# Patient Record
Sex: FEMALE | Race: BLACK OR AFRICAN AMERICAN | NOT HISPANIC OR LATINO | URBAN - METROPOLITAN AREA
[De-identification: names, ages, dates, MRNs, and addresses within clinical notes are randomized per-mention and may not be internally consistent; named-entity substitution may affect disease eponyms.]

---

## 2017-03-08 ENCOUNTER — EMERGENCY (EMERGENCY)
Facility: HOSPITAL | Age: 82
LOS: 0 days | Discharge: HOME | End: 2017-03-08

## 2017-03-09 ENCOUNTER — APPOINTMENT (OUTPATIENT)
Dept: OTOLARYNGOLOGY | Facility: CLINIC | Age: 82
End: 2017-03-09

## 2017-03-14 ENCOUNTER — APPOINTMENT (OUTPATIENT)
Dept: BREAST CENTER | Facility: CLINIC | Age: 82
End: 2017-03-14

## 2017-03-20 ENCOUNTER — APPOINTMENT (OUTPATIENT)
Dept: VASCULAR SURGERY | Facility: CLINIC | Age: 82
End: 2017-03-20

## 2017-03-23 ENCOUNTER — APPOINTMENT (OUTPATIENT)
Dept: VASCULAR SURGERY | Facility: CLINIC | Age: 82
End: 2017-03-23

## 2017-05-02 ENCOUNTER — OUTPATIENT (OUTPATIENT)
Dept: OUTPATIENT SERVICES | Facility: HOSPITAL | Age: 82
LOS: 1 days | Discharge: HOME | End: 2017-05-02

## 2017-05-15 ENCOUNTER — APPOINTMENT (OUTPATIENT)
Dept: VASCULAR SURGERY | Facility: CLINIC | Age: 82
End: 2017-05-15

## 2017-05-15 VITALS
SYSTOLIC BLOOD PRESSURE: 144 MMHG | DIASTOLIC BLOOD PRESSURE: 80 MMHG | WEIGHT: 159 LBS | BODY MASS INDEX: 28.17 KG/M2 | HEIGHT: 63 IN

## 2017-05-15 DIAGNOSIS — Z78.9 OTHER SPECIFIED HEALTH STATUS: ICD-10-CM

## 2017-05-15 DIAGNOSIS — Z86.79 PERSONAL HISTORY OF OTHER DISEASES OF THE CIRCULATORY SYSTEM: ICD-10-CM

## 2017-05-15 DIAGNOSIS — Z86.2 PERSONAL HISTORY OF DISEASES OF THE BLOOD AND BLOOD-FORMING ORGANS AND CERTAIN DISORDERS INVOLVING THE IMMUNE MECHANISM: ICD-10-CM

## 2017-05-15 DIAGNOSIS — Z87.19 PERSONAL HISTORY OF OTHER DISEASES OF THE DIGESTIVE SYSTEM: ICD-10-CM

## 2017-05-15 DIAGNOSIS — I25.10 ATHEROSCLEROTIC HEART DISEASE OF NATIVE CORONARY ARTERY W/OUT ANGINA PECTORIS: ICD-10-CM

## 2017-05-15 RX ORDER — OMEPRAZOLE 20 MG/1
20 TABLET, DELAYED RELEASE ORAL
Refills: 0 | Status: ACTIVE | COMMUNITY

## 2017-05-15 RX ORDER — RANOLAZINE 500 MG/1
500 TABLET, FILM COATED, EXTENDED RELEASE ORAL
Refills: 0 | Status: ACTIVE | COMMUNITY

## 2017-05-15 RX ORDER — METOPROLOL TARTRATE 50 MG/1
50 TABLET, FILM COATED ORAL
Refills: 0 | Status: ACTIVE | COMMUNITY

## 2017-05-15 RX ORDER — ISOSORBIDE DINITRATE 30 MG/1
30 TABLET ORAL
Refills: 0 | Status: ACTIVE | COMMUNITY

## 2017-05-15 RX ORDER — ASPIRIN 81 MG
81 TABLET, DELAYED RELEASE (ENTERIC COATED) ORAL
Refills: 0 | Status: ACTIVE | COMMUNITY

## 2017-05-15 RX ORDER — ATORVASTATIN CALCIUM 10 MG/1
10 TABLET, FILM COATED ORAL
Refills: 0 | Status: ACTIVE | COMMUNITY

## 2017-05-15 RX ORDER — WARFARIN 5 MG/1
5 TABLET ORAL
Refills: 0 | Status: ACTIVE | COMMUNITY

## 2017-06-28 DIAGNOSIS — Z79.01 LONG TERM (CURRENT) USE OF ANTICOAGULANTS: ICD-10-CM

## 2017-06-28 DIAGNOSIS — I48.91 UNSPECIFIED ATRIAL FIBRILLATION: ICD-10-CM

## 2017-08-14 ENCOUNTER — APPOINTMENT (OUTPATIENT)
Dept: VASCULAR SURGERY | Facility: CLINIC | Age: 82
End: 2017-08-14
Payer: MEDICARE

## 2017-08-14 VITALS
WEIGHT: 162 LBS | BODY MASS INDEX: 28.7 KG/M2 | HEIGHT: 63 IN | SYSTOLIC BLOOD PRESSURE: 142 MMHG | DIASTOLIC BLOOD PRESSURE: 60 MMHG

## 2017-08-14 PROCEDURE — 99213 OFFICE O/P EST LOW 20 MIN: CPT

## 2017-08-14 PROCEDURE — 93970 EXTREMITY STUDY: CPT

## 2017-08-14 RX ORDER — WARFARIN 2.5 MG/1
2.5 TABLET ORAL
Qty: 180 | Refills: 0 | Status: ACTIVE | COMMUNITY
Start: 2017-03-15

## 2017-08-14 RX ORDER — CEPHALEXIN 500 MG/1
500 CAPSULE ORAL
Qty: 15 | Refills: 0 | Status: ACTIVE | COMMUNITY
Start: 2017-03-08

## 2017-08-14 RX ORDER — ISOSORBIDE MONONITRATE 30 MG/1
30 TABLET, EXTENDED RELEASE ORAL
Qty: 90 | Refills: 0 | Status: ACTIVE | COMMUNITY
Start: 2017-02-15

## 2017-08-14 RX ORDER — AMLODIPINE BESYLATE 2.5 MG/1
2.5 TABLET ORAL
Qty: 90 | Refills: 0 | Status: ACTIVE | COMMUNITY
Start: 2017-04-21

## 2017-08-14 RX ORDER — VALSARTAN 160 MG/1
160 TABLET, COATED ORAL
Qty: 90 | Refills: 0 | Status: ACTIVE | COMMUNITY
Start: 2017-02-20

## 2017-08-14 RX ORDER — OMEPRAZOLE 40 MG/1
40 CAPSULE, DELAYED RELEASE ORAL
Qty: 90 | Refills: 0 | Status: ACTIVE | COMMUNITY
Start: 2017-03-11

## 2017-08-14 RX ORDER — HYDROCHLOROTHIAZIDE 12.5 MG/1
12.5 CAPSULE ORAL
Qty: 90 | Refills: 0 | Status: ACTIVE | COMMUNITY
Start: 2017-06-12

## 2018-02-10 ENCOUNTER — EMERGENCY (EMERGENCY)
Facility: HOSPITAL | Age: 83
LOS: 0 days | Discharge: HOME | End: 2018-02-10
Attending: EMERGENCY MEDICINE | Admitting: INTERNAL MEDICINE

## 2018-02-10 VITALS
DIASTOLIC BLOOD PRESSURE: 69 MMHG | RESPIRATION RATE: 18 BRPM | OXYGEN SATURATION: 96 % | TEMPERATURE: 98 F | HEART RATE: 71 BPM | SYSTOLIC BLOOD PRESSURE: 143 MMHG

## 2018-02-10 DIAGNOSIS — Z79.899 OTHER LONG TERM (CURRENT) DRUG THERAPY: ICD-10-CM

## 2018-02-10 DIAGNOSIS — Z98.890 OTHER SPECIFIED POSTPROCEDURAL STATES: Chronic | ICD-10-CM
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
## 2018-02-10 DIAGNOSIS — M79.661 PAIN IN RIGHT LOWER LEG: ICD-10-CM
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
## 2018-02-10 DIAGNOSIS — R42 DIZZINESS AND GIDDINESS: ICD-10-CM

## 2018-02-10 DIAGNOSIS — I10 ESSENTIAL (PRIMARY) HYPERTENSION: ICD-10-CM

## 2018-02-10 DIAGNOSIS — Z79.01 LONG TERM (CURRENT) USE OF ANTICOAGULANTS: ICD-10-CM

## 2018-02-10 DIAGNOSIS — I97.610 POSTPROCEDURAL HEMORRHAGE OF A CIRCULATORY SYSTEM ORGAN OR STRUCTURE FOLLOWING A CARDIAC CATHETERIZATION: ICD-10-CM
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
## 2018-02-10 DIAGNOSIS — Z90.710 ACQUIRED ABSENCE OF BOTH CERVIX AND UTERUS: ICD-10-CM
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
## 2018-02-10 DIAGNOSIS — Z98.890 OTHER SPECIFIED POSTPROCEDURAL STATES: ICD-10-CM

## 2018-02-10 DIAGNOSIS — I25.10 ATHEROSCLEROTIC HEART DISEASE OF NATIVE CORONARY ARTERY WITHOUT ANGINA PECTORIS: ICD-10-CM

## 2018-02-10 LAB
ALBUMIN SERPL ELPH-MCNC: 3 G/DL — SIGNIFICANT CHANGE UP (ref 3–5.5)
ALP SERPL-CCNC: 46 U/L — SIGNIFICANT CHANGE UP (ref 30–115)
ALT FLD-CCNC: 13 U/L — SIGNIFICANT CHANGE UP (ref 0–41)
ANION GAP SERPL CALC-SCNC: 7 MMOL/L — SIGNIFICANT CHANGE UP (ref 7–14)
APTT BLD: 28.4 SEC — SIGNIFICANT CHANGE UP (ref 27–39.2)
AST SERPL-CCNC: 41 U/L — SIGNIFICANT CHANGE UP (ref 0–41)
BASOPHILS # BLD AUTO: 0.02 K/UL — SIGNIFICANT CHANGE UP (ref 0–0.2)
BASOPHILS NFR BLD AUTO: 0.3 % — SIGNIFICANT CHANGE UP (ref 0–1)
BILIRUB SERPL-MCNC: 1.4 MG/DL — HIGH (ref 0.2–1.2)
BUN SERPL-MCNC: 11 MG/DL — SIGNIFICANT CHANGE UP (ref 10–20)
CALCIUM SERPL-MCNC: 8.5 MG/DL — SIGNIFICANT CHANGE UP (ref 8.5–10.1)
CHLORIDE SERPL-SCNC: 101 MMOL/L — SIGNIFICANT CHANGE UP (ref 98–110)
CO2 SERPL-SCNC: 30 MMOL/L — SIGNIFICANT CHANGE UP (ref 17–32)
CREAT SERPL-MCNC: 0.9 MG/DL — SIGNIFICANT CHANGE UP (ref 0.7–1.5)
EOSINOPHIL # BLD AUTO: 0.09 K/UL — SIGNIFICANT CHANGE UP (ref 0–0.7)
EOSINOPHIL NFR BLD AUTO: 1.2 % — SIGNIFICANT CHANGE UP (ref 0–8)
GLUCOSE SERPL-MCNC: 86 MG/DL — SIGNIFICANT CHANGE UP (ref 70–110)
HCT VFR BLD CALC: 32.8 % — LOW (ref 37–47)
HGB BLD-MCNC: 10.7 G/DL — LOW (ref 14–18)
IMM GRANULOCYTES NFR BLD AUTO: 0.4 % — HIGH (ref 0.1–0.3)
INR BLD: 1.35 RATIO — HIGH (ref 0.65–1.3)
LYMPHOCYTES # BLD AUTO: 1.04 K/UL — LOW (ref 1.2–3.4)
LYMPHOCYTES # BLD AUTO: 14.1 % — LOW (ref 20.5–51.1)
MCHC RBC-ENTMCNC: 28.5 PG — SIGNIFICANT CHANGE UP (ref 27–31)
MCHC RBC-ENTMCNC: 32.6 G/DL — SIGNIFICANT CHANGE UP (ref 32–37)
MCV RBC AUTO: 87.5 FL — SIGNIFICANT CHANGE UP (ref 81–91)
MONOCYTES # BLD AUTO: 0.93 K/UL — HIGH (ref 0.1–0.6)
MONOCYTES NFR BLD AUTO: 12.6 % — HIGH (ref 1.7–9.3)
NEUTROPHILS # BLD AUTO: 5.29 K/UL — SIGNIFICANT CHANGE UP (ref 1.4–6.5)
NEUTROPHILS NFR BLD AUTO: 71.4 % — SIGNIFICANT CHANGE UP (ref 42.2–75.2)
NRBC # BLD: 0 /100 WBCS — SIGNIFICANT CHANGE UP (ref 0–0)
PLATELET # BLD AUTO: 278 K/UL — SIGNIFICANT CHANGE UP (ref 130–400)
POTASSIUM SERPL-MCNC: 3.9 MMOL/L — SIGNIFICANT CHANGE UP (ref 3.5–5)
POTASSIUM SERPL-SCNC: 3.9 MMOL/L — SIGNIFICANT CHANGE UP (ref 3.5–5)
PROT SERPL-MCNC: 5.6 G/DL — LOW (ref 6–8)
PROTHROM AB SERPL-ACNC: 14.7 SEC — HIGH (ref 9.95–12.87)
RBC # BLD: 3.75 M/UL — LOW (ref 4.2–5.4)
RBC # FLD: 15.8 % — HIGH (ref 11.5–14.5)
SODIUM SERPL-SCNC: 138 MMOL/L — SIGNIFICANT CHANGE UP (ref 135–146)
WBC # BLD: 7.4 K/UL — SIGNIFICANT CHANGE UP (ref 4.8–10.8)
WBC # FLD AUTO: 7.4 K/UL — SIGNIFICANT CHANGE UP (ref 4.8–10.8)

## 2018-02-10 NOTE — ED PROCEDURE NOTE - PROCEDURE ADDITIONAL DETAILS
initially attempted dermabond but wound still bleeding. surgical site from left groin was cleansed again, and 3 stitches placed with success.

## 2018-02-10 NOTE — ED PROVIDER NOTE - MEDICAL DECISION MAKING DETAILS
elderly female with post op bleeding from surgical wound site on left groin. sutured placed, labs and studies done. pt did well and dc home. baseline hg 8.9 and today over 10.

## 2018-02-10 NOTE — ED PROVIDER NOTE - CARE PLAN
Principal Discharge DX:	Bleeding  Secondary Diagnosis:	Cardiac catheterization as cause of abnormal reaction or later complication  Secondary Diagnosis:	Laceration of leg

## 2018-02-10 NOTE — ED PROVIDER NOTE - PROGRESS NOTE DETAILS
spoke to Esther vascular tech a few hours ago--venous duplex neg and arterial study shows no pseduoaneursym in groin

## 2018-02-10 NOTE — ED PROVIDER NOTE - OBJECTIVE STATEMENT
84 yo female with PMH CAD, HTN, TAVR 3 days ago at [facility] presents to ER for bleeding from surgical wound site in left groin. Pt states this was the side the catheter went thru for TAVR and did well with procedure right after. But bleeding started severely a few hours pta. Pt states she also has some pain to the right groin when she had a diagnostic cath on the right groin. No open wound to right groin but +mild discomfort. Also states some swelling to right knee that feels like her arthritis. Pt has no fever, no chills, no sob, +mild dizziness. No sob, no cp, no N/V/diarrhea. Pt denies any other trauma or complaints. Now on coumadin, plavix and aspirin after this TAVR procedure.     Psurg: left knee replacement  All: NKDA  SH: no ETOH, no tabacco,   PMD Madella, Cardio [place]

## 2018-02-10 NOTE — ED PROVIDER NOTE - PHYSICAL EXAMINATION
VITAL SIGNS: I have reviewed nursing notes and confirm.  CONSTITUTIONAL: Well-developed; well-nourished; in no acute distress.  SKIN: Skin exam is warm and dry, no acute rash. +laceration to the left groin 3 cm, linear from surgical wound site actively bleeding but not pulsatile (more oozing from site)  HEAD: Normocephalic; atraumatic.  EYES: PERRL, EOM intact; conjunctiva and sclera clear.  ENT: No nasal discharge; airway clear. TMs clear.  NECK: Supple; non tender.  CARD: S1, S2 normal; no murmurs, gallops, or rubs. Regular rate and rhythm.  RESP: No wheezes, rales or rhonchi.  ABD: Normal bowel sounds; soft; non-distended; non-tender; no hepatosplenomegaly.  EXT: Normal ROM except mild limitation to right knee secondary to some pain and swelling. No clubbing, cyanosis or edema. Mild right calf pain   LYMPH: No acute cervical adenopathy.  NEURO: Alert, oriented. Grossly unremarkable. No focal deficits.  PSYCH: Cooperative, appropriate.

## 2018-02-11 VITALS
SYSTOLIC BLOOD PRESSURE: 148 MMHG | DIASTOLIC BLOOD PRESSURE: 71 MMHG | HEART RATE: 88 BPM | OXYGEN SATURATION: 97 % | TEMPERATURE: 97 F | RESPIRATION RATE: 18 BRPM

## 2018-02-12 ENCOUNTER — APPOINTMENT (OUTPATIENT)
Dept: VASCULAR SURGERY | Facility: CLINIC | Age: 83
End: 2018-02-12

## 2018-03-05 ENCOUNTER — EMERGENCY (EMERGENCY)
Facility: HOSPITAL | Age: 83
LOS: 0 days | Discharge: HOME | End: 2018-03-05
Attending: EMERGENCY MEDICINE | Admitting: INTERNAL MEDICINE

## 2018-03-05 VITALS
SYSTOLIC BLOOD PRESSURE: 166 MMHG | OXYGEN SATURATION: 99 % | RESPIRATION RATE: 18 BRPM | DIASTOLIC BLOOD PRESSURE: 74 MMHG | HEART RATE: 84 BPM | TEMPERATURE: 97 F

## 2018-03-05 DIAGNOSIS — I25.10 ATHEROSCLEROTIC HEART DISEASE OF NATIVE CORONARY ARTERY WITHOUT ANGINA PECTORIS: ICD-10-CM

## 2018-03-05 DIAGNOSIS — K64.9 UNSPECIFIED HEMORRHOIDS: ICD-10-CM

## 2018-03-05 DIAGNOSIS — Z79.899 OTHER LONG TERM (CURRENT) DRUG THERAPY: ICD-10-CM

## 2018-03-05 DIAGNOSIS — Z98.890 OTHER SPECIFIED POSTPROCEDURAL STATES: Chronic | ICD-10-CM

## 2018-03-05 DIAGNOSIS — Z90.710 ACQUIRED ABSENCE OF BOTH CERVIX AND UTERUS: ICD-10-CM

## 2018-03-05 DIAGNOSIS — Z95.5 PRESENCE OF CORONARY ANGIOPLASTY IMPLANT AND GRAFT: ICD-10-CM

## 2018-03-05 DIAGNOSIS — Z95.2 PRESENCE OF PROSTHETIC HEART VALVE: ICD-10-CM

## 2018-03-05 DIAGNOSIS — I10 ESSENTIAL (PRIMARY) HYPERTENSION: ICD-10-CM

## 2018-03-05 DIAGNOSIS — Z79.01 LONG TERM (CURRENT) USE OF ANTICOAGULANTS: ICD-10-CM

## 2018-03-05 DIAGNOSIS — R79.9 ABNORMAL FINDING OF BLOOD CHEMISTRY, UNSPECIFIED: ICD-10-CM

## 2018-03-05 DIAGNOSIS — D64.9 ANEMIA, UNSPECIFIED: ICD-10-CM

## 2018-03-05 DIAGNOSIS — Z79.02 LONG TERM (CURRENT) USE OF ANTITHROMBOTICS/ANTIPLATELETS: ICD-10-CM

## 2018-03-05 DIAGNOSIS — Z79.82 LONG TERM (CURRENT) USE OF ASPIRIN: ICD-10-CM

## 2018-03-05 LAB
ANION GAP SERPL CALC-SCNC: 7 MMOL/L — SIGNIFICANT CHANGE UP (ref 7–14)
APTT BLD: 36.6 SEC — SIGNIFICANT CHANGE UP (ref 27–39.2)
BASOPHILS # BLD AUTO: 0.02 K/UL — SIGNIFICANT CHANGE UP (ref 0–0.2)
BASOPHILS NFR BLD AUTO: 0.4 % — SIGNIFICANT CHANGE UP (ref 0–1)
BLD GP AB SCN SERPL QL: SIGNIFICANT CHANGE UP
BUN SERPL-MCNC: 11 MG/DL — SIGNIFICANT CHANGE UP (ref 10–20)
CALCIUM SERPL-MCNC: 8.6 MG/DL — SIGNIFICANT CHANGE UP (ref 8.5–10.1)
CHLORIDE SERPL-SCNC: 105 MMOL/L — SIGNIFICANT CHANGE UP (ref 98–110)
CO2 SERPL-SCNC: 28 MMOL/L — SIGNIFICANT CHANGE UP (ref 17–32)
CREAT SERPL-MCNC: 0.9 MG/DL — SIGNIFICANT CHANGE UP (ref 0.7–1.5)
EOSINOPHIL # BLD AUTO: 0.1 K/UL — SIGNIFICANT CHANGE UP (ref 0–0.7)
EOSINOPHIL NFR BLD AUTO: 2.2 % — SIGNIFICANT CHANGE UP (ref 0–8)
GLUCOSE SERPL-MCNC: 99 MG/DL — SIGNIFICANT CHANGE UP (ref 70–110)
HCT VFR BLD CALC: 25.9 % — LOW (ref 37–47)
HGB BLD-MCNC: 8.3 G/DL — LOW (ref 12–16)
IMM GRANULOCYTES NFR BLD AUTO: 0.2 % — SIGNIFICANT CHANGE UP (ref 0.1–0.3)
INR BLD: 2.71 RATIO — HIGH (ref 0.65–1.3)
LYMPHOCYTES # BLD AUTO: 1.22 K/UL — SIGNIFICANT CHANGE UP (ref 1.2–3.4)
LYMPHOCYTES # BLD AUTO: 26.5 % — SIGNIFICANT CHANGE UP (ref 20.5–51.1)
MCHC RBC-ENTMCNC: 29.5 PG — SIGNIFICANT CHANGE UP (ref 27–31)
MCHC RBC-ENTMCNC: 32 G/DL — SIGNIFICANT CHANGE UP (ref 32–37)
MCV RBC AUTO: 92.2 FL — SIGNIFICANT CHANGE UP (ref 81–99)
MONOCYTES # BLD AUTO: 0.47 K/UL — SIGNIFICANT CHANGE UP (ref 0.1–0.6)
MONOCYTES NFR BLD AUTO: 10.2 % — HIGH (ref 1.7–9.3)
NEUTROPHILS # BLD AUTO: 2.79 K/UL — SIGNIFICANT CHANGE UP (ref 1.4–6.5)
NEUTROPHILS NFR BLD AUTO: 60.5 % — SIGNIFICANT CHANGE UP (ref 42.2–75.2)
NRBC # BLD: 0 /100 WBCS — SIGNIFICANT CHANGE UP (ref 0–0)
PLATELET # BLD AUTO: 308 K/UL — SIGNIFICANT CHANGE UP (ref 130–400)
POTASSIUM SERPL-MCNC: 3.6 MMOL/L — SIGNIFICANT CHANGE UP (ref 3.5–5)
POTASSIUM SERPL-SCNC: 3.6 MMOL/L — SIGNIFICANT CHANGE UP (ref 3.5–5)
PROTHROM AB SERPL-ACNC: 29.9 SEC — HIGH (ref 9.95–12.87)
RBC # BLD: 2.81 M/UL — LOW (ref 4.2–5.4)
RBC # FLD: 18.8 % — HIGH (ref 11.5–14.5)
SODIUM SERPL-SCNC: 140 MMOL/L — SIGNIFICANT CHANGE UP (ref 135–146)
TYPE + AB SCN PNL BLD: SIGNIFICANT CHANGE UP
WBC # BLD: 4.61 K/UL — LOW (ref 4.8–10.8)
WBC # FLD AUTO: 4.61 K/UL — LOW (ref 4.8–10.8)

## 2018-03-05 NOTE — ED PROVIDER NOTE - ATTENDING CONTRIBUTION TO CARE
83F PMH CAD stents, AS s/p TAVR 1 mo ago, dvt/pe coumadin, anemia on iron (Hgb 8- 10) sent by PMD for anemia. Routine blood work showed Hgb 7. +fatigue. no cp, sob, palp, dizziness, loc. no BRBPR or melena. No vaginal bleeding. on exam, AFVSS, well shaina nad, ncat, eomi, perrla, mmm, lctab, rrr nl s1s2 no mrg, abd soft ntnd, aaox3, no focal deficits, no le edema or calf ttp, a/p; Anemia, will check cbc, basic labs, t+s, rectal exam, re-eval. 83F PMH CAD stents, AS s/p TAVR 1 mo ago, dvt/pe coumadin, anemia on iron (Hgb 8- 10) sent by PMD for anemia. Routine blood work by pmh showed Hgb 7. +fatigue. no cp, sob, palp, dizziness, loc. no BRBPR or melena. No vaginal bleeding. pt states hgb was 7 prior to tavr and was given 2u 1 week prior to procedure and then 2 u day of procedure. on exam, AFVSS, well shaina nad, ncat, eomi, perrla, mmm, lctab, rrr nl s1s2 no mrg, abd soft ntnd, aaox3, no focal deficits, no le edema or calf ttp, Rectal exam with resident dr torres- brown stool, nontender nonbleeding external hemorhoid, guiac neg stool,  a/p; Anemia, will check cbc, basic labs, t+s, re-eval. rectal exam no blood, guiaic neg.

## 2018-03-05 NOTE — ED PROVIDER NOTE - OBJECTIVE STATEMENT
82 yo F with cad s/p 6 stents [last in 2005] on asa, dvt/pe on coumadin, TAVR [2/2018] started on plavix after procedure, ? lupus [off treatment since 1972] sent in by pmd for low hemoglobin . She went for routine visit friday and today she was told to come to ed for a hg of 7 and to get a transfusion. She endorse fatigue which she always has but denies any dizziness, chest pain. palpitation, hemetemisis, vaginal bleeding, melana or hematochezia. No weight changes. no dyspnea no hematuria.    colono 2013 - diverticulosis, hemorrhoids, single polyp removed    egd 2013 - erosive gastritis 82 yo F with cad s/p 6 stents [last in 2005] on asa, dvt/pe on coumadin, TAVR [2/2018] started on plavix after procedure, ? lupus [off treatment since 1972] sent in by pmd for low hemoglobin . Patient has known history of anemia, prior to her TAVR last month hb was 7 and she receieved prbc preoperatievly. She went for routine visit friday and today she was told to come to ed for a hg of 7 and to get a transfusion. She endorse fatigue which she always has but denies any dizziness, chest pain. palpitation, hemetemisis, vaginal bleeding, melana or hematochezia. No weight changes. no dyspnea no hematuria.    colono 2013 - diverticulosis, hemorrhoids, single polyp removed    egd 2013 - erosive gastritis

## 2018-03-05 NOTE — ED PROVIDER NOTE - PHYSICAL EXAMINATION
GENERAL: NAD, well-groomed, well-developed  EYES: EOMI, PERRLA, conjunctiva and sclera clear  ENMT: No tonsillar erythema, exudates, or enlargement; Moist mucous membranes, Good dentition  NECK: Supple, No JVD  NERVOUS SYSTEM:  Alert & Oriented X3, Motor Strength 5/5 B/L upper and lower extremities  CHEST/LUNG: Clear to percussion bilaterally; No rales, rhonchi, wheezing, or rubs  HEART: Regular rate and rhythm; No murmurs, rubs, or gallops  ABDOMEN: Soft, Nontender, Nondistended; Bowel sounds present  EXTREMITIES:  2+ Peripheral Pulses, No clubbing, cyanosis, or edema  LYMPH: No lymphadenopathy noted  SKIN: No rashes or lesions GENERAL: NAD, well-groomed, well-developed  EYES: EOMI, PERRLA, conjunctiva and sclera clear  ENMT: No tonsillar erythema, exudates, or enlargement; Moist mucous membranes, Good dentition  NECK: Supple, No JVD  NERVOUS SYSTEM:  Alert & Oriented X3, Motor Strength 5/5 B/L upper and lower extremities  CHEST/LUNG: Clear to percussion bilaterally; No rales, rhonchi, wheezing, or rubs  HEART: Regular rate and rhythm; No murmurs, rubs, or gallops  ABDOMEN: Soft, Nontender, Nondistended; Bowel sounds present  SCARLET: + hemorrhoids, no bleeding, negative guaicaic  EXTREMITIES:  2+ Peripheral Pulses, No clubbing, cyanosis, or edema  LYMPH: No lymphadenopathy noted  SKIN: No rashes or lesions

## 2018-03-05 NOTE — ED PROVIDER NOTE - MEDICAL DECISION MAKING DETAILS
Hgb 8.3, asymptomatic, no indication to transfuse, pt given copy of results to f/u w pmd. strict return precautions provided

## 2018-06-12 DIAGNOSIS — Z79.82 LONG TERM (CURRENT) USE OF ASPIRIN: ICD-10-CM

## 2018-06-12 DIAGNOSIS — Z79.01 LONG TERM (CURRENT) USE OF ANTICOAGULANTS: ICD-10-CM

## 2018-06-12 DIAGNOSIS — Z86.711 PERSONAL HISTORY OF PULMONARY EMBOLISM: ICD-10-CM

## 2018-06-12 DIAGNOSIS — I25.10 ATHEROSCLEROTIC HEART DISEASE OF NATIVE CORONARY ARTERY WITHOUT ANGINA PECTORIS: ICD-10-CM

## 2018-06-12 DIAGNOSIS — R04.0 EPISTAXIS: ICD-10-CM

## 2018-06-12 DIAGNOSIS — Z88.2 ALLERGY STATUS TO SULFONAMIDES: ICD-10-CM

## 2018-06-12 DIAGNOSIS — I11.0 HYPERTENSIVE HEART DISEASE WITH HEART FAILURE: ICD-10-CM

## 2018-06-12 DIAGNOSIS — Z86.718 PERSONAL HISTORY OF OTHER VENOUS THROMBOSIS AND EMBOLISM: ICD-10-CM

## 2018-06-12 DIAGNOSIS — E78.00 PURE HYPERCHOLESTEROLEMIA, UNSPECIFIED: ICD-10-CM

## 2018-06-12 DIAGNOSIS — I50.9 HEART FAILURE, UNSPECIFIED: ICD-10-CM

## 2018-06-12 DIAGNOSIS — I48.91 UNSPECIFIED ATRIAL FIBRILLATION: ICD-10-CM

## 2018-06-12 DIAGNOSIS — R23.3 SPONTANEOUS ECCHYMOSES: ICD-10-CM

## 2018-07-12 ENCOUNTER — APPOINTMENT (OUTPATIENT)
Dept: VASCULAR SURGERY | Facility: CLINIC | Age: 83
End: 2018-07-12
Payer: MEDICARE

## 2018-07-12 VITALS
DIASTOLIC BLOOD PRESSURE: 82 MMHG | HEIGHT: 63 IN | SYSTOLIC BLOOD PRESSURE: 150 MMHG | WEIGHT: 165 LBS | BODY MASS INDEX: 29.23 KG/M2

## 2018-07-12 PROCEDURE — 99213 OFFICE O/P EST LOW 20 MIN: CPT

## 2018-07-12 PROCEDURE — 93970 EXTREMITY STUDY: CPT

## 2018-07-17 PROBLEM — L93.0 DISCOID LUPUS ERYTHEMATOSUS: Chronic | Status: INACTIVE | Noted: 2018-02-10 | Resolved: 2018-03-05

## 2018-07-22 ENCOUNTER — INPATIENT (INPATIENT)
Facility: HOSPITAL | Age: 83
LOS: 4 days | Discharge: HOME HEALTH | End: 2018-07-27
Attending: INTERNAL MEDICINE | Admitting: INTERNAL MEDICINE
Payer: MEDICARE

## 2018-07-22 VITALS
OXYGEN SATURATION: 100 % | SYSTOLIC BLOOD PRESSURE: 179 MMHG | RESPIRATION RATE: 18 BRPM | TEMPERATURE: 96 F | HEART RATE: 60 BPM | DIASTOLIC BLOOD PRESSURE: 92 MMHG

## 2018-07-22 DIAGNOSIS — Z86.718 PERSONAL HISTORY OF OTHER VENOUS THROMBOSIS AND EMBOLISM: ICD-10-CM

## 2018-07-22 DIAGNOSIS — Z79.01 LONG TERM (CURRENT) USE OF ANTICOAGULANTS: ICD-10-CM

## 2018-07-22 DIAGNOSIS — R06.02 SHORTNESS OF BREATH: ICD-10-CM

## 2018-07-22 DIAGNOSIS — N17.9 ACUTE KIDNEY FAILURE, UNSPECIFIED: ICD-10-CM

## 2018-07-22 DIAGNOSIS — Z95.2 PRESENCE OF PROSTHETIC HEART VALVE: Chronic | ICD-10-CM

## 2018-07-22 DIAGNOSIS — R00.1 BRADYCARDIA, UNSPECIFIED: ICD-10-CM

## 2018-07-22 DIAGNOSIS — M19.90 UNSPECIFIED OSTEOARTHRITIS, UNSPECIFIED SITE: ICD-10-CM

## 2018-07-22 DIAGNOSIS — Z98.890 OTHER SPECIFIED POSTPROCEDURAL STATES: Chronic | ICD-10-CM

## 2018-07-22 DIAGNOSIS — I48.0 PAROXYSMAL ATRIAL FIBRILLATION: ICD-10-CM

## 2018-07-22 DIAGNOSIS — I25.10 ATHEROSCLEROTIC HEART DISEASE OF NATIVE CORONARY ARTERY WITHOUT ANGINA PECTORIS: ICD-10-CM

## 2018-07-22 DIAGNOSIS — I10 ESSENTIAL (PRIMARY) HYPERTENSION: ICD-10-CM

## 2018-07-22 DIAGNOSIS — R42 DIZZINESS AND GIDDINESS: ICD-10-CM

## 2018-07-22 DIAGNOSIS — Z95.3 PRESENCE OF XENOGENIC HEART VALVE: ICD-10-CM

## 2018-07-22 DIAGNOSIS — D64.9 ANEMIA, UNSPECIFIED: ICD-10-CM

## 2018-07-22 DIAGNOSIS — Z95.5 PRESENCE OF CORONARY ANGIOPLASTY IMPLANT AND GRAFT: ICD-10-CM

## 2018-07-22 DIAGNOSIS — E78.5 HYPERLIPIDEMIA, UNSPECIFIED: ICD-10-CM

## 2018-07-22 DIAGNOSIS — R00.0 TACHYCARDIA, UNSPECIFIED: ICD-10-CM

## 2018-07-22 LAB
ALBUMIN SERPL ELPH-MCNC: 4 G/DL — SIGNIFICANT CHANGE UP (ref 3.5–5.2)
ALP SERPL-CCNC: 72 U/L — SIGNIFICANT CHANGE UP (ref 30–115)
ALT FLD-CCNC: 18 U/L — SIGNIFICANT CHANGE UP (ref 0–41)
ANION GAP SERPL CALC-SCNC: 15 MMOL/L — HIGH (ref 7–14)
APPEARANCE UR: CLEAR — SIGNIFICANT CHANGE UP
APTT BLD: 33.8 SEC — SIGNIFICANT CHANGE UP (ref 27–39.2)
AST SERPL-CCNC: 27 U/L — SIGNIFICANT CHANGE UP (ref 0–41)
BACTERIA # UR AUTO: ABNORMAL /HPF
BASOPHILS # BLD AUTO: 0.02 K/UL — SIGNIFICANT CHANGE UP (ref 0–0.2)
BASOPHILS NFR BLD AUTO: 0.5 % — SIGNIFICANT CHANGE UP (ref 0–1)
BILIRUB SERPL-MCNC: 0.5 MG/DL — SIGNIFICANT CHANGE UP (ref 0.2–1.2)
BILIRUB UR-MCNC: NEGATIVE — SIGNIFICANT CHANGE UP
BLD GP AB SCN SERPL QL: SIGNIFICANT CHANGE UP
BUN SERPL-MCNC: 32 MG/DL — HIGH (ref 10–20)
CALCIUM SERPL-MCNC: 9.6 MG/DL — SIGNIFICANT CHANGE UP (ref 8.5–10.1)
CHLORIDE SERPL-SCNC: 99 MMOL/L — SIGNIFICANT CHANGE UP (ref 98–110)
CK MB CFR SERPL CALC: 2.2 NG/ML — SIGNIFICANT CHANGE UP (ref 0.6–6.3)
CK SERPL-CCNC: 90 U/L — SIGNIFICANT CHANGE UP (ref 0–225)
CO2 SERPL-SCNC: 28 MMOL/L — SIGNIFICANT CHANGE UP (ref 17–32)
COLOR SPEC: YELLOW — SIGNIFICANT CHANGE UP
CREAT SERPL-MCNC: 1.4 MG/DL — SIGNIFICANT CHANGE UP (ref 0.7–1.5)
DIFF PNL FLD: ABNORMAL
EOSINOPHIL # BLD AUTO: 0.05 K/UL — SIGNIFICANT CHANGE UP (ref 0–0.7)
EOSINOPHIL NFR BLD AUTO: 1.1 % — SIGNIFICANT CHANGE UP (ref 0–8)
EPI CELLS # UR: ABNORMAL /HPF
GLUCOSE SERPL-MCNC: 98 MG/DL — SIGNIFICANT CHANGE UP (ref 70–99)
GLUCOSE UR QL: NEGATIVE — SIGNIFICANT CHANGE UP
HCT VFR BLD CALC: 33.1 % — LOW (ref 37–47)
HGB BLD-MCNC: 11 G/DL — LOW (ref 12–16)
IMM GRANULOCYTES NFR BLD AUTO: 0.2 % — SIGNIFICANT CHANGE UP (ref 0.1–0.3)
INR BLD: 1.57 RATIO — HIGH (ref 0.65–1.3)
KETONES UR-MCNC: NEGATIVE — SIGNIFICANT CHANGE UP
LEUKOCYTE ESTERASE UR-ACNC: ABNORMAL
LYMPHOCYTES # BLD AUTO: 1.49 K/UL — SIGNIFICANT CHANGE UP (ref 1.2–3.4)
LYMPHOCYTES # BLD AUTO: 34.2 % — SIGNIFICANT CHANGE UP (ref 20.5–51.1)
MCHC RBC-ENTMCNC: 29.1 PG — SIGNIFICANT CHANGE UP (ref 27–31)
MCHC RBC-ENTMCNC: 33.2 G/DL — SIGNIFICANT CHANGE UP (ref 32–37)
MCV RBC AUTO: 87.6 FL — SIGNIFICANT CHANGE UP (ref 81–99)
MONOCYTES # BLD AUTO: 0.39 K/UL — SIGNIFICANT CHANGE UP (ref 0.1–0.6)
MONOCYTES NFR BLD AUTO: 8.9 % — SIGNIFICANT CHANGE UP (ref 1.7–9.3)
NEUTROPHILS # BLD AUTO: 2.4 K/UL — SIGNIFICANT CHANGE UP (ref 1.4–6.5)
NEUTROPHILS NFR BLD AUTO: 55.1 % — SIGNIFICANT CHANGE UP (ref 42.2–75.2)
NITRITE UR-MCNC: NEGATIVE — SIGNIFICANT CHANGE UP
NRBC # BLD: 0 /100 WBCS — SIGNIFICANT CHANGE UP (ref 0–0)
NT-PROBNP SERPL-SCNC: 645 PG/ML — HIGH (ref 0–300)
PH UR: 6.5 — SIGNIFICANT CHANGE UP (ref 5–8)
PLATELET # BLD AUTO: 279 K/UL — SIGNIFICANT CHANGE UP (ref 130–400)
POTASSIUM SERPL-MCNC: 4 MMOL/L — SIGNIFICANT CHANGE UP (ref 3.5–5)
POTASSIUM SERPL-SCNC: 4 MMOL/L — SIGNIFICANT CHANGE UP (ref 3.5–5)
PROT SERPL-MCNC: 7.3 G/DL — SIGNIFICANT CHANGE UP (ref 6–8)
PROT UR-MCNC: NEGATIVE — SIGNIFICANT CHANGE UP
PROTHROM AB SERPL-ACNC: 16.9 SEC — HIGH (ref 9.95–12.87)
RBC # BLD: 3.78 M/UL — LOW (ref 4.2–5.4)
RBC # FLD: 15.6 % — HIGH (ref 11.5–14.5)
RBC CASTS # UR COMP ASSIST: SIGNIFICANT CHANGE UP /HPF
SODIUM SERPL-SCNC: 142 MMOL/L — SIGNIFICANT CHANGE UP (ref 135–146)
SP GR SPEC: <=1.005 — SIGNIFICANT CHANGE UP (ref 1.01–1.03)
TROPONIN T SERPL-MCNC: <0.01 NG/ML — SIGNIFICANT CHANGE UP
TYPE + AB SCN PNL BLD: SIGNIFICANT CHANGE UP
UROBILINOGEN FLD QL: 0.2 — SIGNIFICANT CHANGE UP (ref 0.2–0.2)
WBC # BLD: 4.36 K/UL — LOW (ref 4.8–10.8)
WBC # FLD AUTO: 4.36 K/UL — LOW (ref 4.8–10.8)
WBC UR QL: SIGNIFICANT CHANGE UP /HPF

## 2018-07-22 RX ORDER — SODIUM CHLORIDE 9 MG/ML
1000 INJECTION, SOLUTION INTRAVENOUS
Qty: 0 | Refills: 0 | Status: DISCONTINUED | OUTPATIENT
Start: 2018-07-22 | End: 2018-07-23

## 2018-07-22 RX ORDER — METOPROLOL TARTRATE 50 MG
1 TABLET ORAL
Qty: 0 | Refills: 0 | COMMUNITY

## 2018-07-22 RX ORDER — WARFARIN SODIUM 2.5 MG/1
6 TABLET ORAL ONCE
Qty: 0 | Refills: 0 | Status: COMPLETED | OUTPATIENT
Start: 2018-07-22 | End: 2018-07-22

## 2018-07-22 RX ORDER — ASPIRIN/CALCIUM CARB/MAGNESIUM 324 MG
1 TABLET ORAL
Qty: 0 | Refills: 0 | COMMUNITY

## 2018-07-22 RX ORDER — ATORVASTATIN CALCIUM 80 MG/1
10 TABLET, FILM COATED ORAL AT BEDTIME
Qty: 0 | Refills: 0 | Status: DISCONTINUED | OUTPATIENT
Start: 2018-07-22 | End: 2018-07-27

## 2018-07-22 RX ORDER — CLOPIDOGREL BISULFATE 75 MG/1
1 TABLET, FILM COATED ORAL
Qty: 0 | Refills: 0 | COMMUNITY

## 2018-07-22 RX ORDER — PANTOPRAZOLE SODIUM 20 MG/1
40 TABLET, DELAYED RELEASE ORAL
Qty: 0 | Refills: 0 | Status: DISCONTINUED | OUTPATIENT
Start: 2018-07-22 | End: 2018-07-22

## 2018-07-22 RX ORDER — CLOPIDOGREL BISULFATE 75 MG/1
75 TABLET, FILM COATED ORAL
Qty: 0 | Refills: 0 | Status: DISCONTINUED | OUTPATIENT
Start: 2018-07-22 | End: 2018-07-27

## 2018-07-22 RX ORDER — FERROUS SULFATE 325(65) MG
325 TABLET ORAL DAILY
Qty: 0 | Refills: 0 | Status: DISCONTINUED | OUTPATIENT
Start: 2018-07-22 | End: 2018-07-27

## 2018-07-22 RX ORDER — OMEPRAZOLE 10 MG/1
1 CAPSULE, DELAYED RELEASE ORAL
Qty: 0 | Refills: 0 | COMMUNITY

## 2018-07-22 RX ORDER — HYDROCHLOROTHIAZIDE 25 MG
12.5 TABLET ORAL DAILY
Qty: 0 | Refills: 0 | Status: DISCONTINUED | OUTPATIENT
Start: 2018-07-22 | End: 2018-07-24

## 2018-07-22 RX ORDER — OMEPRAZOLE 10 MG/1
0 CAPSULE, DELAYED RELEASE ORAL
Qty: 0 | Refills: 0 | COMMUNITY

## 2018-07-22 RX ORDER — WARFARIN SODIUM 2.5 MG/1
1 TABLET ORAL
Qty: 0 | Refills: 0 | COMMUNITY

## 2018-07-22 RX ORDER — AMLODIPINE BESYLATE 2.5 MG/1
5 TABLET ORAL DAILY
Qty: 0 | Refills: 0 | Status: DISCONTINUED | OUTPATIENT
Start: 2018-07-22 | End: 2018-07-27

## 2018-07-22 RX ORDER — PANTOPRAZOLE SODIUM 20 MG/1
40 TABLET, DELAYED RELEASE ORAL
Qty: 0 | Refills: 0 | Status: DISCONTINUED | OUTPATIENT
Start: 2018-07-22 | End: 2018-07-27

## 2018-07-22 RX ADMIN — Medication 325 MILLIGRAM(S): at 22:00

## 2018-07-22 RX ADMIN — Medication 12.5 MILLIGRAM(S): at 22:01

## 2018-07-22 RX ADMIN — ATORVASTATIN CALCIUM 10 MILLIGRAM(S): 80 TABLET, FILM COATED ORAL at 22:00

## 2018-07-22 RX ADMIN — CLOPIDOGREL BISULFATE 75 MILLIGRAM(S): 75 TABLET, FILM COATED ORAL at 22:01

## 2018-07-22 RX ADMIN — AMLODIPINE BESYLATE 5 MILLIGRAM(S): 2.5 TABLET ORAL at 22:00

## 2018-07-22 RX ADMIN — PANTOPRAZOLE SODIUM 40 MILLIGRAM(S): 20 TABLET, DELAYED RELEASE ORAL at 22:01

## 2018-07-22 RX ADMIN — WARFARIN SODIUM 6 MILLIGRAM(S): 2.5 TABLET ORAL at 22:00

## 2018-07-22 NOTE — ED PROVIDER NOTE - PROGRESS NOTE DETAILS
pt reevaluated, results discussed, plan for admission. paged dr strange case discussed with dr. yeni huffman. case discussed with dr strange, will admit to her service.

## 2018-07-22 NOTE — H&P ADULT - NSHPOUTPATIENTPROVIDERS_GEN_ALL_CORE
PMD; DR Gonzalez, [facility]  Cardio; dr Colon at [facility]

## 2018-07-22 NOTE — ED PROVIDER NOTE - OBJECTIVE STATEMENT
82yo f pmhx heart valve replacement 2018, cardiac stents the last of which was in 2009, htn, on coumadin presetns CC SOB, SOBOE, nausea x 1 week. pt reports generalized fatigue, intermittent CP unable to describe in more detail. pt reports no change in her chronic le edema. pt admits to taking diuretics, reports compliance. no fevers, vomiting, cough, dysuria, abdominal pain, change in le swelling. nkda. 82yo f pmhx heart valve replacement 2018, cardiac stents the last of which was in 2009, htn, on coumadin presents CC SOB, SOBOE, nausea x 1 week. pt reports generalized fatigue, intermittent CP unable to describe in more detail. pt reports no change in her chronic le edema. pt admits to taking diuretics, reports compliance. no fevers, vomiting, cough, dysuria, abdominal pain, change in le swelling. nkda.

## 2018-07-22 NOTE — H&P ADULT - HISTORY OF PRESENT ILLNESS
83 yr F with hx of CAD s/p stents 6 (last 2005),HTN, DLD , DVT /PE on coumadin, TAVR (2/1018 in [place]) ,lupus (not on traetment), anemia ,OA ,has presented to ER with 3 weeks of dizziness exertional dyspnea and chest discomfort.    As per pt she was in her usual state of health when she started having dizziness ,while getting out of bed ,but can occur at rest ,also c/o exertional dyspnea and pressure like chest discomfort with exertion. Pt is not a good historian and was not able to explain clearly. No orthopnea, led edema ,cough or PND, no fever, chills or abd pain .  Also c/o nausea for last 3 days ,and decrease PO intake .she took couple of Aleve in last week too for headache too.    In ER ,  EKG showed sinus elena cardia with no ST segment elevation or T wave inversion  RESULTS:   · EKG Date/Time: 22-Jul-2018 15:48	  · Rate: 54	  · Interpretation: sinus elena, sinus arrythmia	  · [place]: 204	  · QRS: 120	  · ST/T Wave: no st elevations	  · Other Findings: lafb	    pt was elena cardiac, first set of cardiac enzymes negative and BNP of 645  also found to have elevated s.creatinine 83 yr F with hx of CAD s/p stents 6 (last 2005),HTN, DLD , DVT /PE on coumadin, TAVR (2/1018 in [place]) ,lupus (not on traetment), anemia ,OA ,has presented to ER with 3 weeks of dizziness exertional dyspnea and chest discomfort.    As per pt she was in her usual state of health when she started having dizziness ,while getting out of bed ,but can occur at rest ,also c/o exertional dyspnea and pressure like chest discomfort with exertion. Pt is not a good historian and was not able to explain clearly. No orthopnea, leg edema ,cough or PND, no fever, chills ,vomiting or abd pain .  Also c/o nausea for last 3 days ,and decrease PO intake .she took couple of Aleve in last week too for headache too.    In ER ,  EKG showed sinus elena cardia with no ST segment elevation or T wave inversion  RESULTS:   · EKG Date/Time: 22-Jul-2018 15:48	  · Rate: 54	  · Interpretation: sinus elena, sinus arrythmia	  · [place]: 204	  · QRS: 120	  · ST/T Wave: no st elevations	  · Other Findings: lafb	    pt was elena cardiac, first set of cardiac enzymes negative and BNP of 645  also found to have elevated s.creatinine

## 2018-07-22 NOTE — H&P ADULT - NSHPLABSRESULTS_GEN_ALL_CORE
11.0   4.36  )-----------( 279      ( 22 Jul 2018 16:10 )             33.1   07-22    142  |  99  |  32<H>  ----------------------------<  98  4.0   |  28  |  1.4    Ca    9.6      22 Jul 2018 16:10    TPro  7.3  /  Alb  4.0  /  TBili  0.5  /  DBili  x   /  AST  27  /  ALT  18  /  AlkPhos  72  07-22    LIVER FUNCTIONS - ( 22 Jul 2018 16:10 )  Alb: 4.0 g/dL / Pro: 7.3 g/dL / ALK PHOS: 72 U/L / ALT: 18 U/L / AST: 27 U/L / GGT: x         PT/INR - ( 22 Jul 2018 16:10 )   PT: 16.90 sec;   INR: 1.57 ratio         PTT - ( 22 Jul 2018 16:10 )  PTT:33.8 sec    CARDIAC MARKERS ( 22 Jul 2018 16:10 )  x     / <0.01 ng/mL / 90 U/L / x     / 2.2 ng/mL    Serum Pro-Brain Natriuretic Peptide: 645 pg/mL (07.22.18 @ 16:10)  Urinalysis Basic - ( 22 Jul 2018 17:00 )    Color: Yellow / Appearance: Clear / SG: <=1.005 / pH: x  Gluc: x / Ketone: Negative  / Bili: Negative / Urobili: 0.2   Blood: x / Protein: Negative / Nitrite: Negative   Leuk Esterase: Small / RBC: 1-2 /HPF / WBC 3-5 /HPF   Sq Epi: x / Non Sq Epi: Many /HPF / Bacteria: Few /HPF    EKG; sinus bradycardia    CXR; no cardiomegaly or infilterates

## 2018-07-22 NOTE — H&P ADULT - ASSESSMENT
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    #Exertional dyspnea/Chest discomfort in known CAD patient;  r/o ACS; Likely UA   first set of CE negative ,high BNP  follow second set CE  cardio eval ,  last cardiac cath in 7/14 shows non obstructive with patent stents in RCA and LAD   2D echo(last in record  from 2015 with nL EF ,G1DD and AS)      # Dizziness /nausea;  sinus elena on EKG  cw tele monitoring and hold metoprolol  consider EP eval ,if doesn't improve    #CAD/TVAR/HTN/DLD;  cw plavix, statin and amlodipine and HCTZ    #DVT/PE on coumadin;  will give 6 mg tonight  monitor INR and dose coumadin    #CONOR ;  last s.cr 0.92 in 8/17  hx of naproxen and decreases PO intake  likely pre renal  follow urine studies  hold valsartan and avoid nephrotoxic meds  clinically dry ;gentle hydration  Intake and output monitoring    #DVT and GI ppx;  on coumadin   cw protonics    #Diet;  DASH and renal    #activity as tolerated    FULL code    #disposal;   after cardio eval 83 yr F with hx of CAD s/p stents 6 (last 2005),HTN, DLD , 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    #Exertional dyspnea/Chest discomfort in known CAD patient;  r/o ACS; Likely UA   first set of CE negative ,high BNP  follow second set CE  cardio eval ,  last cardiac cath in 7/14 shows non obstructive with patent stents in RCA and LAD   2D echo(last in record  from 2015 with nL EF ,G1DD and AS)      # Dizziness /nausea;  sinus elena on EKG  cw tele monitoring and hold metoprolol  consider EP eval ,if doesn't improve    #CAD/TVAR/HTN/DLD;  cw plavix, statin and amlodipine and HCTZ  held valsartan and metoprolol    #DVT/PE on coumadin;  will give 6 mg tonight  monitor INR and dose coumadin    #CONOR ;  last s.cr 0.92 in 8/17  hx of naproxen and decreases PO intake  likely pre renal  follow urine studies  hold valsartan and avoid nephrotoxic meds  clinically dry ;gentle hydration  Intake and output monitoring    #DVT and GI ppx;  on coumadin   cw protonics    #Diet;  DASH and renal    #activity as tolerated    FULL code    #disposal;   after cardio eval

## 2018-07-22 NOTE — ED PROVIDER NOTE - PHYSICAL EXAMINATION
CONSTITUTIONAL: Well-developed; well-nourished; in no acute distress.   SKIN: warm, dry  HEAD: Normocephalic; atraumatic.  EYES: PERRL, EOMI, no conjunctival erythema  ENT: No nasal discharge; airway clear, mucous membranes moist  NECK: Supple; non tender.  CARD: +S1, S2 no murmurs, gallops, or rubs. Regular rate and rhythm. radial 2+   RESP: No wheezes, rales or rhonchi. CTABL  ABD: soft ntnd no rebound, guarding nor rigidity  EXT: moves all extremities, neg flex's b/l, No clubbing, cyanosis. +le edema  NEURO: Alert, oriented, grossly unremarkable, cn ii-xii grossly intact  PSYCH: Cooperative, appropriate.

## 2018-07-22 NOTE — ED PROVIDER NOTE - ATTENDING CONTRIBUTION TO CARE
84 y/o F pmh CAD s/p stent, heart valve replacement (?), on coumadin, htn, p/w intermittent chest discomfort, sob, dizziness x1wk.  somewhat worse today so came to ED.  Difficult for pt to characterize sx.  ROS PE above; Pt NAD; neck supple; CTAB; RRR + systolic murmur; abd s/nt no pedal edma; neuro non focal, walks unassissted.  IMP: infectious vs metabolic, consider acs; does not appear like cva, PE.  P: labs, ekg, cxr, ivf, reassess. 82 y/o F pmh CAD s/p stent, heart valve replacement (?), on coumadin, htn, p/w intermittent chest discomfort, sob, dizziness, nausea x1wk.  somewhat worse today so came to ED.  Difficult for pt to characterize sx.  no HA, v/d, med changes.  ROS PE above; Pt NAD; neck supple; CTAB; RRR + systolic murmur; abd s/nt no pedal edma; neuro non focal, walks unassissted.  IMP: infectious vs metabolic, consider acs; does not appear like cva, PE.  P: labs, ekg, cxr, ivf, reassess. See MDM

## 2018-07-22 NOTE — H&P ADULT - PMH
Coronary artery dilation    DVT (deep venous thrombosis)    Dyslipidemia    Hypertension    Lupus    OA (osteoarthritis)    Pulmonary embolism

## 2018-07-22 NOTE — ED PROVIDER NOTE - MEDICAL DECISION MAKING DETAILS
Chart finished. 84 y/o F pmh CAD s/p stent, heart valve replacement (?), on coumadin, htn, p/w intermittent chest discomfort, sob, dizziness, nausea x1wk.  somewhat worse today so came to ED.  Difficult for pt to characterize sx.  no HA, v/d, med changes.  ROS PE above; Pt NAD; neck supple; CTAB; RRR + systolic murmur; abd s/nt no pedal edma; neuro non focal, walks unassissted.  IMP: infectious vs metabolic, consider acs; does not appear like cva, PE.  P: labs, ekg, cxr, ivf, reassess.

## 2018-07-22 NOTE — H&P ADULT - NSHPPHYSICALEXAM_GEN_ALL_CORE
Vital Signs Last 24 Hrs  T(C): 36.7 (22 Jul 2018 19:54), Max: 36.7 (22 Jul 2018 19:54)  T(F): 98 (22 Jul 2018 19:54), Max: 98 (22 Jul 2018 19:54)  HR: 56 (22 Jul 2018 19:54) (56 - 60)  BP: 166/91 (22 Jul 2018 19:54) (151/82 - 179/92)  BP(mean): --  RR: 18 (22 Jul 2018 19:54) (18 - 18)  SpO2: 100% (22 Jul 2018 19:54) (100% - 100%)    PHYSICAL EXAM:      Constitutional: no acute distress    Eyes: no icterus or pallor    ENMT: within normal range    Neck: no JVD    Respiratory: VB +o ,no accessory muscle use    Cardiovascular:S1+S2+0    Gastrointestinal: soft ,non tender, BS +,no HS megaly    Extremities: no LE edema    Neurological: AAO *3   non focal

## 2018-07-22 NOTE — ED PROVIDER NOTE - NS ED ROS FT
General: No fevers, chills, +nausea, no vomiting  Eyes:  No eye discharge.  ENMT:  No hearing changes,   Cardiac:  +chest pain, SOB, edema.  Respiratory:  No cough or respiratory distress  GI:  No vomiting, or abdominal pain.  :  No dysuria, frequency or burning.  MS:  No muscle weakness  Neuro:  No LOC.  Skin:  No skin rash.   Endocrine: no hx diabetes.

## 2018-07-23 LAB
ALBUMIN SERPL ELPH-MCNC: 4 G/DL — SIGNIFICANT CHANGE UP (ref 3.5–5.2)
ALP SERPL-CCNC: 65 U/L — SIGNIFICANT CHANGE UP (ref 30–115)
ALT FLD-CCNC: 17 U/L — SIGNIFICANT CHANGE UP (ref 0–41)
ANION GAP SERPL CALC-SCNC: 10 MMOL/L — SIGNIFICANT CHANGE UP (ref 7–14)
APTT BLD: 33.4 SEC — SIGNIFICANT CHANGE UP (ref 27–39.2)
AST SERPL-CCNC: 26 U/L — SIGNIFICANT CHANGE UP (ref 0–41)
BASOPHILS # BLD AUTO: 0.03 K/UL — SIGNIFICANT CHANGE UP (ref 0–0.2)
BASOPHILS NFR BLD AUTO: 0.8 % — SIGNIFICANT CHANGE UP (ref 0–1)
BILIRUB SERPL-MCNC: 0.8 MG/DL — SIGNIFICANT CHANGE UP (ref 0.2–1.2)
BUN SERPL-MCNC: 22 MG/DL — HIGH (ref 10–20)
CALCIUM SERPL-MCNC: 9.7 MG/DL — SIGNIFICANT CHANGE UP (ref 8.5–10.1)
CHLORIDE SERPL-SCNC: 100 MMOL/L — SIGNIFICANT CHANGE UP (ref 98–110)
CHOLEST SERPL-MCNC: 135 MG/DL — SIGNIFICANT CHANGE UP (ref 100–200)
CK MB CFR SERPL CALC: 2.4 NG/ML — SIGNIFICANT CHANGE UP (ref 0.6–6.3)
CK MB CFR SERPL CALC: 2.9 NG/ML — SIGNIFICANT CHANGE UP (ref 0.6–6.3)
CK SERPL-CCNC: 85 U/L — SIGNIFICANT CHANGE UP (ref 0–225)
CK SERPL-CCNC: 88 U/L — SIGNIFICANT CHANGE UP (ref 0–225)
CO2 SERPL-SCNC: 31 MMOL/L — SIGNIFICANT CHANGE UP (ref 17–32)
CREAT SERPL-MCNC: 0.9 MG/DL — SIGNIFICANT CHANGE UP (ref 0.7–1.5)
CULTURE RESULTS: SIGNIFICANT CHANGE UP
EOSINOPHIL # BLD AUTO: 0.06 K/UL — SIGNIFICANT CHANGE UP (ref 0–0.7)
EOSINOPHIL NFR BLD AUTO: 1.7 % — SIGNIFICANT CHANGE UP (ref 0–8)
ESTIMATED AVERAGE GLUCOSE: 128 MG/DL — HIGH (ref 68–114)
GLUCOSE SERPL-MCNC: 90 MG/DL — SIGNIFICANT CHANGE UP (ref 70–99)
HBA1C BLD-MCNC: 6.1 % — HIGH (ref 4–5.6)
HCT VFR BLD CALC: 34.2 % — LOW (ref 37–47)
HDLC SERPL-MCNC: 53 MG/DL — SIGNIFICANT CHANGE UP (ref 40–125)
HGB BLD-MCNC: 11.3 G/DL — LOW (ref 12–16)
IMM GRANULOCYTES NFR BLD AUTO: 0.3 % — SIGNIFICANT CHANGE UP (ref 0.1–0.3)
INR BLD: 1.62 RATIO — HIGH (ref 0.65–1.3)
LIPID PNL WITH DIRECT LDL SERPL: 79 MG/DL — SIGNIFICANT CHANGE UP (ref 4–129)
LYMPHOCYTES # BLD AUTO: 1.27 K/UL — SIGNIFICANT CHANGE UP (ref 1.2–3.4)
LYMPHOCYTES # BLD AUTO: 35.1 % — SIGNIFICANT CHANGE UP (ref 20.5–51.1)
MAGNESIUM SERPL-MCNC: 2.2 MG/DL — SIGNIFICANT CHANGE UP (ref 1.8–2.4)
MCHC RBC-ENTMCNC: 28.7 PG — SIGNIFICANT CHANGE UP (ref 27–31)
MCHC RBC-ENTMCNC: 33 G/DL — SIGNIFICANT CHANGE UP (ref 32–37)
MCV RBC AUTO: 86.8 FL — SIGNIFICANT CHANGE UP (ref 81–99)
MONOCYTES # BLD AUTO: 0.37 K/UL — SIGNIFICANT CHANGE UP (ref 0.1–0.6)
MONOCYTES NFR BLD AUTO: 10.2 % — HIGH (ref 1.7–9.3)
NEUTROPHILS # BLD AUTO: 1.88 K/UL — SIGNIFICANT CHANGE UP (ref 1.4–6.5)
NEUTROPHILS NFR BLD AUTO: 51.9 % — SIGNIFICANT CHANGE UP (ref 42.2–75.2)
PLATELET # BLD AUTO: 190 K/UL — SIGNIFICANT CHANGE UP (ref 130–400)
POTASSIUM SERPL-MCNC: 4 MMOL/L — SIGNIFICANT CHANGE UP (ref 3.5–5)
POTASSIUM SERPL-SCNC: 4 MMOL/L — SIGNIFICANT CHANGE UP (ref 3.5–5)
PROT SERPL-MCNC: 7 G/DL — SIGNIFICANT CHANGE UP (ref 6–8)
PROTHROM AB SERPL-ACNC: 17.4 SEC — HIGH (ref 9.95–12.87)
RBC # BLD: 3.94 M/UL — LOW (ref 4.2–5.4)
RBC # FLD: 15.5 % — HIGH (ref 11.5–14.5)
SODIUM SERPL-SCNC: 141 MMOL/L — SIGNIFICANT CHANGE UP (ref 135–146)
SPECIMEN SOURCE: SIGNIFICANT CHANGE UP
TOTAL CHOLESTEROL/HDL RATIO MEASUREMENT: 2.5 RATIO — LOW (ref 4–5.5)
TRIGL SERPL-MCNC: 63 MG/DL — SIGNIFICANT CHANGE UP (ref 10–149)
TROPONIN T SERPL-MCNC: <0.01 NG/ML — SIGNIFICANT CHANGE UP
TROPONIN T SERPL-MCNC: <0.01 NG/ML — SIGNIFICANT CHANGE UP
WBC # BLD: 3.62 K/UL — LOW (ref 4.8–10.8)
WBC # FLD AUTO: 3.62 K/UL — LOW (ref 4.8–10.8)

## 2018-07-23 RX ORDER — LOSARTAN POTASSIUM 100 MG/1
25 TABLET, FILM COATED ORAL ONCE
Qty: 0 | Refills: 0 | Status: COMPLETED | OUTPATIENT
Start: 2018-07-23 | End: 2018-07-23

## 2018-07-23 RX ORDER — WARFARIN SODIUM 2.5 MG/1
6 TABLET ORAL ONCE
Qty: 0 | Refills: 0 | Status: COMPLETED | OUTPATIENT
Start: 2018-07-23 | End: 2018-07-23

## 2018-07-23 RX ORDER — ACETAMINOPHEN 500 MG
650 TABLET ORAL EVERY 6 HOURS
Qty: 0 | Refills: 0 | Status: DISCONTINUED | OUTPATIENT
Start: 2018-07-23 | End: 2018-07-27

## 2018-07-23 RX ORDER — LOSARTAN POTASSIUM 100 MG/1
50 TABLET, FILM COATED ORAL DAILY
Qty: 0 | Refills: 0 | Status: DISCONTINUED | OUTPATIENT
Start: 2018-07-24 | End: 2018-07-25

## 2018-07-23 RX ADMIN — AMLODIPINE BESYLATE 5 MILLIGRAM(S): 2.5 TABLET ORAL at 06:05

## 2018-07-23 RX ADMIN — Medication 650 MILLIGRAM(S): at 23:08

## 2018-07-23 RX ADMIN — SODIUM CHLORIDE 75 MILLILITER(S): 9 INJECTION, SOLUTION INTRAVENOUS at 00:01

## 2018-07-23 RX ADMIN — PANTOPRAZOLE SODIUM 40 MILLIGRAM(S): 20 TABLET, DELAYED RELEASE ORAL at 06:06

## 2018-07-23 RX ADMIN — LOSARTAN POTASSIUM 25 MILLIGRAM(S): 100 TABLET, FILM COATED ORAL at 23:08

## 2018-07-23 RX ADMIN — ATORVASTATIN CALCIUM 10 MILLIGRAM(S): 80 TABLET, FILM COATED ORAL at 21:40

## 2018-07-23 RX ADMIN — SODIUM CHLORIDE 75 MILLILITER(S): 9 INJECTION, SOLUTION INTRAVENOUS at 07:05

## 2018-07-23 RX ADMIN — Medication 12.5 MILLIGRAM(S): at 06:06

## 2018-07-23 RX ADMIN — WARFARIN SODIUM 6 MILLIGRAM(S): 2.5 TABLET ORAL at 21:40

## 2018-07-23 RX ADMIN — Medication 325 MILLIGRAM(S): at 11:02

## 2018-07-23 NOTE — CONSULT NOTE ADULT - ASSESSMENT
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    Exertional dyspnea/Chest discomfort in known CAD patient;  r/o MI  check serial EKGs,  CE      cath in 7/14 /18   shows non obstructive ds with patent stents in RCA and LAD   -2D echo(2015:nl EF ,G1DD and AS): LVEF 60 to 65%. Normal global left ventricular systolic function. Moderate concentric left ventricular hypertrophy. Moderate to severe mitral annular calcification. Thickening and calcification of the anterior and posterior mitral   valve leaflets. TAVR. No perivalvular regurgitation.    Dizziness /nausea;  -sinus elena on EKG  -c/w tele monitoring and hold metoprolol    CAD/TVAR/HTN/DLD;  -c/w plavix, statin and amlodipine   -d/c HCTZ  -hold valsartan and metoprolol    DVT/PE on coumadin  -c/w coumadin 6mg  -moniter INR      CONOR   -last s.cr 0.92 in 8/17  -hx of naproxen and decreases PO intake  -likely pre renal  -follow urine studies  -hold valsartan and avoid nephrotoxic meds  -c/w gentle hydration  - Intake and output monitoring    DVT ppx  -on coumadin    GI ppx;  -c/w  protonix    Diet;  DASH and renal    FULL CODE 83 yr F with hx of CAD s/p stents 6 (last 2005),HTN, DLD , 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    Exertional dyspnea/Chest discomfort in known CAD patient;  MI ruled out.      cath prior to TAVR: non obstructive ds with patent stents in RCA and LAD     Hypertension - uncontrolled.  Please resume ARB, may start Losartan 50 mg q AM from tomorrow, give 25 mg now in view of  mm.  D/C Lactate ringers that is going at 75 cc/Hr. Cr is 0.9    Dizziness /nausea;  -sinus elena on EKG  -c/w tele monitoring and hold metoprolol and resume if HR >70/min.    CAD/TVAR/HTN/DLD;  -c/w plavix, statin and amlodipine   -d/c HCTZ  -hold valsartan and metoprolol    DVT/PE on coumadin  -c/w coumadin 6mg  -moniter INR      CONOR   -last s.cr 0.92 in 8/17  increase PO intake.     DVT ppx  -on coumadin    GI ppx;  -c/w  protonix  d/w Intern

## 2018-07-23 NOTE — PROGRESS NOTE ADULT - SUBJECTIVE AND OBJECTIVE BOX
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    As per pt she was in her usual state of health when she started having dizziness ,while getting out of bed ,but can occur at rest ,also c/o exertional dyspnea and pressure like chest discomfort with exertion. Pt is not a good historian and was not able to explain clearly. No orthopnea, leg edema ,cough or PND, no fever, chills ,vomiting or abd pain .  Also c/o nausea for last 3 days ,and decrease PO intake .she took couple of Aleve in last week too for headache too.    In ER ,  EKG showed sinus elena cardia with no ST segment elevation or T wave inversion  RESULTS:   · EKG Date/Time: 22-Jul-2018 15:48	  · Rate: 54	  · Interpretation: sinus elena, sinus arrythmia	  · ID: [id number]	  · QRS: 120	  · ST/T Wave: no st elevations	  · Other Findings: lafb	    pt was elena cardiac, first set of cardiac enzymes negative and BNP of 645  also found to have elevated s.creatinine (22 Jul 2018 21:26)      REVIEW OF SYSTEMS      General:	    Skin/Breast:  	  Ophthalmologic:  	  ENMT:	    Respiratory and Thorax:  	  Cardiovascular:	    Gastrointestinal:	    Genitourinary:	    Musculoskeletal:	    Neurological:	    Psychiatric:	    Hematology/Lymphatics:	    Endocrine:	    Allergic/Immunologic:	    No Known Allergies      Home Medications:  amLODIPine 5 mg oral tablet: 1 tab(s) orally once a day (22 Jul 2018 21:29)  Coumadin 2.5 mg oral tablet: 1 tab(s) orally every 48 hours (22 Jul 2018 21:29)  Coumadin 5 mg oral tablet: 1 tab(s) orally every 48 hours (22 Jul 2018 21:29)  ferrous sulfate 325 mg (65 mg elemental iron) oral tablet: 1 tab(s) orally once a day (22 Jul 2018 21:29)  Lipitor 10 mg oral tablet: 1 tab(s) orally once a day (22 Jul 2018 21:29)  Metoprolol Tartrate 50 mg oral tablet: 1 tab(s) orally 2 times a day (22 Jul 2018 21:29)  omeprazole 20 mg oral delayed release capsule: 1 cap(s) orally once a day (22 Jul 2018 21:30)  Plavix 75 mg oral tablet: 1 tab(s) orally every 48 hours (22 Jul 2018 21:29)  valsartan-hydrochlorothiazide 160mg-12.5mg oral tablet: 1 tab(s) orally once a day (22 Jul 2018 21:29)      MEDICATIONS  (STANDING):  amLODIPine   Tablet 5 milliGRAM(s) Oral daily  atorvastatin 10 milliGRAM(s) Oral at bedtime  clopidogrel Tablet 75 milliGRAM(s) Oral <User Schedule>  ferrous    sulfate 325 milliGRAM(s) Oral daily  hydrochlorothiazide 12.5 milliGRAM(s) Oral daily  lactated ringers. 1000 milliLiter(s) (75 mL/Hr) IV Continuous <Continuous>  pantoprazole    Tablet 40 milliGRAM(s) Oral before breakfast    MEDICATIONS  (PRN):      PAST MEDICAL & SURGICAL HISTORY:  OA (osteoarthritis)  Dyslipidemia  Lupus  Pulmonary embolism  DVT (deep venous thrombosis)  Coronary artery dilation  Hypertension  S/P aortic valve replacement with prosthetic valve  H/O: hysterectomy      T(C): 35.9 (07-23-18 @ 06:03), Max: 36.7 (07-22-18 @ 19:54)  HR: 67 (07-23-18 @ 06:03) (56 - 67)  BP: 129/83 (07-23-18 @ 06:03) (129/83 - 179/92)  RR: 18 (07-23-18 @ 06:03) (18 - 18)  SpO2: 99% (07-23-18 @ 08:13) (99% - 100%)    PHYSICAL EXAM:      Constitutional:    Eyes:    ENMT:    Neck:    Breasts:    Back:    Respiratory:    Cardiovascular:    Gastrointestinal:    Genitourinary:    Rectal:    Extremities:    Vascular:    Neurological:    Skin:    Lymph Nodes:    Musculoskeletal:    Psychiatric:                              11.3   3.62  )-----------( 190      ( 23 Jul 2018 08:40 )             34.2       07-23    141  |  100  |  22<H>  ----------------------------<  90  4.0   |  31  |  0.9    Ca    9.7      23 Jul 2018 08:40  Mg     2.2     07-23    TPro  7.0  /  Alb  4.0  /  TBili  0.8  /  DBili  x   /  AST  26  /  ALT  17  /  AlkPhos  65  07-23      Urinalysis Basic - ( 22 Jul 2018 17:00 )    Color: Yellow / Appearance: Clear / SG: <=1.005 / pH: x  Gluc: x / Ketone: Negative  / Bili: Negative / Urobili: 0.2   Blood: x / Protein: Negative / Nitrite: Negative   Leuk Esterase: Small / RBC: 1-2 /HPF / WBC 3-5 /HPF   Sq Epi: x / Non Sq Epi: Many /HPF / Bacteria: Few /HPF        PT/INR - ( 23 Jul 2018 08:40 )   PT: 17.40 sec;   INR: 1.62 ratio         PTT - ( 23 Jul 2018 08:40 )  PTT:33.4 sec      07-22-18 @ 07:01  -  07-23-18 @ 07:00  --------------------------------------------------------  IN: 600 mL / OUT: 250 mL / NET: 350 mL            SHORTNESS OF BREATH  ^SHORTNESS OF BREATH  Family history of coronary artery disease (Father, Mother)  Handoff  MEWS Score  OA (osteoarthritis)  Dyslipidemia  Lupus  Pulmonary embolism  DVT (deep venous thrombosis)  Coronary artery dilation  Lupus  Hypertension  SOB (shortness of breath)  S/P aortic valve replacement with prosthetic valve  H/O aortic valve repair  H/O: hysterectomy  DIZZY  90+ 07-23-18 @ 10:00  GEMMA GOMEZ  83yFemale  Seen at F9. Comfortable, no ac c/o now. Hx reviewed with pt. & clarified.   Lives independently in her apt, w/ child lives upstairs. Maintains her ADL w/o any issues.   She used to attain gym till prior to her TAVR last Feb 2018.    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    As per pt she was having dizziness for last 3 wks, Got worse, while getting out of bed ,but can occur at rest ,also c/o exertional dyspnea and pressure like chest discomfort with exertion.  No orthopnea, leg edema ,cough or PND, no fever, chills ,vomiting or abd pain .  Also c/o nausea for last 3 days ,and decrease PO intake .she took couple of Aleve in last week too for headache too.    In ER ,  EKG showed sinus elena cardia with no ST segment elevation or T wave inversion  RESULTS:   · EKG Date/Time: 22-Jul-2018 15:48	  · Rate: 54	  · Interpretation: sinus elena, sinus arrythmia	  · AL: 204	  · QRS: 120	  · ST/T Wave: no st elevations	  · Other Findings: lafb	    pt was elena cardiac, first set of cardiac enzymes negative and BNP of 645  also found to have elevated s.creatinine (22 Jul 2018 21:26)      REVIEW OF SYSTEMS      General: Reasonably active for her age. 	    ENMT:	Denies any sx.     Respiratory and Thorax: As above.   	  Cardiovascular:	Known hx AF, no current palptn, or ch pain.     No GI,  sx.     Neurological: No issues. 	    Psychiatric:	Amiable, no undue interaction noted.     Hematology/Lymphatics:	 Hx of DVT, been on chr. a.c w/ coumadin    Allergic/Immunologic:	    No Known Allergies      Home Medications:  amLODIPine 5 mg oral tablet: 1 tab(s) orally once a day (22 Jul 2018 21:29)  Coumadin 2.5 mg oral tablet: 1 tab(s) orally every 48 hours (22 Jul 2018 21:29)  Coumadin 5 mg oral tablet: 1 tab(s) orally every 48 hours (22 Jul 2018 21:29)  ferrous sulfate 325 mg (65 mg elemental iron) oral tablet: 1 tab(s) orally once a day (22 Jul 2018 21:29)  Lipitor 10 mg oral tablet: 1 tab(s) orally once a day (22 Jul 2018 21:29)  Metoprolol Tartrate 50 mg oral tablet: 1 tab(s) orally 2 times a day (22 Jul 2018 21:29)  omeprazole 20 mg oral delayed release capsule: 1 cap(s) orally once a day (22 Jul 2018 21:30)  Plavix 75 mg oral tablet: 1 tab(s) orally every 48 hours (22 Jul 2018 21:29)  valsartan-hydrochlorothiazide 160mg-12.5mg oral tablet: 1 tab(s) orally once a day (22 Jul 2018 21:29)      MEDICATIONS  (STANDING):  amLODIPine   Tablet 5 milliGRAM(s) Oral daily  atorvastatin 10 milliGRAM(s) Oral at bedtime  clopidogrel Tablet 75 milliGRAM(s) Oral <User Schedule>  ferrous    sulfate 325 milliGRAM(s) Oral daily  hydrochlorothiazide 12.5 milliGRAM(s) Oral daily  lactated ringers. 1000 milliLiter(s) (75 mL/Hr) IV Continuous <Continuous>  pantoprazole    Tablet 40 milliGRAM(s) Oral before breakfast    MEDICATIONS  (PRN):      PAST MEDICAL & SURGICAL HISTORY:  OA (osteoarthritis)  Dyslipidemia  Lupus  Pulmonary embolism  DVT (deep venous thrombosis)  Coronary artery dilation  Hypertension  S/P aortic valve replacement with prosthetic valve  H/O: hysterectomy      T(C): 35.9 (07-23-18 @ 06:03), Max: 36.7 (07-22-18 @ 19:54)  HR: 67 (07-23-18 @ 06:03) (56 - 67)  BP: 129/83 (07-23-18 @ 06:03) (129/83 - 179/92)  RR: 18 (07-23-18 @ 06:03) (18 - 18)  SpO2: 99% (07-23-18 @ 08:13) (99% - 100%)    PHYSICAL EXAM:      Constitutional: Healthy look, NAD    Neck: Supple, no JVD or bruit    Respiratory: Good B/l AE. No adv sound    Cardiovascular: Regular S1, S2 SM noted.     Benign abdomen.     Extremities: No CCE     Vascular: Palp pedal pulse, No car bruit audible.     Neurological: No focal defict appreciated.     Psychiatric: No undue interaction noted.                               11.3   3.62  )-----------( 190      ( 23 Jul 2018 08:40 )             34.2       07-23    141  |  100  |  22<H>  ----------------------------<  90  4.0   |  31  |  0.9    Ca    9.7      23 Jul 2018 08:40  Mg     2.2     07-23    TPro  7.0  /  Alb  4.0  /  TBili  0.8  /  DBili  x   /  AST  26  /  ALT  17  /  AlkPhos  65  07-23      Urinalysis Basic - ( 22 Jul 2018 17:00 )    Color: Yellow / Appearance: Clear / SG: <=1.005 / pH: x  Gluc: x / Ketone: Negative  / Bili: Negative / Urobili: 0.2   Blood: x / Protein: Negative / Nitrite: Negative   Leuk Esterase: Small / RBC: 1-2 /HPF / WBC 3-5 /HPF   Sq Epi: x / Non Sq Epi: Many /HPF / Bacteria: Few /HPF        PT/INR - ( 23 Jul 2018 08:40 )   PT: 17.40 sec;   INR: 1.62 ratio         PTT - ( 23 Jul 2018 08:40 )  PTT:33.4 sec      07-22-18 @ 07:01  -  07-23-18 @ 07:00  --------------------------------------------------------  IN: 600 mL / OUT: 250 mL / NET: 350 mL    CXR :     Impression:      No radiographic evidence of acute cardiopulmonary disease.    Unchanged.      LUC RAYMUNDO M.D., ATTENDING RADIOLOGIST  This document has been electronically signed. Jul 23 2018  8:34AM    EKG :  Ventricular Rate 54 BPM    Atrial Rate 54 BPM    P-R Interval 204 ms    QRS Duration 120 ms    Q-T Interval 462 ms    QTC Calculation(Bezet) 438 ms    P Axis 44 degrees    R Axis -47 degrees    T Axis 32 degrees    Diagnosis Line Sinus bradycardia with sinus arrhythmia  Left anterior fascicular block  Left ventricular hypertrophy with QRS widening  Abnormal ECG    Confirmed by Judy Coombs MD ([id number]) on 7/23/2018 11:12:50 AM        SHORTNESS OF BREATH : Unexplained. ? Will cont current Tx, Await Echo, Cardio eval.   Hx A fib : STable, on sinus now. Cont a/c, monitor INR  AS s/p TAVR : stable.   Hx DVT, PE : cont ac.,   Dyslipidemia : cont statin  Hypertension : Cont med Tx.     Cont routine GI, DVT prophylaxis. OOB as tolerated.

## 2018-07-23 NOTE — CONSULT NOTE ADULT - SUBJECTIVE AND OBJECTIVE BOX
Date of Admission: 18    Evaluated by Dr. Maxx George MD contact no. 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    As per pt she was in her usual state of health when she started having dizziness ,while getting out of bed ,but can occur at rest ,also c/o exertional dyspnea and pressure like chest discomfort with exertion. Pt is not a good historian and was not able to explain clearly. No orthopnea, leg edema ,cough or PND, no fever, chills ,vomiting or abd pain .  Also c/o nausea for last 3 days ,and decrease PO intake .she took couple of Aleve in last week too for headache too.    In ER ,  EKG showed sinus elena cardia with no ST segment elevation or T wave inversion  RESULTS:   · EKG Date/Time: 2018 15:48	  · Rate: 54	  · Interpretation: sinus elena, sinus arrythmia	  · MO: 204	  · QRS: 120	  · ST/T Wave: no st elevations	  · Other Findings: lafb	    pt was elena cardiac, first set of cardiac enzymes negative and BNP of 645  also found to have elevated s.creatinine (2018 21:26)      PAST MEDICAL & SURGICAL HISTORY:  OA (osteoarthritis)  Dyslipidemia  Lupus  Pulmonary embolism  DVT (deep venous thrombosis)  Coronary artery dilation  Hypertension  S/P aortic valve replacement with prosthetic valve  H/O: hysterectomy    HEALTH ISSUES - PROBLEM Dx:        FAMILY HISTORY:  Family history of coronary artery disease (Father, Mother)    Allergies    No Known Allergies    	  Home Medications:  amLODIPine 5 mg oral tablet: 1 tab(s) orally once a day (2018 21:29)  Coumadin 2.5 mg oral tablet: 1 tab(s) orally every 48 hours (2018 21:29)  Coumadin 5 mg oral tablet: 1 tab(s) orally every 48 hours (2018 21:29)  ferrous sulfate 325 mg (65 mg elemental iron) oral tablet: 1 tab(s) orally once a day (2018 21:29)  Lipitor 10 mg oral tablet: 1 tab(s) orally once a day (2018 21:29)  Metoprolol Tartrate 50 mg oral tablet: 1 tab(s) orally 2 times a day (2018 21:29)  omeprazole 20 mg oral delayed release capsule: 1 cap(s) orally once a day (2018 21:30)  Plavix 75 mg oral tablet: 1 tab(s) orally every 48 hours (2018 21:29)  valsartan-hydrochlorothiazide 160mg-12.5mg oral tablet: 1 tab(s) orally once a day (2018 21:29)    MEDICATIONS  (STANDING):  amLODIPine   Tablet 5 milliGRAM(s) Oral daily  atorvastatin 10 milliGRAM(s) Oral at bedtime  clopidogrel Tablet 75 milliGRAM(s) Oral <User Schedule>  ferrous    sulfate 325 milliGRAM(s) Oral daily  hydrochlorothiazide 12.5 milliGRAM(s) Oral daily  lactated ringers. 1000 milliLiter(s) (75 mL/Hr) IV Continuous <Continuous>  pantoprazole    Tablet 40 milliGRAM(s) Oral before breakfast  warfarin 6 milliGRAM(s) Oral once    MEDICATIONS  (PRN):      SOCIAL HISTORY:    [ ] Non-smoker  [ ] Smoker  [ ] Alcohol    REVIEW OF SYSTEMS:  CONSTITUTIONAL: No fever, chills, or fatigue  CARDIOLOGY: Patient denies chest pain, shortness of breath, palpitations or syncopal episodes.   RESPIRATORY: denies shortness of breath, wheezing.   NEUROLOGICAL: Generalized weakness, no focal deficits to report.  ENDOCRINOLOGICAL: no recent change in diabetic medications.   GI: no melena/hematochezia, no Nausea, Vomiting, diarrhea.    PSYCHIATRY: normal mood and affect  HEENT: no epistaxis, headaches.  SKIN: no ecchymosis, no lesions.  MUSCULOSKELETAL: Full range of motion, pain in shoulder, spine.      PHYSICAL EXAM:  T(C): 36.2 (18 @ 20:14), Max: 36.2 (18 @ 20:14)  HR: 60 (18 @ 20:14) (59 - 67)  BP: 162/72 (18 @ 20:14) (129/83 - 162/72)  RR: 17 (18 @ 13:32) (17 - 18)  SpO2: 99% (18 @ 08:13) (99% - 99%)  Wt(kg): --  I&O's Summary    :  -  2018 07:00  --------------------------------------------------------  IN: 600 mL / OUT: 250 mL / NET: 350 mL    2018 07:  -  2018 21:32  --------------------------------------------------------  IN: 900 mL / OUT: 0 mL / NET: 900 mL      Daily Height in cm: 160.02 (2018 21:51)    Daily Weight in k.4 (2018 06:03)    General Appearance: Well developed, obese in no distress	  Cardiovascular:  S1 S2 normal intensity, No JVD, No murmurs or gallops.  No peripheral edema  Respiratory: Lungs clear to auscultation	  Psychiatry: A & O x 3, Mood & affect appropriate  Gastrointestinal:  Soft, Non-tender, no bruits.  Skin: No rashes, No ecchymoses, No cyanosis	  Neurologic: No focal motor deficits.  Extremities: No clubbing, cyanosis or edema  Vascular: Peripheral pulses palpable 2+ bilaterally    LABS:	 	                          11.3   3.62  )-----------( 190      ( 2018 08:40 )             34.2         141  |  100  |  22<H>  ----------------------------<  90  4.0   |  31  |  0.9    Ca    9.7      2018 08:40  Mg     2.2         TPro  7.0  /  Alb  4.0  /  TBili  0.8  /  DBili  x   /  AST  26  /  ALT  17  /  AlkPhos  65  23    CARDIAC MARKERS ( 2018 08:40 )  x     / <0.01 ng/mL / 88 U/L / x     / 2.9 ng/mL  CARDIAC MARKERS ( 2018 00:49 )  x     / <0.01 ng/mL / 85 U/L / x     / 2.4 ng/mL  CARDIAC MARKERS ( 2018 16:10 )  x     / <0.01 ng/mL / 90 U/L / x     / 2.2 ng/mL      PT/INR - ( 2018 08:40 )   PT: 17.40 sec;   INR: 1.62 ratio         PTT - ( 2018 08:40 )  PTT:33.4 sec    proBNP:   Lipid Profile:   HgA1c: Hemoglobin A1C, Whole Blood: 6.1 % ( @ 08:40)    TSH:     CARDIAC MARKERS:            TELEMETRY EVENTS: 	  sinus rhythm  ECG:  	Sinus bradycardia 54/mion, LVH, LAHB. No ST/T suggestive of ischemia.  RADIOLOGY:  OTHER: 	    PREVIOUS DIAGNOSTIC TESTING:    [ ] Echocardiogram:  [ ]  Catheterization:  [ ] Stress Test:  	  	  ASSESSMENT/PLAN: Date of Admission: 18    Evaluated by Dr. Maxx George MD contact no. [phone]    CHIEF COMPLAINT:  Dyspnea    HISTORY OF PRESENT ILLNESS:   83 yr F with hx of CAD s/p stents 6 (last ), HTN, DLD , DVT /PE on coumadin, s/p TAVR (2018 in [facility]) at which time the coronary stents were patent ,lupus (not on treatment), anemia ,OA ,has presented to ER with 3 weeks of dizziness exertional dyspnea and chest discomfort.    As per pt she was in her usual state of health when she started having dizziness ,while getting out of bed ,but can occur at rest ,also c/o exertional dyspnea and pressure like chest discomfort with exertion. Pt is not a good historian and was not able to explain clearly. No orthopnea, leg edema ,cough or PND, no fever, chills ,vomiting or abd pain .  Also c/o nausea for last 3 days ,and decrease PO intake .she took couple of Aleve in last week too for headache too.    In ER ,  EKG showed sinus elena cardia with no ST segment elevation or T wave inversion  RESULTS:   · EKG Date/Time: 2018 15:48	  · Rate: 54	  · Interpretation: sinus elena, sinus arrythmia	  · CT: 204	  · QRS: 120	  · ST/T Wave: no st elevations	    pt was elena cardiac, first set of cardiac enzymes negative and BNP of 645      PAST MEDICAL & SURGICAL HISTORY:  OA (osteoarthritis)  Dyslipidemia  Lupus  Pulmonary embolism  DVT (deep venous thrombosis)  Coronary artery dilation  Hypertension  S/P aortic valve replacement with prosthetic valve  H/O: hysterectomy    FAMILY HISTORY:  Family history of coronary artery disease (Father, Mother)    Allergies    No Known Allergies    Home Medications:  amLODIPine 5 mg oral tablet: 1 tab(s) orally once a day (2018 21:29)  Coumadin 2.5 mg oral tablet: 1 tab(s) orally every 48 hours (2018 21:29)  Coumadin 5 mg oral tablet: 1 tab(s) orally every 48 hours (2018 21:29)  ferrous sulfate 325 mg (65 mg elemental iron) oral tablet: 1 tab(s) orally once a day (2018 21:29)  Lipitor 10 mg oral tablet: 1 tab(s) orally once a day (2018 21:29)  Metoprolol Tartrate 50 mg oral tablet: 1 tab(s) orally 2 times a day (2018 21:29)  omeprazole 20 mg oral delayed release capsule: 1 cap(s) orally once a day (2018 21:30)  Plavix 75 mg oral tablet: 1 tab(s) orally every 48 hours (2018 21:29)  valsartan-hydrochlorothiazide 160mg-12.5mg oral tablet: 1 tab(s) orally once a day (2018 21:29)    MEDICATIONS  (STANDING):  amLODIPine   Tablet 5 milliGRAM(s) Oral daily  atorvastatin 10 milliGRAM(s) Oral at bedtime  clopidogrel Tablet 75 milliGRAM(s) Oral <User Schedule>  ferrous    sulfate 325 milliGRAM(s) Oral daily  hydrochlorothiazide 12.5 milliGRAM(s) Oral daily  lactated ringers. 1000 milliLiter(s) (75 mL/Hr) IV Continuous <Continuous>  pantoprazole    Tablet 40 milliGRAM(s) Oral before breakfast  warfarin 6 milliGRAM(s) Oral once    SOCIAL HISTORY:    [ ] Non-smoker  [ ] Smokerl    REVIEW OF SYSTEMS:  CONSTITUTIONAL: No fever, chills, or fatigue  CARDIOLOGY: Patient denies chest pain, shortness of breath, palpitations or syncopal episodes.   RESPIRATORY: denies shortness of breath, wheezing.   NEUROLOGICAL: Generalized weakness, headache this evening.  ENDOCRINOLOGICAL: no recent change in diabetic medications.   GI: no melena/hematochezia, no Nausea, Vomiting, diarrhea.    PSYCHIATRY: normal mood and affect  HEENT: no epistaxis, headaches.  SKIN: no ecchymosis, no lesions.  MUSCULOSKELETAL: Full range of motion, pain in shoulder, spine.      PHYSICAL EXAM:  T(C): 36.2 (18 @ 20:14), Max: 36.2 (18 @ 20:14)  HR: 60 (18 @ 20:14) (59 - 67)  BP: 162/72 (18 @ 20:14) (129/83 - 162/72)  RR: 17 (18 @ 13:32) (17 - 18)  SpO2: 99% (18 @ 08:13) (99% - 99%)  Wt(kg): --  I&O's Summary    :  -  2018 07:00  --------------------------------------------------------  IN: 600 mL / OUT: 250 mL / NET: 350 mL    2018 07:  -  2018 21:32  --------------------------------------------------------  IN: 900 mL / OUT: 0 mL / NET: 900 mL      Daily Height in cm: 160.02 (2018 21:51)    Daily Weight in k.4 (2018 06:03)    General Appearance: Well developed, obese in no distress	  Cardiovascular:  S1 S2 normal intensity, No JVD/gallops.  2/6 REMY at base. No peripheral edema  Respiratory: Lungs clear to auscultation	  Psychiatry: A & O x 3, Mood & affect appropriate  Gastrointestinal:  Soft, Non-tender, no bruits.  Skin: No rashes, No ecchymoses, No cyanosis	  Neurologic: No focal motor deficits.  Extremities: No clubbing, cyanosis or edema  Vascular: Peripheral pulses palpable 2+ bilaterally    LABS:	 	                          11.3   3.62  )-----------( 190      ( 2018 08:40 )             34.2     07-23    141  |  100  |  22<H>  ----------------------------<  90  4.0   |  31  |  0.9    Ca    9.7      2018 08:40  Mg     2.2     23    TPro  7.0  /  Alb  4.0  /  TBili  0.8  /  DBili  x   /  AST  26  /  ALT  17  /  AlkPhos  65  0723    CARDIAC MARKERS ( 2018 08:40 )  x     / <0.01 ng/mL / 88 U/L / x     / 2.9 ng/mL  CARDIAC MARKERS ( 2018 00:49 )  x     / <0.01 ng/mL / 85 U/L / x     / 2.4 ng/mL  CARDIAC MARKERS ( 2018 16:10 )  x     / <0.01 ng/mL / 90 U/L / x     / 2.2 ng/mL      PT/INR - ( 2018 08:40 )   PT: 17.40 sec;   INR: 1.62 ratio         PTT - ( 2018 08:40 )  PTT:33.4 sec    proBNP:   Lipid Profile:   HgA1c: Hemoglobin A1C, Whole Blood: 6.1 % ( @ 08:40)    TSH:     CARDIAC MARKERS:            TELEMETRY EVENTS: 	  sinus rhythm  ECG:  	Sinus bradycardia 54/mion, LVH, LAHB. No ST/T suggestive of ischemia.  RADIOLOGY:  OTHER: 	    PREVIOUS DIAGNOSTIC TESTING:    [ ] Echocardiogram:  [ ]  Catheterization:  [ ] Stress Test:  	  	  ASSESSMENT/PLAN:

## 2018-07-23 NOTE — PROGRESS NOTE ADULT - SUBJECTIVE AND OBJECTIVE BOX
SUBJECTIVE:    Patient is a 83y old Female who presents with a chief complaint of dizziness, dyspnea of exertion and chest discomfort for 3 weeks (22 Jul 2018 21:26)    Currently admitted to medicine with the primary diagnosis of SOB (shortness of breath)     Today is hospital day 1d. This morning she is resting comfortably in bed and reports no new issues or overnight events.     PAST MEDICAL & SURGICAL HISTORY  OA (osteoarthritis)  Dyslipidemia  Lupus  Pulmonary embolism  DVT (deep venous thrombosis)  Coronary artery dilation  Hypertension  S/P aortic valve replacement with prosthetic valve  H/O: hysterectomy    SOCIAL HISTORY:  Negative for smoking/alcohol/drug use.     ALLERGIES:  No Known Allergies    MEDICATIONS:  STANDING MEDICATIONS  amLODIPine   Tablet 5 milliGRAM(s) Oral daily  atorvastatin 10 milliGRAM(s) Oral at bedtime  clopidogrel Tablet 75 milliGRAM(s) Oral <User Schedule>  ferrous    sulfate 325 milliGRAM(s) Oral daily  lactated ringers. 1000 milliLiter(s) IV Continuous <Continuous>  pantoprazole    Tablet 40 milliGRAM(s) Oral before breakfast    VITALS:   T(F): 96.6  HR: 67  BP: 129/83  RR: 18  SpO2: 99%    LABS:                        11.3   3.62  )-----------( 190      ( 23 Jul 2018 08:40 )             34.2     07-23    141  |  100  |  22<H>  ----------------------------<  90  4.0   |  31  |  0.9    Ca    9.7      23 Jul 2018 08:40  Mg     2.2     07-23    TPro  7.0  /  Alb  4.0  /  TBili  0.8  /  DBili  x   /  AST  26  /  ALT  17  /  AlkPhos  65  07-23    PT/INR - ( 23 Jul 2018 08:40 )   PT: 17.40 sec;   INR: 1.62 ratio         PTT - ( 23 Jul 2018 08:40 )  PTT:33.4 sec  Urinalysis Basic - ( 22 Jul 2018 17:00 )    Color: Yellow / Appearance: Clear / SG: <=1.005 / pH: x  Gluc: x / Ketone: Negative  / Bili: Negative / Urobili: 0.2   Blood: x / Protein: Negative / Nitrite: Negative   Leuk Esterase: Small / RBC: 1-2 /HPF / WBC 3-5 /HPF   Sq Epi: x / Non Sq Epi: Many /HPF / Bacteria: Few /HPF        Creatine Kinase, Serum: 88 U/L (07-23-18 @ 08:40)  Troponin T, Serum: <0.01 ng/mL (07-23-18 @ 08:40)  Troponin T, Serum: <0.01 ng/mL (07-23-18 @ 00:49)  Creatine Kinase, Serum: 85 U/L (07-23-18 @ 00:49)  Troponin T, Serum: <0.01 ng/mL (07-22-18 @ 16:10)  Creatine Kinase, Serum: 90 U/L (07-22-18 @ 16:10)      CARDIAC MARKERS ( 23 Jul 2018 08:40 )  x     / <0.01 ng/mL / 88 U/L / x     / 2.9 ng/mL  CARDIAC MARKERS ( 23 Jul 2018 00:49 )  x     / <0.01 ng/mL / 85 U/L / x     / 2.4 ng/mL  CARDIAC MARKERS ( 22 Jul 2018 16:10 )  x     / <0.01 ng/mL / 90 U/L / x     / 2.2 ng/mL      Troponin T, Serum: <0.01 ng/mL (07-23-18 @ 08:40)  Troponin T, Serum: <0.01 ng/mL (07-23-18 @ 00:49)  Troponin T, Serum: <0.01 ng/mL (07-22-18 @ 16:10)  Serum Pro-Brain Natriuretic Peptide: 645 pg/mL (07-22-18 @ 16:10)    PHYSICAL EXAM:  GENERAL: NAD, well-groomed, well-developed  HEAD:  NCAT  EYES: EOMI, PERRLA, conjunctiva and sclera clear  NECK: Supple, No JVD, Normal thyroid  NERVOUS SYSTEM: AAOX4  CHEST/LUNG: CTA b/l no w/r/r  HEART: +s1s2 RRR no m/g/r  ABDOMEN: soft, NT/ND (+) bs, no HSM  EXTREMITIES:  2+ Peripheral Pulses, No c/c/e  LYMPH: No lymphadenopathy noted  SKIN: No rashes or lesions SUBJECTIVE:    Patient is a 83y old Female who presents with a chief complaint of dizziness, dyspnea of exertion and chest discomfort for 3 weeks (22 Jul 2018 21:26)    Currently admitted to medicine with the primary diagnosis of SOB (shortness of breath)     Today is hospital day 1d. This morning she is resting comfortably in bed and reports no new issues or overnight events.     PAST MEDICAL & SURGICAL HISTORY  OA (osteoarthritis)  Dyslipidemia  Lupus  Pulmonary embolism  DVT (deep venous thrombosis)  Coronary artery dilation  Hypertension  S/P aortic valve replacement with prosthetic valve  H/O: hysterectomy    SOCIAL HISTORY:  Negative for smoking/alcohol/drug use.     ALLERGIES:  No Known Allergies    MEDICATIONS:  STANDING MEDICATIONS  amLODIPine   Tablet 5 milliGRAM(s) Oral daily  atorvastatin 10 milliGRAM(s) Oral at bedtime  clopidogrel Tablet 75 milliGRAM(s) Oral <User Schedule>  ferrous    sulfate 325 milliGRAM(s) Oral daily  lactated ringers. 1000 milliLiter(s) IV Continuous <Continuous>  pantoprazole    Tablet 40 milliGRAM(s) Oral before breakfast    VITALS:   T(F): 96.6  HR: 67  BP: 129/83  RR: 18  SpO2: 99%    LABS:                        11.3   3.62  )-----------( 190      ( 23 Jul 2018 08:40 )             34.2     07-23    141  |  100  |  22<H>  ----------------------------<  90  4.0   |  31  |  0.9    Ca    9.7      23 Jul 2018 08:40  Mg     2.2     07-23    TPro  7.0  /  Alb  4.0  /  TBili  0.8  /  DBili  x   /  AST  26  /  ALT  17  /  AlkPhos  65  07-23    PT/INR - ( 23 Jul 2018 08:40 )   PT: 17.40 sec;   INR: 1.62 ratio         PTT - ( 23 Jul 2018 08:40 )  PTT:33.4 sec  Urinalysis Basic - ( 22 Jul 2018 17:00 )    Color: Yellow / Appearance: Clear / SG: <=1.005 / pH: x  Gluc: x / Ketone: Negative  / Bili: Negative / Urobili: 0.2   Blood: x / Protein: Negative / Nitrite: Negative   Leuk Esterase: Small / RBC: 1-2 /HPF / WBC 3-5 /HPF   Sq Epi: x / Non Sq Epi: Many /HPF / Bacteria: Few /HPF        Creatine Kinase, Serum: 88 U/L (07-23-18 @ 08:40)  Troponin T, Serum: <0.01 ng/mL (07-23-18 @ 08:40)  Troponin T, Serum: <0.01 ng/mL (07-23-18 @ 00:49)  Creatine Kinase, Serum: 85 U/L (07-23-18 @ 00:49)  Troponin T, Serum: <0.01 ng/mL (07-22-18 @ 16:10)  Creatine Kinase, Serum: 90 U/L (07-22-18 @ 16:10)      CARDIAC MARKERS ( 23 Jul 2018 08:40 )  x     / <0.01 ng/mL / 88 U/L / x     / 2.9 ng/mL  CARDIAC MARKERS ( 23 Jul 2018 00:49 )  x     / <0.01 ng/mL / 85 U/L / x     / 2.4 ng/mL  CARDIAC MARKERS ( 22 Jul 2018 16:10 )  x     / <0.01 ng/mL / 90 U/L / x     / 2.2 ng/mL      Troponin T, Serum: <0.01 ng/mL (07-23-18 @ 08:40)  Troponin T, Serum: <0.01 ng/mL (07-23-18 @ 00:49)  Troponin T, Serum: <0.01 ng/mL (07-22-18 @ 16:10)  Serum Pro-Brain Natriuretic Peptide: 645 pg/mL (07-22-18 @ 16:10)    ECHO:  1. Left ventricular ejection fraction, by visual estimation, is 60 to   65%.   2. Normal global left ventricular systolic function.   3. Moderate concentric left ventricular hypertrophy.   4. Moderate to severe mitral annular calcification.   5. Thickening and calcification of the anterior and posterior mitral   valve leaflets.   6. TAVR.   7. No perivalvular regurgitation.    PHYSICAL EXAM:  GENERAL: NAD, well-groomed, well-developed  HEAD:  NCAT  EYES: EOMI, PERRLA, conjunctiva and sclera clear  NECK: Supple, No JVD, Normal thyroid  NERVOUS SYSTEM: AAOX4  CHEST/LUNG: CTA b/l no w/r/r  HEART: +s1s2 RRR no m/g/r  ABDOMEN: soft, NT/ND (+) bs, no HSM  EXTREMITIES:  2+ Peripheral Pulses, No c/c/e  LYMPH: No lymphadenopathy noted  SKIN: No rashes or lesions

## 2018-07-23 NOTE — PROGRESS NOTE ADULT - ASSESSMENT
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    Exertional dyspnea/Chest discomfort in known CAD patient;  r/o ACS; Likely UA   -first set of CE negative x3 ,high BNP  -f/u cardio  -last cardiac cath in 7/14 shows non obstructive with patent stents in RCA and LAD   -2D echo(last in record  from 2015 with nL EF ,G1DD and AS)    Dizziness /nausea;  -sinus elena on EKG  -c/w tele monitoring and hold metoprolol    CAD/TVAR/HTN/DLD;  -c/w plavix, statin and amlodipine   -d/c HCTZ  -hold valsartan and metoprolol    DVT/PE on coumadin  -c/w coumadin 6mg  -moniter INR      CONOR   -last s.cr 0.92 in 8/17  -hx of naproxen and decreases PO intake  -likely pre renal  -follow urine studies  -hold valsartan and avoid nephrotoxic meds  -c/w gentle hydration  - Intake and output monitoring    DVT ppx  -on coumadin    GI ppx;  -c/w  protonix    Diet;  DASH and renal    FULL CODE 83 yr F with hx of CAD s/p stents 6 (last 2005),HTN, DLD , 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    Exertional dyspnea/Chest discomfort in known CAD patient;  r/o ACS; Likely UA   -first set of CE negative x3 ,high BNP  -f/u cardio  -last cardiac cath in 7/14 shows non obstructive with patent stents in RCA and LAD   -2D echo(2015:nl EF ,G1DD and AS): LVEF 60 to 65%. Normal global left ventricular systolic function. Moderate concentric left ventricular hypertrophy. Moderate to severe mitral annular calcification. Thickening and calcification of the anterior and posterior mitral   valve leaflets. TAVR. No perivalvular regurgitation.    Dizziness /nausea;  -sinus elena on EKG  -c/w tele monitoring and hold metoprolol    CAD/TVAR/HTN/DLD;  -c/w plavix, statin and amlodipine   -d/c HCTZ  -hold valsartan and metoprolol    DVT/PE on coumadin  -c/w coumadin 6mg  -moniter INR      CONOR   -last s.cr 0.92 in 8/17  -hx of naproxen and decreases PO intake  -likely pre renal  -follow urine studies  -hold valsartan and avoid nephrotoxic meds  -c/w gentle hydration  - Intake and output monitoring    DVT ppx  -on coumadin    GI ppx;  -c/w  protonix    Diet;  DASH and renal    FULL CODE

## 2018-07-24 LAB
ANION GAP SERPL CALC-SCNC: 11 MMOL/L — SIGNIFICANT CHANGE UP (ref 7–14)
BASOPHILS # BLD AUTO: 0.03 K/UL — SIGNIFICANT CHANGE UP (ref 0–0.2)
BASOPHILS NFR BLD AUTO: 0.9 % — SIGNIFICANT CHANGE UP (ref 0–1)
BUN SERPL-MCNC: 14 MG/DL — SIGNIFICANT CHANGE UP (ref 10–20)
CALCIUM SERPL-MCNC: 9.7 MG/DL — SIGNIFICANT CHANGE UP (ref 8.5–10.1)
CHLORIDE SERPL-SCNC: 101 MMOL/L — SIGNIFICANT CHANGE UP (ref 98–110)
CO2 SERPL-SCNC: 30 MMOL/L — SIGNIFICANT CHANGE UP (ref 17–32)
CREAT SERPL-MCNC: 0.9 MG/DL — SIGNIFICANT CHANGE UP (ref 0.7–1.5)
EOSINOPHIL # BLD AUTO: 0.08 K/UL — SIGNIFICANT CHANGE UP (ref 0–0.7)
EOSINOPHIL NFR BLD AUTO: 2.4 % — SIGNIFICANT CHANGE UP (ref 0–8)
GLUCOSE SERPL-MCNC: 98 MG/DL — SIGNIFICANT CHANGE UP (ref 70–99)
HCT VFR BLD CALC: 34.6 % — LOW (ref 37–47)
HGB BLD-MCNC: 11.5 G/DL — LOW (ref 12–16)
IMM GRANULOCYTES NFR BLD AUTO: 0.3 % — SIGNIFICANT CHANGE UP (ref 0.1–0.3)
INR BLD: 2 RATIO — HIGH (ref 0.65–1.3)
LYMPHOCYTES # BLD AUTO: 1.23 K/UL — SIGNIFICANT CHANGE UP (ref 1.2–3.4)
LYMPHOCYTES # BLD AUTO: 37.3 % — SIGNIFICANT CHANGE UP (ref 20.5–51.1)
MAGNESIUM SERPL-MCNC: 1.9 MG/DL — SIGNIFICANT CHANGE UP (ref 1.8–2.4)
MCHC RBC-ENTMCNC: 29 PG — SIGNIFICANT CHANGE UP (ref 27–31)
MCHC RBC-ENTMCNC: 33.2 G/DL — SIGNIFICANT CHANGE UP (ref 32–37)
MCV RBC AUTO: 87.4 FL — SIGNIFICANT CHANGE UP (ref 81–99)
MONOCYTES # BLD AUTO: 0.38 K/UL — SIGNIFICANT CHANGE UP (ref 0.1–0.6)
MONOCYTES NFR BLD AUTO: 11.5 % — HIGH (ref 1.7–9.3)
NEUTROPHILS # BLD AUTO: 1.57 K/UL — SIGNIFICANT CHANGE UP (ref 1.4–6.5)
NEUTROPHILS NFR BLD AUTO: 47.6 % — SIGNIFICANT CHANGE UP (ref 42.2–75.2)
NRBC # BLD: 0 /100 WBCS — SIGNIFICANT CHANGE UP (ref 0–0)
PLATELET # BLD AUTO: 278 K/UL — SIGNIFICANT CHANGE UP (ref 130–400)
POTASSIUM SERPL-MCNC: 4 MMOL/L — SIGNIFICANT CHANGE UP (ref 3.5–5)
POTASSIUM SERPL-SCNC: 4 MMOL/L — SIGNIFICANT CHANGE UP (ref 3.5–5)
PROTHROM AB SERPL-ACNC: 21.4 SEC — HIGH (ref 9.95–12.87)
RBC # BLD: 3.96 M/UL — LOW (ref 4.2–5.4)
RBC # FLD: 15.5 % — HIGH (ref 11.5–14.5)
SODIUM SERPL-SCNC: 142 MMOL/L — SIGNIFICANT CHANGE UP (ref 135–146)
T4 AB SER-ACNC: 7.3 UG/DL — SIGNIFICANT CHANGE UP (ref 4.6–12)
TSH SERPL-MCNC: 1.8 UIU/ML — SIGNIFICANT CHANGE UP (ref 0.27–4.2)
WBC # BLD: 3.3 K/UL — LOW (ref 4.8–10.8)
WBC # FLD AUTO: 3.3 K/UL — LOW (ref 4.8–10.8)

## 2018-07-24 RX ORDER — METOPROLOL TARTRATE 50 MG
50 TABLET ORAL
Qty: 0 | Refills: 0 | Status: DISCONTINUED | OUTPATIENT
Start: 2018-07-24 | End: 2018-07-24

## 2018-07-24 RX ORDER — METOPROLOL TARTRATE 50 MG
25 TABLET ORAL
Qty: 0 | Refills: 0 | Status: DISCONTINUED | OUTPATIENT
Start: 2018-07-24 | End: 2018-07-25

## 2018-07-24 RX ORDER — WARFARIN SODIUM 2.5 MG/1
6 TABLET ORAL ONCE
Qty: 0 | Refills: 0 | Status: COMPLETED | OUTPATIENT
Start: 2018-07-24 | End: 2018-07-24

## 2018-07-24 RX ADMIN — LOSARTAN POTASSIUM 50 MILLIGRAM(S): 100 TABLET, FILM COATED ORAL at 05:41

## 2018-07-24 RX ADMIN — Medication 650 MILLIGRAM(S): at 06:35

## 2018-07-24 RX ADMIN — CLOPIDOGREL BISULFATE 75 MILLIGRAM(S): 75 TABLET, FILM COATED ORAL at 05:41

## 2018-07-24 RX ADMIN — PANTOPRAZOLE SODIUM 40 MILLIGRAM(S): 20 TABLET, DELAYED RELEASE ORAL at 06:17

## 2018-07-24 RX ADMIN — Medication 12.5 MILLIGRAM(S): at 05:41

## 2018-07-24 RX ADMIN — Medication 50 MILLIGRAM(S): at 09:16

## 2018-07-24 RX ADMIN — WARFARIN SODIUM 6 MILLIGRAM(S): 2.5 TABLET ORAL at 21:18

## 2018-07-24 RX ADMIN — ATORVASTATIN CALCIUM 10 MILLIGRAM(S): 80 TABLET, FILM COATED ORAL at 21:18

## 2018-07-24 RX ADMIN — Medication 650 MILLIGRAM(S): at 00:00

## 2018-07-24 RX ADMIN — Medication 650 MILLIGRAM(S): at 05:42

## 2018-07-24 RX ADMIN — AMLODIPINE BESYLATE 5 MILLIGRAM(S): 2.5 TABLET ORAL at 05:41

## 2018-07-24 RX ADMIN — Medication 325 MILLIGRAM(S): at 11:18

## 2018-07-24 RX ADMIN — Medication 25 MILLIGRAM(S): at 17:33

## 2018-07-24 NOTE — PROGRESS NOTE ADULT - ASSESSMENT
Exertional dyspnea/Chest discomfort - MI ruled out.    cath prior to TAVR: non obstructive ds with patent stents in RCA and LAD . No indication for stress test at present.  She may be discharged and follow up with Dr. Wilcox in 2 weeks.    Hypertension - controlled.  on Amlodipine 5 mg daily, Metoprolol 25 mg q12H and Losartan 50 mg q AM     Dizziness /nausea; resolved.     CAD/TVAR/HTN/DLD;  -c/w plavix, statin, Metoprolol and amlodipine     DVT/PE on coumadin      GI ppx;  -c/w  protonix

## 2018-07-24 NOTE — CONSULT NOTE ADULT - SUBJECTIVE AND OBJECTIVE BOX
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    As per pt she was in her usual state of health when she started having dizziness ,while getting out of bed ,but can occur at rest ,also c/o exertional dyspnea and pressure like chest discomfort with exertion. Pt is not a good historian and was not able to explain clearly. No orthopnea, leg edema ,cough or PND, no fever, chills ,vomiting or abd pain .  Also c/o nausea for last 3 days ,and decrease PO intake .she took couple of Aleve in last week too for headache too.    In ER ,  EKG showed sinus elena cardia with no ST segment elevation or T wave inversion  RESULTS:   · EKG Date/Time: 22-Jul-2018 15:48	  · Rate: 54	  · Interpretation: sinus elena, sinus arrythmia	  · KY: 204	  · QRS: 120	  · ST/T Wave: no st elevations	  · Other Findings: lafb	    pt was elena cardiac, first set of cardiac enzymes negative and BNP of 645  also found to have elevated s.creatinine (22 Jul 2018 21:26)      PAST MEDICAL & SURGICAL HISTORY:  OA (osteoarthritis)  Dyslipidemia  Lupus  Pulmonary embolism  DVT (deep venous thrombosis)  Coronary artery dilation  Hypertension  S/P aortic valve replacement with prosthetic valve  H/O: hysterectomy      Hospital Course:    TODAY'S SUBJECTIVE & REVIEW OF SYMPTOMS:     Constitutional WNL   Cardio WNL   Resp sob   GI WNL  Heme WNL  Endo WNL  Skin WNL  MSK WNL  Neuro WNL  Cognitive WNL  Psych WNL      MEDICATIONS  (STANDING):  amLODIPine   Tablet 5 milliGRAM(s) Oral daily  atorvastatin 10 milliGRAM(s) Oral at bedtime  clopidogrel Tablet 75 milliGRAM(s) Oral <User Schedule>  ferrous    sulfate 325 milliGRAM(s) Oral daily  losartan 50 milliGRAM(s) Oral daily  metoprolol tartrate 25 milliGRAM(s) Oral two times a day  pantoprazole    Tablet 40 milliGRAM(s) Oral before breakfast    MEDICATIONS  (PRN):  acetaminophen   Tablet. 650 milliGRAM(s) Oral every 6 hours PRN Moderate Pain (4 - 6)      FAMILY HISTORY:  Family history of coronary artery disease (Father, Mother)      Allergies    No Known Allergies    Intolerances        SOCIAL HISTORY:    [  ] Etoh  [  ] Smoking  [  ] Substance abuse     Home Environment:  [  ] Home Alone  [ x ] Lives with Family  [  ] Home Health Aid    Dwelling:  [  ] Apartment  [x  ] Private House  [  ] Adult Home  [  ] Skilled Nursing Facility      [  ] Short Term  [  ] Long Term  [ x ] Stairs       Elevator [  ]    FUNCTIONAL STATUS PTA: (Check all that apply)  Ambulation: [ x  ]Independent    [  ] Dependent     [  ] Non-Ambulatory  Assistive Device: [  ] SA Cane  [  ]  Q Cane  [  ] Walker  [  ]  Wheelchair  ADL : [x  ] Independent  [  ]  Dependent       Vital Signs Last 24 Hrs  T(C): 35.6 (24 Jul 2018 06:09), Max: 36.2 (23 Jul 2018 20:14)  T(F): 96.1 (24 Jul 2018 06:09), Max: 97.2 (23 Jul 2018 20:14)  HR: 66 (24 Jul 2018 14:35) (60 - 112)  BP: 149/66 (24 Jul 2018 14:35) (130/80 - 180/87)  BP(mean): 104 (24 Jul 2018 00:00) (104 - 125)  RR: 18 (24 Jul 2018 14:35) (18 - 18)  SpO2: 99% (24 Jul 2018 09:18) (99% - 99%)      PHYSICAL EXAM: Alert & Oriented X3  GENERAL: NAD, well-groomed, well-developed  HEAD:  Atraumatic, Normocephalic  CHEST/LUNG: Clear   HEART: S1S2+  ABDOMEN: Soft, Nontender  EXTREMITIES:  no calf tenderness    NERVOUS SYSTEM:  Cranial Nerves 2-12 intact [  ] Abnormal  [  ]  ROM: WFL all extremities [ x ]  Abnormal [  ]  Motor Strength: WFL all extremities  [x  ]  Abnormal [  ]  Sensation: intact to light touch [ x ] Abnormal [  ]  Reflexes: Symmetric [  ]  Abnormal [  ]    FUNCTIONAL STATUS:  Bed Mobility: Independent [  ]  Supervision [  ]  Needs Assistance [x  ]  N/A [  ]  Transfers: Independent [  ]  Supervision [  ]  Needs Assistance[ x]  N/A [  ]   Ambulation: Independent [  ]  Supervision [  ]  Needs Assistance [x  ]  N/A [  ]  ADL: Independent [  ] Requires Assistance [  ] N/A [  ]      LABS:                        11.5   3.30  )-----------( 278      ( 24 Jul 2018 07:00 )             34.6     07-24    142  |  101  |  14  ----------------------------<  98  4.0   |  30  |  0.9    Ca    9.7      24 Jul 2018 07:00  Mg     1.9     07-24    TPro  7.0  /  Alb  4.0  /  TBili  0.8  /  DBili  x   /  AST  26  /  ALT  17  /  AlkPhos  65  07-23    PT/INR - ( 23 Jul 2018 08:40 )   PT: 17.40 sec;   INR: 1.62 ratio         PTT - ( 23 Jul 2018 08:40 )  PTT:33.4 sec  Urinalysis Basic - ( 22 Jul 2018 17:00 )    Color: Yellow / Appearance: Clear / SG: <=1.005 / pH: x  Gluc: x / Ketone: Negative  / Bili: Negative / Urobili: 0.2   Blood: x / Protein: Negative / Nitrite: Negative   Leuk Esterase: Small / RBC: 1-2 /HPF / WBC 3-5 /HPF   Sq Epi: x / Non Sq Epi: Many /HPF / Bacteria: Few /HPF        RADIOLOGY & ADDITIONAL STUDIES:    Assesment:

## 2018-07-24 NOTE — PROGRESS NOTE ADULT - ASSESSMENT
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    Exertional dyspnea/Chest discomfort in known CAD patient;  r/o ACS; Likely UA   -first set of CE negative x3 ,high BNP  -last cardiac cath in 7/14 shows non obstructive with patent stents in RCA and LAD   -2D echo(2015:nl EF ,G1DD and AS): LVEF 60 to 65%. Normal global left ventricular systolic function. Moderate concentric left ventricular hypertrophy. Moderate to severe mitral annular calcification. Thickening and calcification of the anterior and posterior mitral   valve leaflets. TAVR. No perivalvular regurgitation.    Dizziness /nausea;  -sinus elena on EKG  -c/w tele monitoring and hold metoprolol. Resumed as HR was 126-120. If elena cardic can hold and resume if HR >70.    CAD/TVAR/HTN/DLD;  -c/w plavix, statin and amlodipine, losartan  -d/c HCTZ, valsartan and metoprolol as per cardio    DVT/PE on coumadin  -c/w coumadin 6mg  -moniter INR    CONOR   -last s.cr 0.92 in 8/17  -hx of naproxen and decreases PO intake  -likely pre renal  -nephrotoxic meds  - Intake and output monitoring    DVT ppx  -on coumadin    GI ppx;  -c/w  protonix    Diet;  DASH and renal    Dispo  -home  -f/u PT/rehab    FULL CODE

## 2018-07-24 NOTE — PROGRESS NOTE ADULT - SUBJECTIVE AND OBJECTIVE BOX
07-24-18 @ 12:45    PATRICIA GEMMA  83y  Female  Comfortable in bed. Did walk around a little. Couldn't sleep properly 2' surrounding disturbances. This AM felt little uneasy.     INTERVAL EVENTS: While being cleaned etc, she became tach > 140.     MEDICATIONS  (STANDING):  amLODIPine   Tablet 5 milliGRAM(s) Oral daily  atorvastatin 10 milliGRAM(s) Oral at bedtime  clopidogrel Tablet 75 milliGRAM(s) Oral <User Schedule>  ferrous    sulfate 325 milliGRAM(s) Oral daily  losartan 50 milliGRAM(s) Oral daily  metoprolol tartrate 50 milliGRAM(s) Oral two times a day  pantoprazole    Tablet 40 milliGRAM(s) Oral before breakfast    MEDICATIONS  (PRN):  acetaminophen   Tablet. 650 milliGRAM(s) Oral every 6 hours PRN Moderate Pain (4 - 6)      T(C): 35.6 (07-24-18 @ 06:09), Max: 36.2 (07-23-18 @ 20:14)  HR: 112 (07-24-18 @ 09:18) (60 - 112)  BP: 130/80 (07-24-18 @ 09:18) (130/80 - 180/87)  RR: 17 (07-23-18 @ 13:32) (17 - 17)  SpO2: 99% (07-24-18 @ 09:18) (99% - 99%)  Wt(kg): --Vital Signs Last 24 Hrs  T(C): 35.6 (24 Jul 2018 06:09), Max: 36.2 (23 Jul 2018 20:14)  T(F): 96.1 (24 Jul 2018 06:09), Max: 97.2 (23 Jul 2018 20:14)  HR: 112 (24 Jul 2018 09:18) (60 - 112)  BP: 130/80 (24 Jul 2018 09:18) (130/80 - 180/87)  BP(mean): 104 (24 Jul 2018 00:00) (104 - 125)  RR: 17 (23 Jul 2018 13:32) (17 - 17)  SpO2: 99% (24 Jul 2018 09:18) (99% - 99%)    PHYSICAL EXAM:  GENERAL: NAD, healthy look for age.   NECK: no bruit, no JVD  CHEST/LUNG: Good b/l AE. No adv sound  HEART: S1S2, regular, SM present.   ABDOMEN: benign, no HJR  EXTREMITIES: no CCE                          11.5   3.30  )-----------( 278      ( 24 Jul 2018 07:00 )             34.6     07-24    142  |  101  |  14  ----------------------------<  98  4.0   |  30  |  0.9    Ca    9.7      24 Jul 2018 07:00  Mg     1.9     07-24    TPro  7.0  /  Alb  4.0  /  TBili  0.8  /  DBili  x   /  AST  26  /  ALT  17  /  AlkPhos  65  07-23      PT/INR - ( 23 Jul 2018 08:40 )   PT: 17.40 sec;   INR: 1.62 ratio         PTT - ( 23 Jul 2018 08:40 )  PTT:33.4 sec      RADIOLOGY & ADDITIONAL TESTS:      ASSESSMENT / PLAN  :    SOB : uncertain. Seen by Dr. Perez. to f/u, ? st test  Hx A fib. in sinus. Had tachy this am, BB resumed.   AS s/p recent TAVR ; stable  Hx DVT/PE : no undue sx, on a/c, to cont w/ INR monitoring  HTN : been suboptimal, Losartan added.     Discussed w/ RN. OOB, ambulate as tolerated.    Cont routine preventive care.

## 2018-07-24 NOTE — CONSULT NOTE ADULT - ASSESSMENT

## 2018-07-24 NOTE — PROGRESS NOTE ADULT - SUBJECTIVE AND OBJECTIVE BOX
Patient's chart reviewed and patient examined by Maxx George MD (contact:[phone])    SUBJ:    MEDICATIONS  (STANDING):  amLODIPine   Tablet 5 milliGRAM(s) Oral daily  atorvastatin 10 milliGRAM(s) Oral at bedtime  clopidogrel Tablet 75 milliGRAM(s) Oral <User Schedule>  ferrous    sulfate 325 milliGRAM(s) Oral daily  losartan 50 milliGRAM(s) Oral daily  metoprolol tartrate 25 milliGRAM(s) Oral two times a day  pantoprazole    Tablet 40 milliGRAM(s) Oral before breakfast  warfarin 6 milliGRAM(s) Oral once    MEDICATIONS  (PRN):  acetaminophen   Tablet. 650 milliGRAM(s) Oral every 6 hours PRN Moderate Pain (4 - 6)    Vital Signs Last 24 Hrs  T(C): 36.3 (24 Jul 2018 20:01), Max: 36.3 (24 Jul 2018 20:01)  T(F): 97.4 (24 Jul 2018 20:01), Max: 97.4 (24 Jul 2018 20:01)  HR: 68 (24 Jul 2018 20:01) (61 - 112)  BP: 133/81 (24 Jul 2018 20:01) (130/80 - 180/87)  BP(mean): 104 (24 Jul 2018 00:00) (104 - 125)  RR: 18 (24 Jul 2018 20:01) (18 - 18)  SpO2: 99% (24 Jul 2018 09:18) (99% - 99%)     REVIEW OF SYSTEMS:  CONSTITUTIONAL: No fever, chills.  CARDIOLOGY: Denies chest pain, shortness of breath or palpitations.   RESPIRATORY: denies cough, shortness of breath, wheezing.   NEUROLOGICAL: Generalized weakness, no focal deficits to report.  GI: no melena/hematochezia, denies nausea, Vomiting or diarrhea.      PHYSICAL EXAM:  · CONSTITUTIONAL:	Well-developed, well nourished F in no distress    ·RESPIRATORY:   breath sounds equal with good entry;  clear to auscultation bilaterally; no rales, rhonchi or wheeze  · CARDIOVASCULAR	S1 and S2 normal intensity, no rub, 2/6 REMY at base.    ABDOMEN: soft, non-tender, NABS  · EXTREMITIES: No cyanosis, clubbing or edema  NEURO: alert and oriented x 3, no focal deficits.  	  TELEMETRY:  sinus rhythm      LABS:                        11.5   3.30  )-----------( 278      ( 24 Jul 2018 07:00 )             34.6     07-24    142  |  101  |  14  ----------------------------<  98  4.0   |  30  |  0.9    Ca    9.7      24 Jul 2018 07:00  Mg     1.9     07-24    TPro  7.0  /  Alb  4.0  /  TBili  0.8  /  DBili  x   /  AST  26  /  ALT  17  /  AlkPhos  65  07-23    CARDIAC MARKERS ( 23 Jul 2018 08:40 )  x     / <0.01 ng/mL / 88 U/L / x     / 2.9 ng/mL  CARDIAC MARKERS ( 23 Jul 2018 00:49 )  x     / <0.01 ng/mL / 85 U/L / x     / 2.4 ng/mL      PT/INR - ( 24 Jul 2018 16:51 )   PT: 21.40 sec;   INR: 2.00 ratio         PTT - ( 23 Jul 2018 08:40 )  PTT:33.4 sec    I&O's Summary    23 Jul 2018 07:01  -  24 Jul 2018 07:00  --------------------------------------------------------  IN: 1125 mL / OUT: 1050 mL / NET: 75 mL    IMPRESSION AND PLAN:

## 2018-07-24 NOTE — PROGRESS NOTE ADULT - SUBJECTIVE AND OBJECTIVE BOX
SUBJECTIVE:    Patient is a 83y old Female who presents with a chief complaint of dizziness, dyspnea of exertion and chest discomfort for 3 weeks (22 Jul 2018 21:26)    Currently admitted to medicine with the primary diagnosis of SOB (shortness of breath)     Today is hospital day 2d. This morning she is resting comfortably in bed and reports no new issues or overnight events.     PAST MEDICAL & SURGICAL HISTORY  OA (osteoarthritis)  Dyslipidemia  Lupus  Pulmonary embolism  DVT (deep venous thrombosis)  Coronary artery dilation  Hypertension  S/P aortic valve replacement with prosthetic valve  H/O: hysterectomy    SOCIAL HISTORY:  Negative for smoking/alcohol/drug use.     ALLERGIES:  No Known Allergies    MEDICATIONS:  STANDING MEDICATIONS  amLODIPine   Tablet 5 milliGRAM(s) Oral daily  atorvastatin 10 milliGRAM(s) Oral at bedtime  clopidogrel Tablet 75 milliGRAM(s) Oral <User Schedule>  ferrous    sulfate 325 milliGRAM(s) Oral daily  losartan 50 milliGRAM(s) Oral daily  metoprolol tartrate 50 milliGRAM(s) Oral two times a day  pantoprazole    Tablet 40 milliGRAM(s) Oral before breakfast    PRN MEDICATIONS  acetaminophen   Tablet. 650 milliGRAM(s) Oral every 6 hours PRN    VITALS:   T(F): 96.1  HR: 112  BP: 130/80  RR: 17  SpO2: 99%    LABS:                        11.5   3.30  )-----------( 278      ( 24 Jul 2018 07:00 )             34.6     07-24    142  |  101  |  14  ----------------------------<  98  4.0   |  30  |  0.9    Ca    9.7      24 Jul 2018 07:00  Mg     1.9     07-24    TPro  7.0  /  Alb  4.0  /  TBili  0.8  /  DBili  x   /  AST  26  /  ALT  17  /  AlkPhos  65  07-23    PT/INR - ( 23 Jul 2018 08:40 )   PT: 17.40 sec;   INR: 1.62 ratio         PTT - ( 23 Jul 2018 08:40 )  PTT:33.4 sec  Urinalysis Basic - ( 22 Jul 2018 17:00 )    Color: Yellow / Appearance: Clear / SG: <=1.005 / pH: x  Gluc: x / Ketone: Negative  / Bili: Negative / Urobili: 0.2   Blood: x / Protein: Negative / Nitrite: Negative   Leuk Esterase: Small / RBC: 1-2 /HPF / WBC 3-5 /HPF   Sq Epi: x / Non Sq Epi: Many /HPF / Bacteria: Few /HPF            Culture - Urine (collected 22 Jul 2018 17:00)  Source: .Urine Clean Catch (Midstream)  Final Report (23 Jul 2018 21:44):    Culture grew 3 or more types of organisms which indicate    collection contamination; consider recollection only if clinically    indicated.      CARDIAC MARKERS ( 23 Jul 2018 08:40 )  x     / <0.01 ng/mL / 88 U/L / x     / 2.9 ng/mL  CARDIAC MARKERS ( 23 Jul 2018 00:49 )  x     / <0.01 ng/mL / 85 U/L / x     / 2.4 ng/mL  CARDIAC MARKERS ( 22 Jul 2018 16:10 )  x     / <0.01 ng/mL / 90 U/L / x     / 2.2 ng/mL      Troponin T, Serum: <0.01 ng/mL (07-23-18 @ 08:40)  Troponin T, Serum: <0.01 ng/mL (07-23-18 @ 00:49)  Troponin T, Serum: <0.01 ng/mL (07-22-18 @ 16:10)  Serum Pro-Brain Natriuretic Peptide: 645 pg/mL (07-22-18 @ 16:10)      PHYSICAL EXAM:  GENERAL: NAD, well-groomed, well-developed  HEAD:  NCAT  NERVOUS SYSTEM: AAOX4  CHEST/LUNG: CTA b/l no w/r/r  HEART: +s1s2 RRR no m/g/r  ABDOMEN: soft, NT/ND (+) bs, no HSM  EXTREMITIES:  2+ Peripheral Pulses, No c/c/e

## 2018-07-25 ENCOUNTER — TRANSCRIPTION ENCOUNTER (OUTPATIENT)
Age: 83
End: 2018-07-25

## 2018-07-25 LAB
ANION GAP SERPL CALC-SCNC: 12 MMOL/L — SIGNIFICANT CHANGE UP (ref 7–14)
BASOPHILS # BLD AUTO: 0.02 K/UL — SIGNIFICANT CHANGE UP (ref 0–0.2)
BASOPHILS NFR BLD AUTO: 0.6 % — SIGNIFICANT CHANGE UP (ref 0–1)
BUN SERPL-MCNC: 16 MG/DL — SIGNIFICANT CHANGE UP (ref 10–20)
CALCIUM SERPL-MCNC: 9.5 MG/DL — SIGNIFICANT CHANGE UP (ref 8.5–10.1)
CHLORIDE SERPL-SCNC: 103 MMOL/L — SIGNIFICANT CHANGE UP (ref 98–110)
CO2 SERPL-SCNC: 28 MMOL/L — SIGNIFICANT CHANGE UP (ref 17–32)
CREAT SERPL-MCNC: 0.9 MG/DL — SIGNIFICANT CHANGE UP (ref 0.7–1.5)
EOSINOPHIL # BLD AUTO: 0.06 K/UL — SIGNIFICANT CHANGE UP (ref 0–0.7)
EOSINOPHIL NFR BLD AUTO: 1.8 % — SIGNIFICANT CHANGE UP (ref 0–8)
GLUCOSE SERPL-MCNC: 106 MG/DL — HIGH (ref 70–99)
HCT VFR BLD CALC: 35.9 % — LOW (ref 37–47)
HGB BLD-MCNC: 11.9 G/DL — LOW (ref 12–16)
IMM GRANULOCYTES NFR BLD AUTO: 0 % — LOW (ref 0.1–0.3)
INR BLD: 2.16 RATIO — HIGH (ref 0.65–1.3)
INR BLD: 2.5 RATIO — HIGH (ref 0.65–1.3)
LYMPHOCYTES # BLD AUTO: 1.43 K/UL — SIGNIFICANT CHANGE UP (ref 1.2–3.4)
LYMPHOCYTES # BLD AUTO: 42.1 % — SIGNIFICANT CHANGE UP (ref 20.5–51.1)
MAGNESIUM SERPL-MCNC: 1.9 MG/DL — SIGNIFICANT CHANGE UP (ref 1.8–2.4)
MCHC RBC-ENTMCNC: 29 PG — SIGNIFICANT CHANGE UP (ref 27–31)
MCHC RBC-ENTMCNC: 33.1 G/DL — SIGNIFICANT CHANGE UP (ref 32–37)
MCV RBC AUTO: 87.3 FL — SIGNIFICANT CHANGE UP (ref 81–99)
MONOCYTES # BLD AUTO: 0.3 K/UL — SIGNIFICANT CHANGE UP (ref 0.1–0.6)
MONOCYTES NFR BLD AUTO: 8.8 % — SIGNIFICANT CHANGE UP (ref 1.7–9.3)
NEUTROPHILS # BLD AUTO: 1.59 K/UL — SIGNIFICANT CHANGE UP (ref 1.4–6.5)
NEUTROPHILS NFR BLD AUTO: 46.7 % — SIGNIFICANT CHANGE UP (ref 42.2–75.2)
NRBC # BLD: 0 /100 WBCS — SIGNIFICANT CHANGE UP (ref 0–0)
PLATELET # BLD AUTO: 313 K/UL — SIGNIFICANT CHANGE UP (ref 130–400)
POTASSIUM SERPL-MCNC: 3.8 MMOL/L — SIGNIFICANT CHANGE UP (ref 3.5–5)
POTASSIUM SERPL-SCNC: 3.8 MMOL/L — SIGNIFICANT CHANGE UP (ref 3.5–5)
PROTHROM AB SERPL-ACNC: 23.2 SEC — HIGH (ref 9.95–12.87)
PROTHROM AB SERPL-ACNC: 26.8 SEC — HIGH (ref 9.95–12.87)
RBC # BLD: 4.11 M/UL — LOW (ref 4.2–5.4)
RBC # FLD: 15.5 % — HIGH (ref 11.5–14.5)
SODIUM SERPL-SCNC: 143 MMOL/L — SIGNIFICANT CHANGE UP (ref 135–146)
WBC # BLD: 3.4 K/UL — LOW (ref 4.8–10.8)
WBC # FLD AUTO: 3.4 K/UL — LOW (ref 4.8–10.8)

## 2018-07-25 RX ORDER — METOPROLOL TARTRATE 50 MG
25 TABLET ORAL DAILY
Qty: 0 | Refills: 0 | Status: DISCONTINUED | OUTPATIENT
Start: 2018-07-25 | End: 2018-07-27

## 2018-07-25 RX ORDER — METOPROLOL TARTRATE 50 MG
1 TABLET ORAL
Qty: 0 | Refills: 0 | COMMUNITY

## 2018-07-25 RX ORDER — LOSARTAN POTASSIUM 100 MG/1
25 TABLET, FILM COATED ORAL DAILY
Qty: 0 | Refills: 0 | Status: DISCONTINUED | OUTPATIENT
Start: 2018-07-25 | End: 2018-07-25

## 2018-07-25 RX ORDER — LOSARTAN POTASSIUM 100 MG/1
1 TABLET, FILM COATED ORAL
Qty: 30 | Refills: 0
Start: 2018-07-25 | End: 2018-08-23

## 2018-07-25 RX ORDER — WARFARIN SODIUM 2.5 MG/1
1 TABLET ORAL
Qty: 0 | Refills: 0 | COMMUNITY

## 2018-07-25 RX ORDER — LOSARTAN POTASSIUM 100 MG/1
50 TABLET, FILM COATED ORAL DAILY
Qty: 0 | Refills: 0 | Status: DISCONTINUED | OUTPATIENT
Start: 2018-07-25 | End: 2018-07-27

## 2018-07-25 RX ORDER — METOPROLOL TARTRATE 50 MG
1 TABLET ORAL
Qty: 60 | Refills: 0 | OUTPATIENT
Start: 2018-07-25 | End: 2018-08-23

## 2018-07-25 RX ADMIN — Medication 325 MILLIGRAM(S): at 11:37

## 2018-07-25 RX ADMIN — LOSARTAN POTASSIUM 50 MILLIGRAM(S): 100 TABLET, FILM COATED ORAL at 05:46

## 2018-07-25 RX ADMIN — PANTOPRAZOLE SODIUM 40 MILLIGRAM(S): 20 TABLET, DELAYED RELEASE ORAL at 06:29

## 2018-07-25 RX ADMIN — Medication 25 MILLIGRAM(S): at 05:46

## 2018-07-25 RX ADMIN — AMLODIPINE BESYLATE 5 MILLIGRAM(S): 2.5 TABLET ORAL at 05:46

## 2018-07-25 RX ADMIN — ATORVASTATIN CALCIUM 10 MILLIGRAM(S): 80 TABLET, FILM COATED ORAL at 21:37

## 2018-07-25 NOTE — DISCHARGE NOTE ADULT - PLAN OF CARE
manage and prevent recurrence and complications -take all medications as directed  -follow up with Dr. Wilcox in 2 weeks. manage and prevent complications -take all medications as directed  -adhere to low cholesterol diet -take all medications as directed  -monitor blood pressures -take all medications as directed

## 2018-07-25 NOTE — PROGRESS NOTE ADULT - SUBJECTIVE AND OBJECTIVE BOX
GEMMA GOMEZ  83y  Female      SUBJECTIVE:  no chest pain/no dizziness    Progress Note:      REVIEW OF SYSTEMS:    T(C): 36.2 (07-25-18 @ 05:14), Max: 36.3 (07-24-18 @ 20:01)  HR: 84 (07-25-18 @ 05:14) (66 - 84)  BP: 149/85 (07-25-18 @ 05:14) (133/81 - 149/85)  RR: 17 (07-25-18 @ 05:14) (17 - 18)  SpO2: 98% (07-25-18 @ 11:39) (98% - 98%)  Wt(kg): --Vital Signs Last 24 Hrs  T(C): 36.2 (25 Jul 2018 05:14), Max: 36.3 (24 Jul 2018 20:01)  T(F): 97.1 (25 Jul 2018 05:14), Max: 97.4 (24 Jul 2018 20:01)  HR: 84 (25 Jul 2018 05:14) (66 - 84)  BP: 149/85 (25 Jul 2018 05:14) (133/81 - 149/85)  BP(mean): --  RR: 17 (25 Jul 2018 05:14) (17 - 18)  SpO2: 98% (25 Jul 2018 11:39) (98% - 98%)    PHYSICAL EXAM:  lungs -clear  cor reg, nl S1 & S2  ext-no edema,no calf tenderness  neuro-no focal  LABS:                        11.9   3.40  )-----------( 313      ( 25 Jul 2018 07:00 )             35.9   07-25    143  |  103  |  16  ----------------------------<  106<H>  3.8   |  28  |  0.9    Ca    9.5      25 Jul 2018 07:00  Mg     1.9     07-25    TPro  7.0  /  Alb  4.0  /  TBili  0.8  /  DBili  x   /  AST  26  /  ALT  17  /  AlkPhos  65  07-23      RADIOLOGY:      IMPRESSION:  dyspnea resolved  paf-sinus  htn-stable  cad-mi ruled out  s/p tavr        PLAN:  patient cleared by Dr. George  for d/c home   and out patient f/u by cardiologist

## 2018-07-25 NOTE — DISCHARGE NOTE ADULT - MEDICATION SUMMARY - MEDICATIONS TO TAKE
I will START or STAY ON the medications listed below when I get home from the hospital:    losartan 50 mg oral tablet  -- 1 tab(s) by mouth once a day  -- Indication: For HTN    Coumadin 5 mg oral tablet  -- 1 tab(s) by mouth every 48 hours  -- Indication: For AFIB    Lipitor 10 mg oral tablet  -- 1 tab(s) by mouth once a day  -- Indication: For DLD    Plavix 75 mg oral tablet  -- 1 tab(s) by mouth every 48 hours  -- Indication: For AFIB    Metoprolol Tartrate 25 mg oral tablet  -- 1 tab(s) by mouth once a day   -- Indication: For CHF    amLODIPine 5 mg oral tablet  -- 1 tab(s) by mouth once a day  -- Indication: For HTN    ferrous sulfate 325 mg (65 mg elemental iron) oral tablet  -- 1 tab(s) by mouth once a day  -- Indication: For Iron deficiency    omeprazole 20 mg oral delayed release capsule  -- 1 cap(s) by mouth once a day  -- Indication: For GERD

## 2018-07-25 NOTE — DISCHARGE NOTE ADULT - CARE PLAN
Principal Discharge DX:	SOB (shortness of breath)  Goal:	manage and prevent recurrence and complications  Assessment and plan of treatment:	-take all medications as directed  -follow up with Dr. Wilcox in 2 weeks.  Secondary Diagnosis:	Dyslipidemia  Goal:	manage and prevent complications  Assessment and plan of treatment:	-take all medications as directed  -adhere to low cholesterol diet  Secondary Diagnosis:	Hypertension  Goal:	manage and prevent complications  Assessment and plan of treatment:	-take all medications as directed  -monitor blood pressures  Secondary Diagnosis:	Pulmonary embolism  Goal:	manage and prevent recurrence and complications  Assessment and plan of treatment:	-take all medications as directed

## 2018-07-25 NOTE — DISCHARGE NOTE ADULT - MEDICATION SUMMARY - MEDICATIONS TO STOP TAKING
I will STOP taking the medications listed below when I get home from the hospital:    valsartan-hydrochlorothiazide 160mg-12.5mg oral tablet  -- 1 tab(s) by mouth once a day

## 2018-07-25 NOTE — DISCHARGE NOTE ADULT - CARE PROVIDER_API CALL
Sandi Dowell), Internal Medicine  [address] [place] [id number]  Phone: [phone]  Fax: [phone]    Elsi Wilcox), Cardiovascular Disease; Nuclear Cardiology  [address] [facility] [address]  [place] [address]  Phone: [phone]  Fax: [phone]

## 2018-07-25 NOTE — CHART NOTE - NSCHARTNOTEFT_GEN_A_CORE
Called by nurse that patient was getting dressed to go home for her discharge today and became dizzy, lightheaded. Pt denied cp, SOB, palpitations    GEN: NAD, sitting comfortably on side of bed  CV: +s1s2 RRR  RESP: CTA b/l no w/r/r  NEURO: AAOx4. No focal defecits    STAT EKG unchanged from previous: LAFB, VR 63  STAT orthostatics: 139/67 HR 62 laying, 139/79 HR 73 sitting, 128/72 HR 71 standing     A/P  (+) orthostatics from seated to standing  pt recently started on losartan 50mg.   moniter bp   discussed with patient about findings and about possibly having to decrease losartan dose and/or optimize bp meds but pt wants to go home. Discussed risks of leaving in light of these new findings but she states that she "would rather go home." She stated she will wait a few hours and see how she feels then decide.  will f/u Called by nurse that patient was getting dressed to go home for her discharge today and became dizzy, lightheaded. Pt denied cp, SOB, palpitations    GEN: NAD, sitting comfortably on side of bed  CV: +s1s2 RRR  RESP: CTA b/l no w/r/r  NEURO: AAOx4. No focal defecits    STAT EKG unchanged from previous: LAFB, VR 63  STAT orthostatics: 139/67 HR 62 laying, 139/79 HR 73 sitting, 128/72 HR 71 standing     A/P  (+) orthostatics from seated to standing  pt recently started on losartan 50mg.   moniter bp   discussed with patient about findings and about possibly having to decrease losartan dose and/or optimize bp meds but pt wants to go home. Discussed risks of leaving in light of these new findings but she states that she "would rather go home." She stated she will wait a few hours and see how she feels then decide.  will f/u      UPDATE:  pt consistently dizzy, orthostatic. Will cancel d/c for today Called by nurse that patient was getting dressed to go home for her discharge today and became dizzy, lightheaded. Pt denied cp, SOB, palpitations    GEN: NAD, sitting comfortably on side of bed  CV: +s1s2 RRR  RESP: CTA b/l no w/r/r  NEURO: AAOx4. No focal defecits    STAT EKG unchanged from previous: LAFB, VR 63  STAT orthostatics: 139/67 HR 62 laying, 139/79 HR 73 sitting, 128/72 HR 71 standing     A/P  (+) orthostatics from seated to standing  pt recently started on losartan 50mg, metoprolol 50mg BID which was changed to 25mg BID in light of bradycardia.   moniter bp   discussed with patient about findings and about possibly having to decrease metoprolol dose and/or optimize bp meds but pt wants to go home. Discussed risks of leaving in light of these new findings but she states that she "would rather go home." She stated she will wait a few hours and see how she feels then decide.  will f/u      UPDATE:  pt consistently dizzy, orthostatic. Will cancel d/c for today  will decrease metoprolol to 25mg daily and monitor HR and sx Called by nurse that patient was getting dressed to go home for her discharge today and became dizzy, lightheaded. Pt denied cp, SOB, palpitations    GEN: NAD, sitting comfortably on side of bed  CV: +s1s2 RRR  RESP: CTA b/l no w/r/r  NEURO: AAOx4. No focal defecits    STAT EKG unchanged from previous: LAFB, VR 63  STAT orthostatics: 139/67 HR 62 laying, 139/79 HR 73 sitting, 128/72 HR 71 standing     A/P  (+) orthostatics from seated to standing  pt recently started on losartan 50mg, metoprolol 50mg BID which was changed to 25mg BID in light of bradycardia.   moniter bp   discussed with patient about findings and about possibly having to decrease metoprolol dose and/or optimize bp meds but pt wants to go home. Discussed risks of leaving in light of these new findings but she states that she "would rather go home." She stated she will wait a few hours and see how she feels then decide.  will f/u      UPDATE:  pt consistently dizzy, orthostatic. Will cancel d/c for today  will decrease metoprolol to 25mg daily and monitor HR and sx  f/u cardiology

## 2018-07-25 NOTE — DISCHARGE NOTE ADULT - PATIENT PORTAL LINK FT
You can access the [facility] Patient Portal, offered by [facility], by registering with the following website: [email or web address]

## 2018-07-25 NOTE — DISCHARGE NOTE ADULT - HOSPITAL COURSE
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 As per pt she was in her usual state of health when she started having dizziness ,while getting out of bed ,but can occur at rest ,also c/o exertional dyspnea and pressure like chest discomfort with exertion. Pt is not a good historian and was not able to explain clearly. No orthopnea, leg edema ,cough or PND, no fever, chills ,vomiting or abd pain . Also c/o nausea for last 3 days ,and decrease PO intake .she took couple of Aleve in last week too for headache too. In ER ,  EKG showed sinus elena cardia with no ST segment elevation or T wave inversion. Pt was bradycardiac, first set of cardiac enzymes negative and BNP of 645. She was taken off metoprolol and then became tachycardic. Metoprolol dose was reduced and HR is now in desired range. ECHO was normal and unchaged if not better than previous.

## 2018-07-26 LAB
APTT BLD: 37.4 SEC — SIGNIFICANT CHANGE UP (ref 27–39.2)
INR BLD: 2.44 RATIO — HIGH (ref 0.65–1.3)
PROTHROM AB SERPL-ACNC: 26.1 SEC — HIGH (ref 9.95–12.87)

## 2018-07-26 PROCEDURE — 93880 EXTRACRANIAL BILAT STUDY: CPT | Mod: 26

## 2018-07-26 RX ORDER — WARFARIN SODIUM 2.5 MG/1
6 TABLET ORAL ONCE
Qty: 0 | Refills: 0 | Status: COMPLETED | OUTPATIENT
Start: 2018-07-26 | End: 2018-07-26

## 2018-07-26 RX ADMIN — Medication 325 MILLIGRAM(S): at 11:08

## 2018-07-26 RX ADMIN — ATORVASTATIN CALCIUM 10 MILLIGRAM(S): 80 TABLET, FILM COATED ORAL at 21:16

## 2018-07-26 RX ADMIN — CLOPIDOGREL BISULFATE 75 MILLIGRAM(S): 75 TABLET, FILM COATED ORAL at 05:03

## 2018-07-26 RX ADMIN — LOSARTAN POTASSIUM 50 MILLIGRAM(S): 100 TABLET, FILM COATED ORAL at 05:03

## 2018-07-26 RX ADMIN — PANTOPRAZOLE SODIUM 40 MILLIGRAM(S): 20 TABLET, DELAYED RELEASE ORAL at 09:11

## 2018-07-26 RX ADMIN — Medication 650 MILLIGRAM(S): at 05:02

## 2018-07-26 RX ADMIN — AMLODIPINE BESYLATE 5 MILLIGRAM(S): 2.5 TABLET ORAL at 05:03

## 2018-07-26 RX ADMIN — Medication 650 MILLIGRAM(S): at 05:03

## 2018-07-26 RX ADMIN — WARFARIN SODIUM 6 MILLIGRAM(S): 2.5 TABLET ORAL at 05:02

## 2018-07-26 RX ADMIN — Medication 25 MILLIGRAM(S): at 05:03

## 2018-07-26 NOTE — PROGRESS NOTE ADULT - SUBJECTIVE AND OBJECTIVE BOX
07-26-18 @ 12:46    GEMMA GOMEZ  83y  Female      Noted sx of dizziness yesterday.     INTERVAL EVENTS:    MEDICATIONS  (STANDING):  amLODIPine   Tablet 5 milliGRAM(s) Oral daily  atorvastatin 10 milliGRAM(s) Oral at bedtime  clopidogrel Tablet 75 milliGRAM(s) Oral <User Schedule>  ferrous    sulfate 325 milliGRAM(s) Oral daily  losartan 50 milliGRAM(s) Oral daily  metoprolol tartrate 25 milliGRAM(s) Oral daily  pantoprazole    Tablet 40 milliGRAM(s) Oral before breakfast    MEDICATIONS  (PRN):  acetaminophen   Tablet. 650 milliGRAM(s) Oral every 6 hours PRN Moderate Pain (4 - 6)      T(C): 36.1 (07-26-18 @ 06:11), Max: 36.1 (07-26-18 @ 06:11)  HR: 63 (07-26-18 @ 09:39) (57 - 78)  BP: 135/67 (07-26-18 @ 09:39) (129/81 - 174/88)  RR: 19 (07-26-18 @ 09:39) (17 - 19)  SpO2: 100% (07-26-18 @ 09:39) (97% - 100%)  Wt(kg): --Vital Signs Last 24 Hrs  T(C): 36.1 (26 Jul 2018 06:11), Max: 36.1 (26 Jul 2018 06:11)  T(F): 97 (26 Jul 2018 06:11), Max: 97 (26 Jul 2018 06:11)  HR: 63 (26 Jul 2018 09:39) (57 - 78)  BP: 135/67 (26 Jul 2018 09:39) (129/81 - 174/88)  BP(mean): --  RR: 19 (26 Jul 2018 09:39) (17 - 19)  SpO2: 100% (26 Jul 2018 09:39) (97% - 100%)    PHYSICAL EXAM:  GENERAL:   NECK:   CHEST/LUNG:  HEART: S1  ABDOMEN:   EXTREMITIES:                           11.9   3.40  )-----------( 313      ( 25 Jul 2018 07:00 )             35.9     07-25    143  |  103  |  16  ----------------------------<  106<H>  3.8   |  28  |  0.9    Ca    9.5      25 Jul 2018 07:00  Mg     1.9     07-25        PT/INR - ( 26 Jul 2018 07:28 )   PT: 26.10 sec;   INR: 2.44 ratio         PTT - ( 26 Jul 2018 07:28 )  PTT:37.4 sec      RADIOLOGY & ADDITIONAL TESTS:      ASSESSMENT / PLAN  :    HEALTH ISSUES - PROBLEM Dx: 07-26-18 @ 12:46    GEMMA GOMEZ  83y  Female  Seen with son Janes at bedside.       Noted sx of dizziness yesterday. Currently no c/o, but doesn't feel so good. Of note, she has been mostly bedridden being in the F9 unit, where free ambulation was not allowed.     INTERVAL EVENTS: No new events since yesterday    MEDICATIONS  (STANDING):  amLODIPine   Tablet 5 milliGRAM(s) Oral daily  atorvastatin 10 milliGRAM(s) Oral at bedtime  clopidogrel Tablet 75 milliGRAM(s) Oral <User Schedule>  ferrous    sulfate 325 milliGRAM(s) Oral daily  losartan 50 milliGRAM(s) Oral daily  metoprolol tartrate 25 milliGRAM(s) Oral daily  pantoprazole    Tablet 40 milliGRAM(s) Oral before breakfast    MEDICATIONS  (PRN):  acetaminophen   Tablet. 650 milliGRAM(s) Oral every 6 hours PRN Moderate Pain (4 - 6)      T(C): 36.1 (07-26-18 @ 06:11), Max: 36.1 (07-26-18 @ 06:11)  HR: 63 (07-26-18 @ 09:39) (57 - 78)  BP: 135/67 (07-26-18 @ 09:39) (129/81 - 174/88)  RR: 19 (07-26-18 @ 09:39) (17 - 19)  SpO2: 100% (07-26-18 @ 09:39) (97% - 100%)  Wt(kg): --Vital Signs Last 24 Hrs  T(C): 36.1 (26 Jul 2018 06:11), Max: 36.1 (26 Jul 2018 06:11)  T(F): 97 (26 Jul 2018 06:11), Max: 97 (26 Jul 2018 06:11)  HR: 63 (26 Jul 2018 09:39) (57 - 78)  BP: 135/67 (26 Jul 2018 09:39) (129/81 - 174/88)  BP(mean): --  RR: 19 (26 Jul 2018 09:39) (17 - 19)  SpO2: 100% (26 Jul 2018 09:39) (97% - 100%)    PHYSICAL EXAM:  GENERAL: NAD  NECK: supple, w/o bruit  CHEST/LUNG: Clear to a/p  HEART: S1 S2, SM LSE  ABDOMEN: benign  EXTREMITIES: no CCE                          11.9   3.40  )-----------( 313      ( 25 Jul 2018 07:00 )             35.9     07-25    143  |  103  |  16  ----------------------------<  106<H>  3.8   |  28  |  0.9    Ca    9.5      25 Jul 2018 07:00  Mg     1.9     07-25  	  Thyroid Stimulating Hormone, Serum: 1.80 uIU/mL (07.23.18 @ 08:40)        PT/INR - ( 26 Jul 2018 07:28 )   PT: 26.10 sec;   INR: 2.44 ratio         PTT - ( 26 Jul 2018 07:28 )  PTT:37.4 sec      RADIOLOGY & ADDITIONAL TESTS:  Car doppler :  Impression:    Less than 50% stenosis of the right internal carotid artery.  Less than 50% stenosis of the left internal carotid artery.    ICD-10: R42      WENDI DOYLE M.D., ATTENDING VASCULAR  This document has been electronically signed. Jul 26 2018  4:43PM      ASSESSMENT / PLAN  :    SOB : No acute cardio pulm issue suspected. Seen by Cardio. No further w/u suggested. Possible deconditioning  Hx A fib : Rate controlled. STable.   Hx DVT/PE : Chr a/c w/ coumadin. INR th. now  HTN : stable. Cont tx. Avoid hypotension  Dizziness : trnsient. Likely vasovagal. Needs PT, ambulation more often.     Clinically stable presently. Encourage to ambulate, disch plan if cont stable.

## 2018-07-26 NOTE — PROGRESS NOTE ADULT - ASSESSMENT
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    Exertional dyspnea/Chest discomfort in known CAD patient;  r/o ACS; Likely UA   -first set of CE negative x3 ,high BNP  -last cardiac cath in 7/14 shows non obstructive with patent stents in RCA and LAD   -2D echo(2015:nl EF ,G1DD and AS): LVEF 60 to 65%. Normal global left ventricular systolic function. Moderate concentric left ventricular hypertrophy. Moderate to severe mitral annular calcification. Thickening and calcification of the anterior and posterior mitral   valve leaflets. TAVR. No perivalvular regurgitation.  -may be discharged as per cardio and follow up with Dr. Wilcox in 2 weeks as per cardio      Dizziness /nausea;  -sinus elena on EKG  -c/w tele monitoring and hold metoprolol. Resumed as HR was 126-120. If bradycardic can hold and resume if HR >70.  -pt was supposed to be d/c yesterday but became dizzy when getting dressed. (+) orthostatics, metoprolol dec to daily.  -f/u carotid duplex, thyroid panel    CAD/TVAR/HTN/DLD;  -c/w plavix, statin and amlodipine, losartan  -d/c HCTZ, valsartan as per cardio    DVT/PE on coumadin  -c/w coumadin 6mg  -moniter INR    CONOR   -last s.cr 0.92 in 8/17  -hx of naproxen and decreases PO intake  -likely pre renal  -nephrotoxic meds  - Intake and output monitoring    DVT ppx  -on coumadin    GI ppx;  -c/w  protonix    Diet;  DASH and renal    Dispo  -home  -PT/rehab: home VNS    FULL CODE

## 2018-07-26 NOTE — PROGRESS NOTE ADULT - SUBJECTIVE AND OBJECTIVE BOX
SUBJECTIVE:    Patient is a 83y old Female who presents with a chief complaint of dizziness, dyspnea of exertion and chest discomfort for 3 weeks (25 Jul 2018 07:53)    Currently admitted to medicine with the primary diagnosis of SOB (shortness of breath)     Today is hospital day 4d. This morning she is resting comfortably in bed and reports no new issues or overnight events.     PAST MEDICAL & SURGICAL HISTORY  OA (osteoarthritis)  Dyslipidemia  Lupus  Pulmonary embolism  DVT (deep venous thrombosis)  Coronary artery dilation  Hypertension  S/P aortic valve replacement with prosthetic valve  H/O: hysterectomy    SOCIAL HISTORY:  Negative for smoking/alcohol/drug use.     ALLERGIES:  No Known Allergies    MEDICATIONS:  STANDING MEDICATIONS  amLODIPine   Tablet 5 milliGRAM(s) Oral daily  atorvastatin 10 milliGRAM(s) Oral at bedtime  clopidogrel Tablet 75 milliGRAM(s) Oral <User Schedule>  ferrous    sulfate 325 milliGRAM(s) Oral daily  losartan 50 milliGRAM(s) Oral daily  metoprolol tartrate 25 milliGRAM(s) Oral daily  pantoprazole    Tablet 40 milliGRAM(s) Oral before breakfast    PRN MEDICATIONS  acetaminophen   Tablet. 650 milliGRAM(s) Oral every 6 hours PRN    VITALS:   T(F): 97  HR: 63  BP: 135/67  RR: 19  SpO2: 100%    LABS:                        11.9   3.40  )-----------( 313      ( 25 Jul 2018 07:00 )             35.9     07-25    143  |  103  |  16  ----------------------------<  106<H>  3.8   |  28  |  0.9    Ca    9.5      25 Jul 2018 07:00  Mg     1.9     07-25      PT/INR - ( 26 Jul 2018 07:28 )   PT: 26.10 sec;   INR: 2.44 ratio         PTT - ( 26 Jul 2018 07:28 )  PTT:37.4 sec      PHYSICAL EXAM:  GENERAL: NAD, well-groomed, well-developed  HEAD:  NCAT  EYES: EOMI, PERRLA, conjunctiva and sclera clear  NERVOUS SYSTEM: AAOX4, Good concentration  CHEST/LUNG: CTA b/l no w/r/r  HEART: +s1s2 RRR no m/g/r  ABDOMEN: soft, NT/ND (+) bs, no HSM  EXTREMITIES:  2+ Peripheral Pulses, No c/c/e

## 2018-07-27 VITALS — TEMPERATURE: 97 F | HEART RATE: 67 BPM | SYSTOLIC BLOOD PRESSURE: 158 MMHG | DIASTOLIC BLOOD PRESSURE: 72 MMHG

## 2018-07-27 LAB
T3 SERPL-MCNC: 107 NG/DL — SIGNIFICANT CHANGE UP (ref 80–200)
T4 AB SER-ACNC: 7.4 UG/DL — SIGNIFICANT CHANGE UP (ref 4.6–12)
TSH SERPL-MCNC: 2.83 UIU/ML — SIGNIFICANT CHANGE UP (ref 0.27–4.2)

## 2018-07-27 RX ORDER — METOPROLOL TARTRATE 50 MG
1 TABLET ORAL
Qty: 30 | Refills: 0
Start: 2018-07-27 | End: 2018-08-25

## 2018-07-27 RX ADMIN — PANTOPRAZOLE SODIUM 40 MILLIGRAM(S): 20 TABLET, DELAYED RELEASE ORAL at 05:33

## 2018-07-27 RX ADMIN — Medication 325 MILLIGRAM(S): at 11:46

## 2018-07-27 RX ADMIN — Medication 25 MILLIGRAM(S): at 05:33

## 2018-07-27 RX ADMIN — AMLODIPINE BESYLATE 5 MILLIGRAM(S): 2.5 TABLET ORAL at 05:33

## 2018-07-27 RX ADMIN — LOSARTAN POTASSIUM 50 MILLIGRAM(S): 100 TABLET, FILM COATED ORAL at 05:33

## 2018-07-28 LAB — METANEPH UR-MCNC: SIGNIFICANT CHANGE UP

## 2019-01-10 ENCOUNTER — APPOINTMENT (OUTPATIENT)
Dept: VASCULAR SURGERY | Facility: CLINIC | Age: 84
End: 2019-01-10

## 2019-04-02 ENCOUNTER — EMERGENCY (EMERGENCY)
Facility: HOSPITAL | Age: 84
LOS: 2 days | Discharge: HOME | End: 2019-04-05
Attending: EMERGENCY MEDICINE | Admitting: EMERGENCY MEDICINE
Payer: MEDICARE

## 2019-04-02 VITALS
TEMPERATURE: 97 F | RESPIRATION RATE: 18 BRPM | HEART RATE: 91 BPM | DIASTOLIC BLOOD PRESSURE: 80 MMHG | OXYGEN SATURATION: 100 % | SYSTOLIC BLOOD PRESSURE: 152 MMHG

## 2019-04-02 DIAGNOSIS — I10 ESSENTIAL (PRIMARY) HYPERTENSION: ICD-10-CM

## 2019-04-02 DIAGNOSIS — E78.5 HYPERLIPIDEMIA, UNSPECIFIED: ICD-10-CM

## 2019-04-02 DIAGNOSIS — R42 DIZZINESS AND GIDDINESS: ICD-10-CM

## 2019-04-02 DIAGNOSIS — Z95.5 PRESENCE OF CORONARY ANGIOPLASTY IMPLANT AND GRAFT: ICD-10-CM

## 2019-04-02 DIAGNOSIS — I25.10 ATHEROSCLEROTIC HEART DISEASE OF NATIVE CORONARY ARTERY WITHOUT ANGINA PECTORIS: ICD-10-CM

## 2019-04-02 DIAGNOSIS — Z87.891 PERSONAL HISTORY OF NICOTINE DEPENDENCE: ICD-10-CM

## 2019-04-02 DIAGNOSIS — Z79.01 LONG TERM (CURRENT) USE OF ANTICOAGULANTS: ICD-10-CM

## 2019-04-02 DIAGNOSIS — Z98.890 OTHER SPECIFIED POSTPROCEDURAL STATES: Chronic | ICD-10-CM

## 2019-04-02 DIAGNOSIS — Z95.2 PRESENCE OF PROSTHETIC HEART VALVE: Chronic | ICD-10-CM

## 2019-04-02 DIAGNOSIS — E11.9 TYPE 2 DIABETES MELLITUS WITHOUT COMPLICATIONS: ICD-10-CM

## 2019-04-02 DIAGNOSIS — R51 HEADACHE: ICD-10-CM

## 2019-04-02 DIAGNOSIS — Z86.718 PERSONAL HISTORY OF OTHER VENOUS THROMBOSIS AND EMBOLISM: ICD-10-CM

## 2019-04-02 DIAGNOSIS — R07.9 CHEST PAIN, UNSPECIFIED: ICD-10-CM

## 2019-04-02 DIAGNOSIS — R11.0 NAUSEA: ICD-10-CM

## 2019-04-02 DIAGNOSIS — Z86.711 PERSONAL HISTORY OF PULMONARY EMBOLISM: ICD-10-CM

## 2019-04-02 PROBLEM — L93.0 DISCOID LUPUS ERYTHEMATOSUS: Chronic | Status: ACTIVE | Noted: 2018-07-22

## 2019-04-02 PROBLEM — I82.409 ACUTE EMBOLISM AND THROMBOSIS OF UNSPECIFIED DEEP VEINS OF UNSPECIFIED LOWER EXTREMITY: Chronic | Status: ACTIVE | Noted: 2018-07-22

## 2019-04-02 PROBLEM — M19.90 UNSPECIFIED OSTEOARTHRITIS, UNSPECIFIED SITE: Chronic | Status: ACTIVE | Noted: 2018-07-22

## 2019-04-02 PROBLEM — I26.99 OTHER PULMONARY EMBOLISM WITHOUT ACUTE COR PULMONALE: Chronic | Status: ACTIVE | Noted: 2018-07-22

## 2019-04-02 PROBLEM — I25.41 CORONARY ARTERY ANEURYSM: Chronic | Status: ACTIVE | Noted: 2018-07-22

## 2019-04-02 LAB
ALBUMIN SERPL ELPH-MCNC: 4.3 G/DL — SIGNIFICANT CHANGE UP (ref 3.5–5.2)
ALP SERPL-CCNC: 46 U/L — SIGNIFICANT CHANGE UP (ref 30–115)
ALT FLD-CCNC: 19 U/L — SIGNIFICANT CHANGE UP (ref 0–41)
ANION GAP SERPL CALC-SCNC: 11 MMOL/L — SIGNIFICANT CHANGE UP (ref 7–14)
ANION GAP SERPL CALC-SCNC: 13 MMOL/L — SIGNIFICANT CHANGE UP (ref 7–14)
APTT BLD: 39.7 SEC — HIGH (ref 27–39.2)
AST SERPL-CCNC: 61 U/L — HIGH (ref 0–41)
BASOPHILS # BLD AUTO: 0.03 K/UL — SIGNIFICANT CHANGE UP (ref 0–0.2)
BASOPHILS NFR BLD AUTO: 0.8 % — SIGNIFICANT CHANGE UP (ref 0–1)
BILIRUB SERPL-MCNC: 0.4 MG/DL — SIGNIFICANT CHANGE UP (ref 0.2–1.2)
BUN SERPL-MCNC: 16 MG/DL — SIGNIFICANT CHANGE UP (ref 10–20)
BUN SERPL-MCNC: 18 MG/DL — SIGNIFICANT CHANGE UP (ref 10–20)
CALCIUM SERPL-MCNC: 9.2 MG/DL — SIGNIFICANT CHANGE UP (ref 8.5–10.1)
CALCIUM SERPL-MCNC: 9.3 MG/DL — SIGNIFICANT CHANGE UP (ref 8.5–10.1)
CHLORIDE SERPL-SCNC: 103 MMOL/L — SIGNIFICANT CHANGE UP (ref 98–110)
CHLORIDE SERPL-SCNC: 97 MMOL/L — LOW (ref 98–110)
CO2 SERPL-SCNC: 26 MMOL/L — SIGNIFICANT CHANGE UP (ref 17–32)
CO2 SERPL-SCNC: 26 MMOL/L — SIGNIFICANT CHANGE UP (ref 17–32)
CREAT SERPL-MCNC: 1.2 MG/DL — SIGNIFICANT CHANGE UP (ref 0.7–1.5)
CREAT SERPL-MCNC: 1.6 MG/DL — HIGH (ref 0.7–1.5)
EOSINOPHIL # BLD AUTO: 0.01 K/UL — SIGNIFICANT CHANGE UP (ref 0–0.7)
EOSINOPHIL NFR BLD AUTO: 0.3 % — SIGNIFICANT CHANGE UP (ref 0–8)
GLUCOSE SERPL-MCNC: 106 MG/DL — HIGH (ref 70–99)
GLUCOSE SERPL-MCNC: 131 MG/DL — HIGH (ref 70–99)
HCT VFR BLD CALC: 38.5 % — SIGNIFICANT CHANGE UP (ref 37–47)
HGB BLD-MCNC: 12.5 G/DL — SIGNIFICANT CHANGE UP (ref 12–16)
IMM GRANULOCYTES NFR BLD AUTO: 0.3 % — SIGNIFICANT CHANGE UP (ref 0.1–0.3)
INR BLD: 3.6 RATIO — HIGH (ref 0.65–1.3)
LYMPHOCYTES # BLD AUTO: 1.1 K/UL — LOW (ref 1.2–3.4)
LYMPHOCYTES # BLD AUTO: 28.7 % — SIGNIFICANT CHANGE UP (ref 20.5–51.1)
MCHC RBC-ENTMCNC: 29.6 PG — SIGNIFICANT CHANGE UP (ref 27–31)
MCHC RBC-ENTMCNC: 32.5 G/DL — SIGNIFICANT CHANGE UP (ref 32–37)
MCV RBC AUTO: 91.2 FL — SIGNIFICANT CHANGE UP (ref 81–99)
MONOCYTES # BLD AUTO: 0.28 K/UL — SIGNIFICANT CHANGE UP (ref 0.1–0.6)
MONOCYTES NFR BLD AUTO: 7.3 % — SIGNIFICANT CHANGE UP (ref 1.7–9.3)
NEUTROPHILS # BLD AUTO: 2.4 K/UL — SIGNIFICANT CHANGE UP (ref 1.4–6.5)
NEUTROPHILS NFR BLD AUTO: 62.6 % — SIGNIFICANT CHANGE UP (ref 42.2–75.2)
NRBC # BLD: 0 /100 WBCS — SIGNIFICANT CHANGE UP (ref 0–0)
PLATELET # BLD AUTO: 309 K/UL — SIGNIFICANT CHANGE UP (ref 130–400)
POTASSIUM SERPL-MCNC: 4 MMOL/L — SIGNIFICANT CHANGE UP (ref 3.5–5)
POTASSIUM SERPL-MCNC: 7.3 MMOL/L — CRITICAL HIGH (ref 3.5–5)
POTASSIUM SERPL-SCNC: 4 MMOL/L — SIGNIFICANT CHANGE UP (ref 3.5–5)
POTASSIUM SERPL-SCNC: 7.3 MMOL/L — CRITICAL HIGH (ref 3.5–5)
PROT SERPL-MCNC: 7.8 G/DL — SIGNIFICANT CHANGE UP (ref 6–8)
PROTHROM AB SERPL-ACNC: >40 SEC — HIGH (ref 9.95–12.87)
RBC # BLD: 4.22 M/UL — SIGNIFICANT CHANGE UP (ref 4.2–5.4)
RBC # FLD: 14.7 % — HIGH (ref 11.5–14.5)
SODIUM SERPL-SCNC: 134 MMOL/L — LOW (ref 135–146)
SODIUM SERPL-SCNC: 142 MMOL/L — SIGNIFICANT CHANGE UP (ref 135–146)
TROPONIN T SERPL-MCNC: <0.01 NG/ML — SIGNIFICANT CHANGE UP
TROPONIN T SERPL-MCNC: <0.01 NG/ML — SIGNIFICANT CHANGE UP
WBC # BLD: 3.83 K/UL — LOW (ref 4.8–10.8)
WBC # FLD AUTO: 3.83 K/UL — LOW (ref 4.8–10.8)

## 2019-04-02 PROCEDURE — 70450 CT HEAD/BRAIN W/O DYE: CPT | Mod: 26

## 2019-04-02 PROCEDURE — 71275 CT ANGIOGRAPHY CHEST: CPT | Mod: 26

## 2019-04-02 PROCEDURE — 93010 ELECTROCARDIOGRAM REPORT: CPT

## 2019-04-02 PROCEDURE — 71046 X-RAY EXAM CHEST 2 VIEWS: CPT | Mod: 26

## 2019-04-02 PROCEDURE — 99220: CPT

## 2019-04-02 RX ORDER — ATORVASTATIN CALCIUM 80 MG/1
80 TABLET, FILM COATED ORAL ONCE
Qty: 0 | Refills: 0 | Status: COMPLETED | OUTPATIENT
Start: 2019-04-02 | End: 2019-04-02

## 2019-04-02 RX ORDER — SODIUM CHLORIDE 9 MG/ML
500 INJECTION, SOLUTION INTRAVENOUS ONCE
Qty: 0 | Refills: 0 | Status: COMPLETED | OUTPATIENT
Start: 2019-04-02 | End: 2019-04-02

## 2019-04-02 RX ORDER — METOCLOPRAMIDE HCL 10 MG
10 TABLET ORAL ONCE
Qty: 0 | Refills: 0 | Status: COMPLETED | OUTPATIENT
Start: 2019-04-02 | End: 2019-04-02

## 2019-04-02 RX ORDER — MECLIZINE HCL 12.5 MG
12.5 TABLET ORAL ONCE
Qty: 0 | Refills: 0 | Status: COMPLETED | OUTPATIENT
Start: 2019-04-02 | End: 2019-04-02

## 2019-04-02 RX ADMIN — ATORVASTATIN CALCIUM 80 MILLIGRAM(S): 80 TABLET, FILM COATED ORAL at 22:59

## 2019-04-02 RX ADMIN — SODIUM CHLORIDE 1000 MILLILITER(S): 9 INJECTION, SOLUTION INTRAVENOUS at 16:07

## 2019-04-02 RX ADMIN — Medication 10 MILLIGRAM(S): at 17:00

## 2019-04-02 RX ADMIN — Medication 12.5 MILLIGRAM(S): at 15:28

## 2019-04-02 NOTE — ED ADULT TRIAGE NOTE - CHIEF COMPLAINT QUOTE
headache, dizziness and nausea going on for a while but worsened today and tightness of chest took nitro spray prior to arrival as per patient

## 2019-04-02 NOTE — ED PROCEDURE NOTE - ATTENDING CONTRIBUTION TO CARE
. I was present for and supervised the key critical aspects of the procedures performed during the care of the patient.  this ends my involvement in patients care. pt with hematoma after try. warm compress applied, nurse and clinical team aware

## 2019-04-02 NOTE — ED PROVIDER NOTE - ATTENDING CONTRIBUTION TO CARE
85yo woman h/o lupus, HTN, DLD, DVT with PE, TAVR, CAD s/p stent x 2 c/o dizziness associated with posterior headache/neck pain x 2 days, with nausea and gagging. Also c/o chest discomfort, diffuse/pressurelike, did not improved with NTG. On exam VS as noted, pt ill appearing, neck supple, lungs CTA, CVS1S2 RRR abd soft, neuro grossly intact but v symptomatic with movement of the head. Labs, head CT, EKG, reassess.

## 2019-04-02 NOTE — ED ADULT NURSE NOTE - NSIMPLEMENTINTERV_GEN_ALL_ED
Implemented All Fall with Harm Risk Interventions:  [place] to call system. Call bell, personal items and telephone within reach. Instruct patient to call for assistance. Room bathroom lighting operational. Non-slip footwear when patient is off stretcher. Physically safe environment: no spills, clutter or unnecessary equipment. Stretcher in lowest position, wheels locked, appropriate side rails in place. Provide visual cue, wrist band, yellow gown, etc. Monitor gait and stability. Monitor for mental status changes and reorient to person, place, and time. Review medications for side effects contributing to fall risk. Reinforce activity limits and safety measures with patient and family. Provide visual clues: red socks.

## 2019-04-02 NOTE — ED CDU PROVIDER INITIAL DAY NOTE - OBJECTIVE STATEMENT
83y/o female with pmh of cad with 6 stents, dm, htn, hld, pe/dvt on coumadin, pt. c/o dizziness x 4 days which she describes as "tightness to head ", + nausea, + cp. denies sob, fever, cough, focal weakness, slurred speech. cardiologist dr. Wilcox. pt. was placed in obs for tia workup.

## 2019-04-02 NOTE — ED PROVIDER NOTE - PHYSICAL EXAMINATION
Physical Exam    Vital Signs: I have reviewed the initial vital signs.  Constitutional: well-nourished, appears stated age, no acute distress  Eyes: PERRLA,  and symmetrical lids.  ENT: Neck supple with no adenopathy, moist MM.  Cardiovascular: regular rate, regular rhythm, well-perfused extremities  Respiratory: unlabored respiratory effort, clear to auscultation bilaterally  Gastrointestinal: soft, non-tender abdomen, no pulsatile mass  Musculoskeletal: supple neck, no lower extremity edema  Integumentary: warm, dry, no rash  Neurologic: awake, alert, cranial nerves II-XII grossly intact, extremities’ motor and sensory functions grossly intact, no ataxia, no dysmetria   Psychiatric: A&Ox3, appropriate mood, appropriate affect

## 2019-04-02 NOTE — ED CDU PROVIDER INITIAL DAY NOTE - ATTENDING CONTRIBUTION TO CARE
Seen with PA agree with above, lungs- clear, abdomen- soft no tendernes to any region, neuro- non focal, s1s2 no gallops or murmurs, full workup in progress will continue to re-evaluate

## 2019-04-02 NOTE — ED PROVIDER NOTE - CLINICAL SUMMARY MEDICAL DECISION MAKING FREE TEXT BOX
Pt feeling better on reevaluation, but still with some dizziness. Given extensive medical history, I am not comfortable with d/c home, will place in CDU for neuro eval, repeat EKG and enzymes, possible cards eval (Dr Wilcox).

## 2019-04-02 NOTE — ED PROVIDER NOTE - OBJECTIVE STATEMENT
83 yo f pmhx DM, HTN, HLD, CAD stent x2, PEx3 (on coumadin) pw dizziness and headache for 2 d in duration that is quick onset associated with n/v and gaging made worse by laying down and improves with sitting up, however symptoms are present in any position and are constant and ongoing. Additionally pt is reporting mod mid sternal CP non radiating with no modifying factors associated with SOB for 1 d in duration. Denies diaphoresis, LOC, and head traum.    I have reviewed available current nursing and previous documentation of past medical, surgical, family, and/or social history.

## 2019-04-02 NOTE — ED PROVIDER NOTE - NS ED ROS FT
Review of Systems    Constitutional: (-) fever  Eyes/ENT: (-) change in vision, (-) sore throat, (-) ear pain  Cardiovascular: (-) palpitation   Respiratory: (-) cough, (-) shortness of breath  Gastrointestinal: (-) abdominal pain (-) vomiting, (-) diarrhea  Musculoskeletal: (-) neck pain, (-) back pain, (-) joint pain  Integumentary: (-) rash, (-) edema  Neurological: (-) headache, (-) altered mental status  Heme/Lymph: (-) easy bruising (-) easy bleeding   Allergic/Immunologic: (-) pruritus

## 2019-04-03 LAB
APTT BLD: 43.3 SEC — HIGH (ref 27–39.2)
INR BLD: 3.57 RATIO — HIGH (ref 0.65–1.3)
PROTHROM AB SERPL-ACNC: >40 SEC — HIGH (ref 9.95–12.87)

## 2019-04-03 PROCEDURE — 70544 MR ANGIOGRAPHY HEAD W/O DYE: CPT | Mod: 26,59

## 2019-04-03 PROCEDURE — 70548 MR ANGIOGRAPHY NECK W/DYE: CPT | Mod: 26

## 2019-04-03 PROCEDURE — 93010 ELECTROCARDIOGRAM REPORT: CPT

## 2019-04-03 PROCEDURE — 70551 MRI BRAIN STEM W/O DYE: CPT | Mod: 26

## 2019-04-03 PROCEDURE — 99226: CPT

## 2019-04-03 RX ORDER — ACETAMINOPHEN 500 MG
650 TABLET ORAL ONCE
Qty: 0 | Refills: 0 | Status: COMPLETED | OUTPATIENT
Start: 2019-04-03 | End: 2019-04-03

## 2019-04-03 RX ORDER — ATORVASTATIN CALCIUM 80 MG/1
10 TABLET, FILM COATED ORAL ONCE
Qty: 0 | Refills: 0 | Status: COMPLETED | OUTPATIENT
Start: 2019-04-03 | End: 2019-04-03

## 2019-04-03 RX ADMIN — ATORVASTATIN CALCIUM 10 MILLIGRAM(S): 80 TABLET, FILM COATED ORAL at 19:58

## 2019-04-03 RX ADMIN — Medication 650 MILLIGRAM(S): at 19:58

## 2019-04-03 NOTE — CONSULT NOTE ADULT - ASSESSMENT
Assessment:  This is a 84y Female with h/o PE's on coumadin, plavix, lupus, HTN,HDL, CAD with stentsx 2        Plan: Pt currently on anticoagulation..  COntinue coumadin, lipitor, and plavix  Admit for work up - [place] obtain a MRI of Brain with MRA of head and Neck  Neuro checks q 4 H  Continue current care    - Assessment:  This is a 84y Female with h/o PE's on coumadin, plavix, lupus, HTN,HDL, CAD with stentsx 2 isnt typical of Pposterior circulation syndrome. As NIH is 0, and no deficts noted        Plan: Pt currently on anticoagulation..  COntinue coumadin, lipitor, and plavix  Admit for work up - [place] obtain a MRI of Brain with MRA of head and Neck  Neuro checks q 4 H  Continue current care    -

## 2019-04-03 NOTE — CONSULT NOTE ADULT - SUBJECTIVE AND OBJECTIVE BOX
Neurology Consult    Patient is a 84y old  Female who presents with a chief complaint of chest pain/tightness/vertigo/SOB    HPI: 85 yo f pmhx DM, HTN, HLD, CAD stent x2, PEx3 (on coumadin) c/o dizziness and headache for 4d in duration that is quick onset associated with n/v and gaging made worse by laying down and improves with sitting up, however symptoms are present in any position and are constant and ongoing. Additionally pt is reporting mod mid sternal CP non radiating with no modifying factors associated with SOB for 1 d in duration. Headache, dizziness and nausea going on for a while but worsened today with chestightness and SOB. She took nitro spray prior to arrival as per patient.    Interval Hx: Upon my arrival, Pt is laying in bed with HOB at 30 with scapes of food on chest. pt states, "I feel much better since Richa eaten" She currently denies dizziness in this position. She describes dizziness or vertigo as  "falling out versus Im going to fall". She states she has been feeling like this for the past 4 days but was pushing through it because her son is in the hospital and she needs to take care of him. She also c/o neck tightness and intermittant pressure on top of her head. She states she has felt this way before. She states she went for (orthrostatic) test and she has had  wide swings in BP when changing her position. She said, "she never follow up after the test. She admits to be on multiple medications etc. Pt denies  room spinning  type vertigo and states "her feeling  of falling are most exagerrated when changing postions. Pt is currently on Coumadin with a INR 3.6 and antiplalets Plavix, and statin home meds        PAST MEDICAL & SURGICAL HISTORY:  OA (osteoarthritis)  Dyslipidemia  Lupus  Pulmonary embolism  DVT (deep venous thrombosis)  Coronary artery dilation  Hypertension  S/P aortic valve replacement with prosthetic valve  H/O: hysterectomy      FAMILY HISTORY:  Family history of coronary artery disease (Father, Mother)      Social History: (-) x 3    Allergies    No Known Allergies    Intolerances        MEDICATIONS  (STANDING):    MEDICATIONS  (PRN):      Review of systems:    Constitutional: No fever, weight loss or fatigue    Eyes: No eye pain or discharge  ENMT:  No difficulty hearing; No sinus or throat pain  Neck: No pain or stiffness  Respiratory: No cough, wheezing, chills or hemoptysis  Cardiovascular: No chest pain, palpitations, shortness of breath, dyspnea on exertion  Gastrointestinal: No abdominal pain, nausea, vomiting or hematemesis; No diarrhea or constipation.   Genitourinary: No dysuria, frequency, hematuria or incontinence  Neurological: As per HPI  Skin: No rashes or lesions   Endocrine: No heat or cold intolerance; No hair loss  Musculoskeletal: No joint pain or swelling  Psychiatric: No depression, anxiety, mood swings  Heme/Lymph: No easy bruising or bleeding gums    Vital Signs Last 24 Hrs  T(C): 35.8 (02 Apr 2019 17:12), Max: 35.9 (02 Apr 2019 14:27)  T(F): 96.5 (02 Apr 2019 17:12), Max: 96.6 (02 Apr 2019 14:27)  HR: 66 (02 Apr 2019 17:12) (66 - 91)  BP: 154/84 (02 Apr 2019 17:12) (152/80 - 154/84)  BP(mean): --  RR: 18 (02 Apr 2019 17:12) (18 - 18)  SpO2: 100% (02 Apr 2019 17:12) (100% - 100%)    Neurologic Examination:  General:  Appearance is consistent with chronologic age.  No abnormal facies.   General: The patient is oriented to person, place, time and date.  Recent and remote memory intact.  Fund of knowledge is intact and normal.  Language with normal repetition, comprehension and naming.  Nondysarthric.    Cranial nerves: intact VA, VFF.  EOMI w/o nystagmus, skew or reported double vision.  PERRL.  No ptosis/weakness of eyelid closure.  Facial sensation is normal with normal bite.  No facial asymmetry.  Hearing grossly intact b/l.  Palate elevates midline.  Tongue midline.  Motor examination:   Normal tone, bulk and range of motion.  No tenderness, twitching, tremors or involuntary movements.  Formal Muscle Strength Testing: (MRC grade R/L) 5/5 UE; 5/5 LE.  No observable drift.  Reflexes:   2+ b/l pectoralis, biceps, triceps, brachioradialis, patella and Achilles.  Plantar response downgoing b/l.  Jaw jerk, Sarahi, clonus absent.  Sensory examination:   Intact to light touch and pinprick, pain, temperature and proprioception and vibration in all extremities.  Cerebellum:   FTN/HKS intact with normal PRIYA in all limbs.  No dysmetria or dysdiadokinesia.  Gait narrow based and normal.    Labs:   CBC Full  -  ( 02 Apr 2019 15:50 )  WBC Count : 3.83 K/uL  RBC Count : 4.22 M/uL  Hemoglobin : 12.5 g/dL  Hematocrit : 38.5 %  Platelet Count - Automated : 309 K/uL  Mean Cell Volume : 91.2 fL  Mean Cell Hemoglobin : 29.6 pg  Mean Cell Hemoglobin Concentration : 32.5 g/dL  Auto Neutrophil # : 2.40 K/uL  Auto Lymphocyte # : 1.10 K/uL  Auto Monocyte # : 0.28 K/uL  Auto Eosinophil # : 0.01 K/uL  Auto Basophil # : 0.03 K/uL  Auto Neutrophil % : 62.6 %  Auto Lymphocyte % : 28.7 %  Auto Monocyte % : 7.3 %  Auto Eosinophil % : 0.3 %  Auto Basophil % : 0.8 %    04-02    142  |  103  |  18  ----------------------------<  106<H>  4.0   |  26  |  1.2    Ca    9.3      02 Apr 2019 22:41    TPro  7.8  /  Alb  4.3  /  TBili  0.4  /  DBili  x   /  AST  61<H>  /  ALT  19  /  AlkPhos  46  04-02    LIVER FUNCTIONS - ( 02 Apr 2019 15:50 )  Alb: 4.3 g/dL / Pro: 7.8 g/dL / ALK PHOS: 46 U/L / ALT: 19 U/L / AST: 61 U/L / GGT: x           PT/INR - ( 02 Apr 2019 15:50 )   PT: >40.00 sec;   INR: 3.60 ratio         PTT - ( 02 Apr 2019 15:50 )  PTT:39.7 sec        Neuroimaging:  NCHCT: CT Head No Cont:   EXAM:  CT BRAIN            PROCEDURE DATE:  04/02/2019            INTERPRETATION:  Clinical History / Reason for exam: Dizziness and nausea.    TECHNIQUE: Contiguous axial CT images were obtained from the base of the   skull to the vertex without administration of intravenous contrast.   Coronal and sagittal reformatted images were constructed.    COMPARISON:CT head 4/4/2015.    FINDINGS:    The ventricles and cortical sulci are within normal limits.    Gray-white matter differentiation is preserved.    There are scattered patchy hypodensities throughout the hemispheric white   matter which are nonspecific and without mass effect, compatible with   chronic microvascular ischemic changes.    There is no evidence for intracranial hemorrhage, significant   space-occupying process, or recent territorial infarction.    The calvarium is intact. Visualized paranasal sinuses and mastoids are   well-aerated.      IMPRESSION:    No CT evidence for acute intracranial pathology.                YANIQUE BERGER M.D., RESIDENT RADIOLOGIST  This document has been electronically signed.  THELMA RAMIREZ M.D., ATTENDING RADIOLOGIST  This document has been electronically signed. Apr 2 2019  8:03PM Neurology Consult    Patient is a 84y old  Female who presents with a chief complaint of chest pain/tightness/vertigo/SOB    HPI: 85 yo f pmhx DM, HTN, HLD, CAD stent x2, PEx3 (on coumadin) c/o dizziness and headache for 4d in duration that is quick onset associated with n/v and gaging made worse by laying down and improves with sitting up, however symptoms are present in any position and are constant and ongoing. Additionally pt is reporting mod mid sternal CP non radiating with no modifying factors associated with SOB for 1 d in duration. Headache, dizziness and nausea going on for a while but worsened today with chestightness and SOB. She took nitro spray prior to arrival as per patient.    Interval Hx: Upon my arrival, Pt is laying in bed with HOB at 30 with scapes of food on chest. pt states, "I feel much better since Richa eaten" She currently denies dizziness in this position. She describes dizziness or vertigo as  "falling out versus Im going to fall". She states she has been feeling like this for the past 4 days but was pushing through it because her son is in the hospital and she needs to take care of him. She also c/o neck tightness and intermittant pressure on top of her head. She states she has felt this way before. She states she went for (orthrostatic) test and she has had  wide swings in BP when changing her position. She said, "she never follow up after the test. She admits to be on multiple medications etc. Pt denies  room spinning  type vertigo and states "her feeling  of falling are most exagerrated when changing postions. Pt is currently on Coumadin with a INR 3.6 and antiplalets Plavix, and statin home meds        PAST MEDICAL & SURGICAL HISTORY:  OA (osteoarthritis)  Dyslipidemia  Lupus  Pulmonary embolism  DVT (deep venous thrombosis)  Coronary artery dilation  Hypertension  S/P aortic valve replacement with prosthetic valve  H/O: hysterectomy      FAMILY HISTORY:  Family history of coronary artery disease (Father, Mother)      Social History: (-) x 3    Allergies    No Known Allergies    Intolerances        MEDICATIONS  (STANDING):    MEDICATIONS  (PRN):      Review of systems:    Constitutional: No fever, weight loss or fatigue    Eyes: No eye pain or discharge  ENMT:  No difficulty hearing; No sinus or throat pain  Neck: No pain or stiffness  Respiratory: No cough, wheezing, chills or hemoptysis  Cardiovascular: No chest pain, palpitations, shortness of breath, dyspnea on exertion  Gastrointestinal: No abdominal pain, nausea, vomiting or hematemesis; No diarrhea or constipation.   Genitourinary: No dysuria, frequency, hematuria or incontinence  Neurological: As per HPI  Skin: No rashes or lesions   Endocrine: No heat or cold intolerance; No hair loss  Musculoskeletal: No joint pain or swelling  Psychiatric: No depression, anxiety, mood swings  Heme/Lymph: No easy bruising or bleeding gums    Vital Signs Last 24 Hrs  T(C): 35.8 (02 Apr 2019 17:12), Max: 35.9 (02 Apr 2019 14:27)  T(F): 96.5 (02 Apr 2019 17:12), Max: 96.6 (02 Apr 2019 14:27)  HR: 66 (02 Apr 2019 17:12) (66 - 91)  BP: 154/84 (02 Apr 2019 17:12) (152/80 - 154/84)  BP(mean): --  RR: 18 (02 Apr 2019 17:12) (18 - 18)  SpO2: 100% (02 Apr 2019 17:12) (100% - 100%)    Neurologic Examination:  NIH 0  General:  Appearance is consistent with chronologic age.  No abnormal facies.   General: The patient is oriented to person, place, time and date.  Recent and remote memory intact.  Fund of knowledge is intact and normal.  Language with normal repetition, comprehension and naming.  Nondysarthric.    Cranial nerves: intact VA, VFF.  EOMI w/o nystagmus, skew or reported double vision.  PERRL.  No ptosis/weakness of eyelid closure.  Facial sensation is normal with normal bite.  No facial asymmetry.  Hearing grossly intact b/l.  Palate elevates midline.  Tongue midline.  Motor examination:   Normal tone, bulk and range of motion.  No tenderness, twitching, tremors or involuntary movements.  Formal Muscle Strength Testing: (MRC grade R/L) 5/5 UE; 5/5 LE.  No observable drift.  Reflexes:   2+ b/l pectoralis, biceps, triceps, brachioradialis, patella and Achilles.  Plantar response downgoing b/l.  Jaw jerk, Sarahi, clonus absent.  Sensory examination:   Intact to light touch and pinprick, pain, temperature and proprioception and vibration in all extremities.  Cerebellum:   FTN/HKS intact with normal PRIYA in all limbs.  No dysmetria or dysdiadokinesia.  Gait narrow based and normal.    Labs:   CBC Full  -  ( 02 Apr 2019 15:50 )  WBC Count : 3.83 K/uL  RBC Count : 4.22 M/uL  Hemoglobin : 12.5 g/dL  Hematocrit : 38.5 %  Platelet Count - Automated : 309 K/uL  Mean Cell Volume : 91.2 fL  Mean Cell Hemoglobin : 29.6 pg  Mean Cell Hemoglobin Concentration : 32.5 g/dL  Auto Neutrophil # : 2.40 K/uL  Auto Lymphocyte # : 1.10 K/uL  Auto Monocyte # : 0.28 K/uL  Auto Eosinophil # : 0.01 K/uL  Auto Basophil # : 0.03 K/uL  Auto Neutrophil % : 62.6 %  Auto Lymphocyte % : 28.7 %  Auto Monocyte % : 7.3 %  Auto Eosinophil % : 0.3 %  Auto Basophil % : 0.8 %    04-02    142  |  103  |  18  ----------------------------<  106<H>  4.0   |  26  |  1.2    Ca    9.3      02 Apr 2019 22:41    TPro  7.8  /  Alb  4.3  /  TBili  0.4  /  DBili  x   /  AST  61<H>  /  ALT  19  /  AlkPhos  46  04-02    LIVER FUNCTIONS - ( 02 Apr 2019 15:50 )  Alb: 4.3 g/dL / Pro: 7.8 g/dL / ALK PHOS: 46 U/L / ALT: 19 U/L / AST: 61 U/L / GGT: x           PT/INR - ( 02 Apr 2019 15:50 )   PT: >40.00 sec;   INR: 3.60 ratio         PTT - ( 02 Apr 2019 15:50 )  PTT:39.7 sec        Neuroimaging:  NCHCT: CT Head No Cont:   EXAM:  CT BRAIN            PROCEDURE DATE:  04/02/2019            INTERPRETATION:  Clinical History / Reason for exam: Dizziness and nausea.    TECHNIQUE: Contiguous axial CT images were obtained from the base of the   skull to the vertex without administration of intravenous contrast.   Coronal and sagittal reformatted images were constructed.    COMPARISON:CT head 4/4/2015.    FINDINGS:    The ventricles and cortical sulci are within normal limits.    Gray-white matter differentiation is preserved.    There are scattered patchy hypodensities throughout the hemispheric white   matter which are nonspecific and without mass effect, compatible with   chronic microvascular ischemic changes.    There is no evidence for intracranial hemorrhage, significant   space-occupying process, or recent territorial infarction.    The calvarium is intact. Visualized paranasal sinuses and mastoids are   well-aerated.      IMPRESSION:    No CT evidence for acute intracranial pathology.                YANIQUE BERGER M.D., RESIDENT RADIOLOGIST  This document has been electronically signed.  THELMA RAMIREZ M.D., ATTENDING RADIOLOGIST  This document has been electronically signed. Apr 2 2019  8:03PM

## 2019-04-03 NOTE — ED CDU PROVIDER SUBSEQUENT DAY NOTE - PROGRESS NOTE DETAILS
pt. in no distress, feels better now. trops negative. not as dizzy. will continue to reassess. pt. went to mri. repeat inr 3.57, will hold coumadin.

## 2019-04-03 NOTE — ED CDU PROVIDER SUBSEQUENT DAY NOTE - HISTORY
pt seen bedside, NAD, no complaints, says her dizziness and headache have subsided. will continue to monitor patient. pt scheduled for MRI.

## 2019-04-03 NOTE — CONSULT NOTE ADULT - ATTENDING COMMENTS
Patient seen and examined and agree with above except as noted.  Patient has 4 days of dizziness which is slightly better but still present at rest.  Worse when she tries to move but is continuous all the time just varies in intensity    A+Ox3  language and attention normal  CN 2-12 normal except decrease temp, LT on left  no drift  Decreased temp and vib on left  FTN NL  gait deferred    NIHSS 1  mrankin 1 (baseline)    Plan as above

## 2019-04-04 LAB
APTT BLD: 35 SEC — SIGNIFICANT CHANGE UP (ref 27–39.2)
INR BLD: 1.85 RATIO — HIGH (ref 0.65–1.3)
PROTHROM AB SERPL-ACNC: 21.2 SEC — HIGH (ref 9.95–12.87)

## 2019-04-04 PROCEDURE — 93010 ELECTROCARDIOGRAM REPORT: CPT

## 2019-04-04 PROCEDURE — 99226: CPT

## 2019-04-04 RX ORDER — MORPHINE SULFATE 50 MG/1
4 CAPSULE, EXTENDED RELEASE ORAL ONCE
Qty: 0 | Refills: 0 | Status: DISCONTINUED | OUTPATIENT
Start: 2019-04-04 | End: 2019-04-04

## 2019-04-04 RX ORDER — LOSARTAN POTASSIUM 100 MG/1
50 TABLET, FILM COATED ORAL DAILY
Qty: 0 | Refills: 0 | Status: DISCONTINUED | OUTPATIENT
Start: 2019-04-04 | End: 2019-04-05

## 2019-04-04 RX ORDER — AMLODIPINE BESYLATE 2.5 MG/1
5 TABLET ORAL ONCE
Qty: 0 | Refills: 0 | Status: COMPLETED | OUTPATIENT
Start: 2019-04-04 | End: 2019-04-04

## 2019-04-04 RX ORDER — WARFARIN SODIUM 2.5 MG/1
5 TABLET ORAL ONCE
Qty: 0 | Refills: 0 | Status: COMPLETED | OUTPATIENT
Start: 2019-04-04 | End: 2019-04-04

## 2019-04-04 RX ORDER — METOPROLOL TARTRATE 50 MG
25 TABLET ORAL ONCE
Qty: 0 | Refills: 0 | Status: COMPLETED | OUTPATIENT
Start: 2019-04-04 | End: 2019-04-04

## 2019-04-04 RX ORDER — ZOLPIDEM TARTRATE 10 MG/1
5 TABLET ORAL ONCE
Qty: 0 | Refills: 0 | Status: DISCONTINUED | OUTPATIENT
Start: 2019-04-04 | End: 2019-04-05

## 2019-04-04 RX ORDER — ATORVASTATIN CALCIUM 80 MG/1
10 TABLET, FILM COATED ORAL ONCE
Qty: 0 | Refills: 0 | Status: COMPLETED | OUTPATIENT
Start: 2019-04-04 | End: 2019-04-04

## 2019-04-04 RX ADMIN — MORPHINE SULFATE 4 MILLIGRAM(S): 50 CAPSULE, EXTENDED RELEASE ORAL at 10:13

## 2019-04-04 RX ADMIN — ATORVASTATIN CALCIUM 10 MILLIGRAM(S): 80 TABLET, FILM COATED ORAL at 22:53

## 2019-04-04 RX ADMIN — LOSARTAN POTASSIUM 50 MILLIGRAM(S): 100 TABLET, FILM COATED ORAL at 16:21

## 2019-04-04 RX ADMIN — WARFARIN SODIUM 5 MILLIGRAM(S): 2.5 TABLET ORAL at 22:53

## 2019-04-04 RX ADMIN — Medication 25 MILLIGRAM(S): at 16:21

## 2019-04-04 RX ADMIN — MORPHINE SULFATE 4 MILLIGRAM(S): 50 CAPSULE, EXTENDED RELEASE ORAL at 10:14

## 2019-04-04 RX ADMIN — AMLODIPINE BESYLATE 5 MILLIGRAM(S): 2.5 TABLET ORAL at 16:21

## 2019-04-04 NOTE — ED CDU PROVIDER SUBSEQUENT DAY NOTE - PROGRESS NOTE DETAILS
pt. is awake and alert, on cardiac monitor. pending mri results. will continue to monitor. I spoke with pt.'s cardiologist dr. Wilcox, he will see pt. tomorrow morning.

## 2019-04-04 NOTE — ED ADULT NURSE REASSESSMENT NOTE - GENERAL PATIENT STATE
cooperative/comfortable appearance/family/SO at bedside
resting/sleeping/comfortable appearance/cooperative
cooperative

## 2019-04-05 VITALS
RESPIRATION RATE: 18 BRPM | SYSTOLIC BLOOD PRESSURE: 163 MMHG | DIASTOLIC BLOOD PRESSURE: 72 MMHG | TEMPERATURE: 98 F | HEART RATE: 86 BPM | OXYGEN SATURATION: 100 %

## 2019-04-05 PROCEDURE — 99217: CPT

## 2019-04-05 RX ORDER — METOPROLOL TARTRATE 50 MG
25 TABLET ORAL ONCE
Qty: 0 | Refills: 0 | Status: COMPLETED | OUTPATIENT
Start: 2019-04-05 | End: 2019-04-05

## 2019-04-05 RX ORDER — AMLODIPINE BESYLATE 2.5 MG/1
5 TABLET ORAL ONCE
Qty: 0 | Refills: 0 | Status: COMPLETED | OUTPATIENT
Start: 2019-04-05 | End: 2019-04-05

## 2019-04-05 RX ADMIN — Medication 650 MILLIGRAM(S): at 08:25

## 2019-04-05 RX ADMIN — Medication 25 MILLIGRAM(S): at 13:01

## 2019-04-05 RX ADMIN — LOSARTAN POTASSIUM 50 MILLIGRAM(S): 100 TABLET, FILM COATED ORAL at 06:04

## 2019-04-05 RX ADMIN — AMLODIPINE BESYLATE 5 MILLIGRAM(S): 2.5 TABLET ORAL at 13:01

## 2019-04-05 NOTE — ED CDU PROVIDER SUBSEQUENT DAY NOTE - ATTENDING CONTRIBUTION TO CARE
Remains in no distress , full workup in progress will continue to re-evaluate
Pt was placed in CDU after an episode of vertigo with chest discomfort and nausea. TIA workup was unremarkable. Pt's sx resolved with meclizine and she began to feel better, tolerating PO, walking with assistance to the bathroom. VS, exam as noted. Pt was seen by Dr Wilcox, who knows her well; her CAD is being managed medically, would not recommend pursuing cardiac workup for her chest discomfort as its seems more likely to be GI in origin and related to her vertigo, which has since resolved. Pt very anxious due to son's psychiatric admission; she lives alone but family is nearby and daughter will be arriving from [place] in the next 1-2 days. Pt comfortable going home, all results reviewed, she will f/u with PMD/Dr Wilcox and return to the ED for worsening sx.
Remains in no distress and with stable vitals, full workup in progress will continue to re-evaluate

## 2019-04-05 NOTE — ED CDU PROVIDER DISPOSITION NOTE - CLINICAL COURSE
Pt was placed in CDU after an episode of vertigo with chest discomfort and nausea. TIA workup was unremarkable. Pt's sx resolved with meclizine and she began to feel better, tolerating PO, walking with assistance to the bathroom. VS, exam as noted. Pt was seen by Dr Wilcox, who knows her well; her CAD is being managed medically, would not recommend pursuing cardiac workup for her chest discomfort as its seems more likely to be GI in origin and related to her vertigo, which has since resolved. Pt very anxious due to son's psychiatric admission; she lives alone but family is nearby and daughter will be arriving from [place] in the next 1-2 days. Pt comfortable going home, all results reviewed, she will f/u with PMD/Dr Wilcox and return to the ED for worsening sx.

## 2019-04-05 NOTE — ED CDU PROVIDER SUBSEQUENT DAY NOTE - MEDICAL DECISION MAKING DETAILS
Full workup in progress will continue to re-evaluate
Pt was placed in CDU after an episode of vertigo with chest discomfort and nausea. TIA workup was unremarkable. Pt's sx resolved with meclizine and she began to feel better, tolerating PO, walking with assistance to the bathroom. VS, exam as noted. Pt was seen by Dr Wilcox, who knows her well; her CAD is being managed medically, would not recommend pursuing cardiac workup for her chest discomfort as its seems more likely to be GI in origin and related to her vertigo, which has since resolved. Pt very anxious due to son's psychiatric admission; she lives alone but family is nearby and daughter will be arriving from [place] in the next 1-2 days. Pt comfortable going home, all results reviewed, she will f/u with PMD/Dr Wilcox and return to the ED for worsening sx.
Full workup in progress will continue to re-evaluate

## 2019-04-05 NOTE — ED CDU PROVIDER DISPOSITION NOTE - PROVIDER TOKENS
FREE:[LAST:[your PMD 1-2 days],PHONE:[(   )    -],FAX:[(   )    -]],PROVIDER:[TOKEN:[[id number]],FOLLOWUP:[1-3 Days]]

## 2019-04-05 NOTE — ED CDU PROVIDER SUBSEQUENT DAY NOTE - PROGRESS NOTE DETAILS
pt. on cardiac monitor, without complaints, MRIs ok. pt. to be seen by dr. Wilcox. coumadin was given last night. Patient seen by Dr. Wilcox, cleared for discharge.

## 2019-04-05 NOTE — ED ADULT NURSE REASSESSMENT NOTE - NS ED NURSE REASSESS COMMENT FT1
patient [id number] from previous RN.  Patient remain s on cardiac monitor.  Patient calm and cooperative.  Patient in stable condition and nad.  Will continue to monitor.
patient sleeping with no difficulty breathing noted. Patient remains on cardiac monitor.  Patient in stable condition and nad.  Will continue to monitor,
patient was medicated as ordered tolerated well with no adverse reaction noted.. Patient remains on cardiac monitor. Patient in stable condition and offers no complaints. Will continue to monitor.
pt assessed, a&ox4, no complaints at this time, denies CP/SOB. awaiting dispo.
pt c/o of CP, EKG done, 4mg morphine given with relief, BP elevated, GALEN Burroughs and MD Abreu aware.
Pt observed comfortably sleeping, no distress noted. Continue to monitor.
Received pt alert, oriented x 3, claimed still feels a little bit dizzy with slight headache but better than it was. PA at bedside. Will monitor.
Pt returned from MRI complaining of chest pressure, no acute distress noted.. PA made aware, repeat EKG done. Continue to monitor.

## 2019-06-06 ENCOUNTER — APPOINTMENT (OUTPATIENT)
Dept: VASCULAR SURGERY | Facility: CLINIC | Age: 84
End: 2019-06-06
Payer: MEDICARE

## 2019-06-06 VITALS
DIASTOLIC BLOOD PRESSURE: 80 MMHG | BODY MASS INDEX: 28.35 KG/M2 | SYSTOLIC BLOOD PRESSURE: 130 MMHG | HEIGHT: 63 IN | WEIGHT: 160 LBS

## 2019-06-06 PROCEDURE — 99213 OFFICE O/P EST LOW 20 MIN: CPT

## 2019-06-06 PROCEDURE — 93970 EXTREMITY STUDY: CPT

## 2019-06-06 NOTE — HISTORY OF PRESENT ILLNESS
[FreeTextEntry1] : Ms. Damon is a pleasant 84 year-old female with a history of recurrent lower extremity deep vein thrombosis in 2003, 2009, and most recently acute on chronic DVT in December of 2016. She also has a known history of a GI bleed and had been tolerating the Coumadin well. She recently was changed to Eliquis. She also had a coronary stent placed at [facility] for chest pain. She has no complaints at this time.

## 2019-06-06 NOTE — DATA REVIEWED
[FreeTextEntry1] : I performed a venous duplex which was medically necessary to evaluate for any acute DVT. It showed chronic changes in the common femoral, femoral and popliteal veins bilaterally\par \par

## 2019-06-06 NOTE — ASSESSMENT
[FreeTextEntry1] : Ms. Damon is a pleasant 84 year-old female with a history of recurrent deep vein thrombosis and presents for followup. She has a history of anemia however her GI workup was negative for acute bleeding. She is currently  on her Coumadin regimen. I performed a venous duplex my office this shows chronic changes in the common femoral, femoral veins and popliteal veins bilaterally. Ms. Damon has a known history of an IVC filter. She has history of recurrent DVT and is at risk for developing acute DVT if taken off anticoagulation. I recommend long-term anticoagulation if not contraindicated. I will see her back in one years time.

## 2019-11-11 ENCOUNTER — INPATIENT (INPATIENT)
Facility: HOSPITAL | Age: 84
LOS: 2 days | Discharge: HOME | End: 2019-11-14
Attending: INTERNAL MEDICINE | Admitting: INTERNAL MEDICINE
Payer: MEDICARE

## 2019-11-11 VITALS
RESPIRATION RATE: 20 BRPM | HEART RATE: 89 BPM | WEIGHT: 167.99 LBS | TEMPERATURE: 96 F | SYSTOLIC BLOOD PRESSURE: 146 MMHG | DIASTOLIC BLOOD PRESSURE: 89 MMHG | OXYGEN SATURATION: 100 %

## 2019-11-11 DIAGNOSIS — Z95.2 PRESENCE OF PROSTHETIC HEART VALVE: Chronic | ICD-10-CM

## 2019-11-11 DIAGNOSIS — Z98.890 OTHER SPECIFIED POSTPROCEDURAL STATES: Chronic | ICD-10-CM

## 2019-11-11 PROCEDURE — 93010 ELECTROCARDIOGRAM REPORT: CPT

## 2019-11-11 PROCEDURE — 99285 EMERGENCY DEPT VISIT HI MDM: CPT

## 2019-11-11 PROCEDURE — 71045 X-RAY EXAM CHEST 1 VIEW: CPT | Mod: 26

## 2019-11-11 RX ORDER — MORPHINE SULFATE 50 MG/1
2 CAPSULE, EXTENDED RELEASE ORAL
Refills: 0 | Status: DISCONTINUED | OUTPATIENT
Start: 2019-11-11 | End: 2019-11-14

## 2019-11-11 NOTE — ED PROVIDER NOTE - NS ED ROS FT
Review of Systems    Constitutional: (-) fever   Eyes/ENT: (-) vision changes  Cardiovascular: (+) chest pain, (-) syncope (-) palpitations  Respiratory: (-) cough, (+) shortness of breath  Gastrointestinal: (-) vomiting, (-) diarrhea (+)black/bloody stools (+) abdominal pain  Genitourinary:  (-) dysuria   Musculoskeletal: (-) neck pain, (-) back pain, (-) leg pain/swelling  Integumentary: (-) rash, (-) edema  Neurological: (-) headache, (-) confusion  Hematologic: (-) easy bruising   Allergic/Immunologic: (-) pruritus

## 2019-11-11 NOTE — ED PROVIDER NOTE - OBJECTIVE STATEMENT
86 y/o F with PMH HTN, HLD, CAD with PCI x 7 (most recent 4/2019), PE x 3, AVR (2/2018) on eliquis, chronicly black stools 2/2 84 y/o F with PMH HTN, HLD, CAD with PCI x 7 (most recent 4/2019), PE x 3, AVR (2/2018) on eliquis, chronically black stools 2/2 iron supplements, hemorrhoids presents with 1 episode of red bloody explosive diarrhea mixed in with black stool x tonight as well as substernal tight non-radiating chest pressure/sob upon arrival to ED. no palliating/provoking factors. she does relate lightheadedness x 1 year, unchanged-- she saw her cardio for this 3 days ago and he advised stopping ranexa to see if this was culprit-- she has not taken it in 3 days. +N , no vomiting. +LLQ abdominal pain since the bowel movement. no fevers.  no recent abx use/traveling/sick contacts.

## 2019-11-11 NOTE — ED PROVIDER NOTE - CLINICAL SUMMARY MEDICAL DECISION MAKING FREE TEXT BOX
pt seen for episode dark stools and chest pressure while in ED, labs and imaging reviewed, CT A/P without any acute findings, trop negative and Hb stable. pt admitted for further monitoring given pt on NOAC with concern for bleeding as well as chest pressure which needs monitoring. Multiple attempts made to call her PMD who was on call Dr. Manning both through service and directly to cell phone without answer. Pt admitted to hospitalist.

## 2019-11-11 NOTE — ED PROVIDER NOTE - ATTENDING CONTRIBUTION TO CARE
84yo F with PMHx CAD/stents x7, PE on eliquis, HTN, HLD, lupus, presents after 1 episode of bloody stool today, bright red blood intermixed with dark stool. Pt states stool is always dark, takes iron. Also c/o low abdominal pain since having the BM, moderate, nonradiating, aching/dull, no alleviating factors. Assoc with nausea, lightheadedness. Denies fever, chills, headache, CP, SOB, cough, palpitations, vomiting, dysuria, leg swelling.     Vital signs reviewed  GENERAL: Patient nontoxic appearing, NAD  HEAD: NCAT  EYES: Anicteric  ENT: MMM  RESPIRATORY: Normal respiratory effort. Speaking full sentences. No retractions. CTA B/L. No wheezing, rales, rhonchi  CARDIOVASCULAR: Regular rate and rhythm  ABDOMEN: Soft. Nondistended. LLQ and RLQ mild-moderate TTP. LUQ moderate TTP. No guarding or rebound. No CVA tenderness.  RECTAL: Exam per GALEN Chambers. Dark stool, not tarry or melena. No BRBPR.   MUSCULOSKELETAL/EXTREMITIES: Brisk cap refill. Equal radial pulses. No leg edema.  SKIN:  Warm and dry  NEURO: AAOx3. No gross FND.

## 2019-11-11 NOTE — ED PROVIDER NOTE - PHYSICAL EXAMINATION
PHYSICAL EXAM:    GENERAL: Alert, appears stated age, well appearing, non-toxic  SKIN: Warm, pink and dry. MMM.   HEAD: NC, AT, no step offs   EYE: Normal lids/conjunctiva, PERRL, EOMI  ENT: Normal hearing, patent oropharynx without erythema or exudate, TMs clear b/l. uvula midline  NECK: +supple. No meningismus, or JVD, +Trachea midline. no tenderness/step offs.   Pulm: Bilateral BS, normal resp effort, no wheezes, stridor, or retractions  CV: RRR, no M/R/G, 2+and = radial pulses  Abd: soft, +LLQ TTP. no rebound/guarding. non-distended, no hepatosplenomegaly. no CVA tenderness.   RECTAL: anorectal area with non-thrombosed hemorrhoids. soft dark stool, no BRBPR.   Mskel: no erythema, cyanosis, edema. no calf tenderness  Neuro: AAOx3, 5/5 strength throughout.

## 2019-11-11 NOTE — ED PROVIDER NOTE - PROGRESS NOTE DETAILS
Pt s/o to me by Dr. Orellana to follow up imaging, reassess and dispo. discussed with MAR, pending callback from Dr. Chan service. paged dr. reyna/steve service x 3 over course of 1.5hrs and dr. to attempted fulops cell who is on call without answer. admitted to hospitalist.

## 2019-11-12 DIAGNOSIS — Z98.890 OTHER SPECIFIED POSTPROCEDURAL STATES: Chronic | ICD-10-CM

## 2019-11-12 DIAGNOSIS — Z96.652 PRESENCE OF LEFT ARTIFICIAL KNEE JOINT: Chronic | ICD-10-CM

## 2019-11-12 LAB
ALBUMIN SERPL ELPH-MCNC: 4.1 G/DL — SIGNIFICANT CHANGE UP (ref 3.5–5.2)
ALBUMIN SERPL ELPH-MCNC: 4.2 G/DL — SIGNIFICANT CHANGE UP (ref 3.5–5.2)
ALP SERPL-CCNC: 75 U/L — SIGNIFICANT CHANGE UP (ref 30–115)
ALP SERPL-CCNC: 80 U/L — SIGNIFICANT CHANGE UP (ref 30–115)
ALT FLD-CCNC: 14 U/L — SIGNIFICANT CHANGE UP (ref 0–41)
ALT FLD-CCNC: 14 U/L — SIGNIFICANT CHANGE UP (ref 0–41)
ANION GAP SERPL CALC-SCNC: 17 MMOL/L — HIGH (ref 7–14)
ANION GAP SERPL CALC-SCNC: 17 MMOL/L — HIGH (ref 7–14)
APPEARANCE UR: CLEAR — SIGNIFICANT CHANGE UP
APTT BLD: 33.9 SEC — SIGNIFICANT CHANGE UP (ref 27–39.2)
APTT BLD: >200 SEC — CRITICAL HIGH (ref 27–39.2)
AST SERPL-CCNC: 25 U/L — SIGNIFICANT CHANGE UP (ref 0–41)
AST SERPL-CCNC: 26 U/L — SIGNIFICANT CHANGE UP (ref 0–41)
BACTERIA # UR AUTO: ABNORMAL
BASOPHILS # BLD AUTO: 0.03 K/UL — SIGNIFICANT CHANGE UP (ref 0–0.2)
BASOPHILS # BLD AUTO: 0.03 K/UL — SIGNIFICANT CHANGE UP (ref 0–0.2)
BASOPHILS NFR BLD AUTO: 0.7 % — SIGNIFICANT CHANGE UP (ref 0–1)
BASOPHILS NFR BLD AUTO: 0.7 % — SIGNIFICANT CHANGE UP (ref 0–1)
BILIRUB SERPL-MCNC: 0.4 MG/DL — SIGNIFICANT CHANGE UP (ref 0.2–1.2)
BILIRUB SERPL-MCNC: 0.5 MG/DL — SIGNIFICANT CHANGE UP (ref 0.2–1.2)
BILIRUB UR-MCNC: NEGATIVE — SIGNIFICANT CHANGE UP
BLD GP AB SCN SERPL QL: SIGNIFICANT CHANGE UP
BUN SERPL-MCNC: 23 MG/DL — HIGH (ref 10–20)
BUN SERPL-MCNC: 32 MG/DL — HIGH (ref 10–20)
CALCIUM SERPL-MCNC: 9.6 MG/DL — SIGNIFICANT CHANGE UP (ref 8.5–10.1)
CALCIUM SERPL-MCNC: 9.6 MG/DL — SIGNIFICANT CHANGE UP (ref 8.5–10.1)
CHLORIDE SERPL-SCNC: 101 MMOL/L — SIGNIFICANT CHANGE UP (ref 98–110)
CHLORIDE SERPL-SCNC: 99 MMOL/L — SIGNIFICANT CHANGE UP (ref 98–110)
CK MB CFR SERPL CALC: 5.6 NG/ML — SIGNIFICANT CHANGE UP (ref 0.6–6.3)
CK SERPL-CCNC: 140 U/L — SIGNIFICANT CHANGE UP (ref 0–225)
CO2 SERPL-SCNC: 23 MMOL/L — SIGNIFICANT CHANGE UP (ref 17–32)
CO2 SERPL-SCNC: 24 MMOL/L — SIGNIFICANT CHANGE UP (ref 17–32)
COLOR SPEC: SIGNIFICANT CHANGE UP
CREAT SERPL-MCNC: 1.1 MG/DL — SIGNIFICANT CHANGE UP (ref 0.7–1.5)
CREAT SERPL-MCNC: 1.7 MG/DL — HIGH (ref 0.7–1.5)
DIFF PNL FLD: ABNORMAL
EOSINOPHIL # BLD AUTO: 0.03 K/UL — SIGNIFICANT CHANGE UP (ref 0–0.7)
EOSINOPHIL # BLD AUTO: 0.05 K/UL — SIGNIFICANT CHANGE UP (ref 0–0.7)
EOSINOPHIL NFR BLD AUTO: 0.7 % — SIGNIFICANT CHANGE UP (ref 0–8)
EOSINOPHIL NFR BLD AUTO: 1.2 % — SIGNIFICANT CHANGE UP (ref 0–8)
EPI CELLS # UR: 2 /HPF — SIGNIFICANT CHANGE UP (ref 0–5)
GLUCOSE SERPL-MCNC: 110 MG/DL — HIGH (ref 70–99)
GLUCOSE SERPL-MCNC: 128 MG/DL — HIGH (ref 70–99)
GLUCOSE UR QL: NEGATIVE — SIGNIFICANT CHANGE UP
HCT VFR BLD CALC: 33.4 % — LOW (ref 37–47)
HCT VFR BLD CALC: 34.6 % — LOW (ref 37–47)
HCT VFR BLD CALC: 34.6 % — LOW (ref 37–47)
HGB BLD-MCNC: 11.2 G/DL — LOW (ref 12–16)
HGB BLD-MCNC: 11.5 G/DL — LOW (ref 12–16)
HGB BLD-MCNC: 11.5 G/DL — LOW (ref 12–16)
HYALINE CASTS # UR AUTO: 0 /LPF — SIGNIFICANT CHANGE UP (ref 0–7)
IMM GRANULOCYTES NFR BLD AUTO: 0.2 % — SIGNIFICANT CHANGE UP (ref 0.1–0.3)
IMM GRANULOCYTES NFR BLD AUTO: 0.2 % — SIGNIFICANT CHANGE UP (ref 0.1–0.3)
INR BLD: 1.21 RATIO — SIGNIFICANT CHANGE UP (ref 0.65–1.3)
KETONES UR-MCNC: NEGATIVE — SIGNIFICANT CHANGE UP
LEUKOCYTE ESTERASE UR-ACNC: NEGATIVE — SIGNIFICANT CHANGE UP
LIDOCAIN IGE QN: 71 U/L — HIGH (ref 7–60)
LYMPHOCYTES # BLD AUTO: 1.49 K/UL — SIGNIFICANT CHANGE UP (ref 1.2–3.4)
LYMPHOCYTES # BLD AUTO: 1.66 K/UL — SIGNIFICANT CHANGE UP (ref 1.2–3.4)
LYMPHOCYTES # BLD AUTO: 35.2 % — SIGNIFICANT CHANGE UP (ref 20.5–51.1)
LYMPHOCYTES # BLD AUTO: 40.9 % — SIGNIFICANT CHANGE UP (ref 20.5–51.1)
MAGNESIUM SERPL-MCNC: 2 MG/DL — SIGNIFICANT CHANGE UP (ref 1.8–2.4)
MCHC RBC-ENTMCNC: 29.4 PG — SIGNIFICANT CHANGE UP (ref 27–31)
MCHC RBC-ENTMCNC: 29.9 PG — SIGNIFICANT CHANGE UP (ref 27–31)
MCHC RBC-ENTMCNC: 30 PG — SIGNIFICANT CHANGE UP (ref 27–31)
MCHC RBC-ENTMCNC: 33.2 G/DL — SIGNIFICANT CHANGE UP (ref 32–37)
MCHC RBC-ENTMCNC: 33.2 G/DL — SIGNIFICANT CHANGE UP (ref 32–37)
MCHC RBC-ENTMCNC: 33.5 G/DL — SIGNIFICANT CHANGE UP (ref 32–37)
MCV RBC AUTO: 88.5 FL — SIGNIFICANT CHANGE UP (ref 81–99)
MCV RBC AUTO: 89.3 FL — SIGNIFICANT CHANGE UP (ref 81–99)
MCV RBC AUTO: 90.3 FL — SIGNIFICANT CHANGE UP (ref 81–99)
MONOCYTES # BLD AUTO: 0.42 K/UL — SIGNIFICANT CHANGE UP (ref 0.1–0.6)
MONOCYTES # BLD AUTO: 0.49 K/UL — SIGNIFICANT CHANGE UP (ref 0.1–0.6)
MONOCYTES NFR BLD AUTO: 12.1 % — HIGH (ref 1.7–9.3)
MONOCYTES NFR BLD AUTO: 9.9 % — HIGH (ref 1.7–9.3)
NEUTROPHILS # BLD AUTO: 1.84 K/UL — SIGNIFICANT CHANGE UP (ref 1.4–6.5)
NEUTROPHILS # BLD AUTO: 2.23 K/UL — SIGNIFICANT CHANGE UP (ref 1.4–6.5)
NEUTROPHILS NFR BLD AUTO: 45.4 % — SIGNIFICANT CHANGE UP (ref 42.2–75.2)
NEUTROPHILS NFR BLD AUTO: 52.8 % — SIGNIFICANT CHANGE UP (ref 42.2–75.2)
NITRITE UR-MCNC: NEGATIVE — SIGNIFICANT CHANGE UP
NRBC # BLD: 0 /100 WBCS — SIGNIFICANT CHANGE UP (ref 0–0)
NT-PROBNP SERPL-SCNC: 798 PG/ML — HIGH (ref 0–300)
PH UR: 6 — SIGNIFICANT CHANGE UP (ref 5–8)
PLATELET # BLD AUTO: 339 K/UL — SIGNIFICANT CHANGE UP (ref 130–400)
PLATELET # BLD AUTO: 355 K/UL — SIGNIFICANT CHANGE UP (ref 130–400)
PLATELET # BLD AUTO: 358 K/UL — SIGNIFICANT CHANGE UP (ref 130–400)
POTASSIUM SERPL-MCNC: 3.5 MMOL/L — SIGNIFICANT CHANGE UP (ref 3.5–5)
POTASSIUM SERPL-MCNC: 4 MMOL/L — SIGNIFICANT CHANGE UP (ref 3.5–5)
POTASSIUM SERPL-SCNC: 3.5 MMOL/L — SIGNIFICANT CHANGE UP (ref 3.5–5)
POTASSIUM SERPL-SCNC: 4 MMOL/L — SIGNIFICANT CHANGE UP (ref 3.5–5)
PROT SERPL-MCNC: 7 G/DL — SIGNIFICANT CHANGE UP (ref 6–8)
PROT SERPL-MCNC: 7.1 G/DL — SIGNIFICANT CHANGE UP (ref 6–8)
PROT UR-MCNC: SIGNIFICANT CHANGE UP
PROTHROM AB SERPL-ACNC: 13.9 SEC — HIGH (ref 9.95–12.87)
RBC # BLD: 3.74 M/UL — LOW (ref 4.2–5.4)
RBC # BLD: 3.83 M/UL — LOW (ref 4.2–5.4)
RBC # BLD: 3.91 M/UL — LOW (ref 4.2–5.4)
RBC # FLD: 14.4 % — SIGNIFICANT CHANGE UP (ref 11.5–14.5)
RBC # FLD: 14.5 % — SIGNIFICANT CHANGE UP (ref 11.5–14.5)
RBC # FLD: 14.6 % — HIGH (ref 11.5–14.5)
RBC CASTS # UR COMP ASSIST: 10 /HPF — HIGH (ref 0–4)
SODIUM SERPL-SCNC: 140 MMOL/L — SIGNIFICANT CHANGE UP (ref 135–146)
SODIUM SERPL-SCNC: 141 MMOL/L — SIGNIFICANT CHANGE UP (ref 135–146)
SP GR SPEC: 1.03 — HIGH (ref 1.01–1.02)
TROPONIN T SERPL-MCNC: 0.02 NG/ML — HIGH
TROPONIN T SERPL-MCNC: 0.02 NG/ML — HIGH
TROPONIN T SERPL-MCNC: 0.04 NG/ML — CRITICAL HIGH
UROBILINOGEN FLD QL: SIGNIFICANT CHANGE UP
WBC # BLD: 4.06 K/UL — LOW (ref 4.8–10.8)
WBC # BLD: 4.23 K/UL — LOW (ref 4.8–10.8)
WBC # BLD: 4.29 K/UL — LOW (ref 4.8–10.8)
WBC # FLD AUTO: 4.06 K/UL — LOW (ref 4.8–10.8)
WBC # FLD AUTO: 4.23 K/UL — LOW (ref 4.8–10.8)
WBC # FLD AUTO: 4.29 K/UL — LOW (ref 4.8–10.8)
WBC UR QL: 1 /HPF — SIGNIFICANT CHANGE UP (ref 0–5)

## 2019-11-12 PROCEDURE — 99222 1ST HOSP IP/OBS MODERATE 55: CPT

## 2019-11-12 PROCEDURE — 74177 CT ABD & PELVIS W/CONTRAST: CPT | Mod: 26

## 2019-11-12 PROCEDURE — 93010 ELECTROCARDIOGRAM REPORT: CPT

## 2019-11-12 RX ORDER — MORPHINE SULFATE 50 MG/1
2 CAPSULE, EXTENDED RELEASE ORAL EVERY 6 HOURS
Refills: 0 | Status: DISCONTINUED | OUTPATIENT
Start: 2019-11-12 | End: 2019-11-14

## 2019-11-12 RX ORDER — PANTOPRAZOLE SODIUM 20 MG/1
40 TABLET, DELAYED RELEASE ORAL
Refills: 0 | Status: DISCONTINUED | OUTPATIENT
Start: 2019-11-12 | End: 2019-11-14

## 2019-11-12 RX ORDER — HEPARIN SODIUM 5000 [USP'U]/ML
1100 INJECTION INTRAVENOUS; SUBCUTANEOUS
Qty: 25000 | Refills: 0 | Status: DISCONTINUED | OUTPATIENT
Start: 2019-11-12 | End: 2019-11-13

## 2019-11-12 RX ORDER — OMEPRAZOLE 10 MG/1
1 CAPSULE, DELAYED RELEASE ORAL
Qty: 0 | Refills: 0 | DISCHARGE

## 2019-11-12 RX ORDER — SODIUM CHLORIDE 9 MG/ML
1000 INJECTION INTRAMUSCULAR; INTRAVENOUS; SUBCUTANEOUS
Refills: 0 | Status: DISCONTINUED | OUTPATIENT
Start: 2019-11-12 | End: 2019-11-13

## 2019-11-12 RX ORDER — SODIUM CHLORIDE 9 MG/ML
1000 INJECTION, SOLUTION INTRAVENOUS ONCE
Refills: 0 | Status: COMPLETED | OUTPATIENT
Start: 2019-11-12 | End: 2019-11-12

## 2019-11-12 RX ORDER — CLOPIDOGREL BISULFATE 75 MG/1
75 TABLET, FILM COATED ORAL DAILY
Refills: 0 | Status: DISCONTINUED | OUTPATIENT
Start: 2019-11-12 | End: 2019-11-14

## 2019-11-12 RX ORDER — AMLODIPINE BESYLATE 2.5 MG/1
5 TABLET ORAL DAILY
Refills: 0 | Status: DISCONTINUED | OUTPATIENT
Start: 2019-11-12 | End: 2019-11-14

## 2019-11-12 RX ORDER — ACETAMINOPHEN 500 MG
650 TABLET ORAL EVERY 6 HOURS
Refills: 0 | Status: DISCONTINUED | OUTPATIENT
Start: 2019-11-12 | End: 2019-11-14

## 2019-11-12 RX ORDER — NITROGLYCERIN 6.5 MG
0.4 CAPSULE, EXTENDED RELEASE ORAL
Refills: 0 | Status: DISCONTINUED | OUTPATIENT
Start: 2019-11-12 | End: 2019-11-14

## 2019-11-12 RX ORDER — ATORVASTATIN CALCIUM 80 MG/1
10 TABLET, FILM COATED ORAL DAILY
Refills: 0 | Status: DISCONTINUED | OUTPATIENT
Start: 2019-11-12 | End: 2019-11-14

## 2019-11-12 RX ORDER — HEPARIN SODIUM 5000 [USP'U]/ML
1400 INJECTION INTRAVENOUS; SUBCUTANEOUS
Qty: 25000 | Refills: 0 | Status: DISCONTINUED | OUTPATIENT
Start: 2019-11-12 | End: 2019-11-12

## 2019-11-12 RX ORDER — METOPROLOL TARTRATE 50 MG
50 TABLET ORAL DAILY
Refills: 0 | Status: DISCONTINUED | OUTPATIENT
Start: 2019-11-12 | End: 2019-11-14

## 2019-11-12 RX ORDER — FERROUS SULFATE 325(65) MG
325 TABLET ORAL DAILY
Refills: 0 | Status: DISCONTINUED | OUTPATIENT
Start: 2019-11-12 | End: 2019-11-14

## 2019-11-12 RX ORDER — WARFARIN SODIUM 2.5 MG/1
1 TABLET ORAL
Qty: 0 | Refills: 0 | DISCHARGE

## 2019-11-12 RX ADMIN — HEPARIN SODIUM 14 UNIT(S)/HR: 5000 INJECTION INTRAVENOUS; SUBCUTANEOUS at 21:48

## 2019-11-12 RX ADMIN — HEPARIN SODIUM 11 UNIT(S)/HR: 5000 INJECTION INTRAVENOUS; SUBCUTANEOUS at 23:59

## 2019-11-12 RX ADMIN — ATORVASTATIN CALCIUM 10 MILLIGRAM(S): 80 TABLET, FILM COATED ORAL at 12:04

## 2019-11-12 RX ADMIN — MORPHINE SULFATE 2 MILLIGRAM(S): 50 CAPSULE, EXTENDED RELEASE ORAL at 08:24

## 2019-11-12 RX ADMIN — MORPHINE SULFATE 2 MILLIGRAM(S): 50 CAPSULE, EXTENDED RELEASE ORAL at 08:07

## 2019-11-12 RX ADMIN — SODIUM CHLORIDE 60 MILLILITER(S): 9 INJECTION INTRAMUSCULAR; INTRAVENOUS; SUBCUTANEOUS at 21:48

## 2019-11-12 RX ADMIN — Medication 325 MILLIGRAM(S): at 12:04

## 2019-11-12 RX ADMIN — Medication 50 MILLIGRAM(S): at 15:00

## 2019-11-12 RX ADMIN — MORPHINE SULFATE 2 MILLIGRAM(S): 50 CAPSULE, EXTENDED RELEASE ORAL at 20:02

## 2019-11-12 RX ADMIN — CLOPIDOGREL BISULFATE 75 MILLIGRAM(S): 75 TABLET, FILM COATED ORAL at 12:04

## 2019-11-12 RX ADMIN — PANTOPRAZOLE SODIUM 40 MILLIGRAM(S): 20 TABLET, DELAYED RELEASE ORAL at 21:48

## 2019-11-12 RX ADMIN — MORPHINE SULFATE 2 MILLIGRAM(S): 50 CAPSULE, EXTENDED RELEASE ORAL at 12:05

## 2019-11-12 RX ADMIN — AMLODIPINE BESYLATE 5 MILLIGRAM(S): 2.5 TABLET ORAL at 12:22

## 2019-11-12 RX ADMIN — SODIUM CHLORIDE 60 MILLILITER(S): 9 INJECTION INTRAMUSCULAR; INTRAVENOUS; SUBCUTANEOUS at 12:05

## 2019-11-12 RX ADMIN — SODIUM CHLORIDE 1000 MILLILITER(S): 9 INJECTION, SOLUTION INTRAVENOUS at 02:48

## 2019-11-12 RX ADMIN — MORPHINE SULFATE 2 MILLIGRAM(S): 50 CAPSULE, EXTENDED RELEASE ORAL at 20:59

## 2019-11-12 RX ADMIN — HEPARIN SODIUM 14 UNIT(S)/HR: 5000 INJECTION INTRAVENOUS; SUBCUTANEOUS at 12:05

## 2019-11-12 NOTE — H&P ADULT - NSHPLABSRESULTS_GEN_ALL_CORE
11.5   4.23  )-----------( 358      ( 11 Nov 2019 23:50 )             34.6     11-11    140  |  99  |  32<H>  ----------------------------<  110<H>  4.0   |  24  |  1.7<H>    Ca    9.6      11 Nov 2019 23:50  Mg     2.0     11-11    TPro  7.0  /  Alb  4.1  /  TBili  0.4  /  DBili  x   /  AST  25  /  ALT  14  /  AlkPhos  80  11-11    < from: 12 Lead ECG (11.12.19 @ 01:36) >    Diagnosis Line Sinus rhythm with 1st degree A-V block  Possible Left atrial enlargement  Left anterior fascicular block  Left ventricular hypertrophy  Abnormal ECG    < end of copied text >    < from: CT Abdomen and Pelvis w/ IV Cont (11.12.19 @ 04:03) >        IMPRESSION:     No CT evidence for acute intra-abdominal or pelvic pathology    < end of copied text >

## 2019-11-12 NOTE — H&P ADULT - NSICDXPASTMEDICALHX_GEN_ALL_CORE_FT
PAST MEDICAL HISTORY:  Coronary artery dilation     DVT (deep venous thrombosis)     Dyslipidemia     Hypertension     Lupus     OA (osteoarthritis)     Pulmonary embolism

## 2019-11-12 NOTE — CONSULT NOTE ADULT - SUBJECTIVE AND OBJECTIVE BOX
Gastroenterology Consultation:    Patient is a 85y old  Female who presents with a chief complaint of bloody stools (12 Nov 2019 09:56)      Admitted on: 11-12-19  HPI:  84 yo female patient with PMH of CAD s/p PCI and 7 stents placement (last one in 4/2019), on Plavix daily, HTN, HLD, DVT and PE s/p IVC filter and receiving eliquis, AVR ( changed to eliquis from coumadin on 4/2019 by Dr Mccabe), OA presented for one episode of large volume and loose bowel movement with fresh blood that happened yesterday. She has as hx of constipation for which she take stool softeners and mentions seeing in the past small amount fresh blood on the toilet paper, but no hx of large amount of blood with stools She has a hx of black stools because of taking iron supplementation. She also mentioned lower quadrant abdominal pain that has been present for the past 2 months. She has done colonoscopy 2 years ago where 5 polys were removed, no evidence of malignancy. She denied fever, nausea or vomiting.   She also mentioned chest pain, oppressive in nature located at lower left side of the chest, has been present for months for which she is taking sublingual nitroglycerine spray as needed with relief. The pain has not increased in severity or frequency for which she is using around 3 times per week of nitro spray. She has been also experiencing lightheadedness episodes, usually when she stands from sitting position or when walking. She has seen her cardiologist last week that has advised her to stop taking ranolazine as it may be causing her symptoms.     In the ED /89 HR 89 T 96.3.   SCARLET was done and showed hemorrhoids with no signs of bleed.   Hgb 11.5, around her baseline. CAT a/p showed no acute intra abdominal pathology.   EGG showed sinus rythm with no signs of ischemia. trops were detectable (12 Nov 2019 09:56)      Prior records Reviewed (Y/N):  History obtained from person other than patient (Y/N):    Prior EGD:  Prior Colonoscopy:      PAST MEDICAL & SURGICAL HISTORY:  OA (osteoarthritis)  Dyslipidemia  Lupus  Pulmonary embolism  DVT (deep venous thrombosis)  Coronary artery dilation  Hypertension  S/P partial thyroidectomy  History of total knee replacement, left  S/P aortic valve replacement with prosthetic valve  H/O: hysterectomy      FAMILY HISTORY:  Family history of coronary artery disease (Father, Mother)      Social History:  Tobacco:  Alcohol:  Drugs:    Home Medications:  amLODIPine 5 mg oral tablet: 1 tab(s) orally once a day (12 Nov 2019 09:55)  Eliquis 2.5 mg oral tablet: 1 tab(s) orally 2 times a day (12 Nov 2019 09:55)  ferrous sulfate 325 mg (65 mg elemental iron) oral tablet: 1 tab(s) orally once a day (12 Nov 2019 09:55)  Lasix 20 mg oral tablet: 1 tab(s) orally once a day (12 Nov 2019 09:55)  Lipitor 10 mg oral tablet: 1 tab(s) orally once a day (12 Nov 2019 09:55)  metoprolol succinate 50 mg oral tablet, extended release: 1 tab(s) orally once a day (12 Nov 2019 09:55)  nitroglycerin 0.4 mg sublingual spray: 1 spray(s) sublingual every 5 minutes as needed (12 Nov 2019 09:55)  omeprazole 40 mg oral delayed release capsule: 1 cap(s) orally once a day (12 Nov 2019 09:55)  Plavix 75 mg oral tablet: 1 tab(s) orally every 48 hours (12 Nov 2019 09:55)  ranolazine 500 mg oral tablet, extended release: 1 tab(s) orally once a day (12 Nov 2019 09:55)  telmisartan 20 mg oral tablet: 1 tab(s) orally once a day (12 Nov 2019 09:55)    MEDICATIONS  (STANDING):  amLODIPine   Tablet 5 milliGRAM(s) Oral daily  atorvastatin Oral Tab/Cap - Peds 10 milliGRAM(s) Oral daily  clopidogrel Tablet 75 milliGRAM(s) Oral daily  ferrous sulfate Oral Tab/Cap - Peds 325 milliGRAM(s) Oral daily  heparin  Infusion 1400 Unit(s)/Hr (14 mL/Hr) IV Continuous <Continuous>  metoprolol succinate ER 50 milliGRAM(s) Oral daily  pantoprazole    Tablet 40 milliGRAM(s) Oral before breakfast  sodium chloride 0.9%. 1000 milliLiter(s) (60 mL/Hr) IV Continuous <Continuous>    MEDICATIONS  (PRN):  acetaminophen   Tablet .. 650 milliGRAM(s) Oral every 6 hours PRN Moderate Pain (4 - 6)  morphine  - Injectable 2 milliGRAM(s) IV Push every 5 minutes PRN Chest Pain  morphine  - Injectable 2 milliGRAM(s) IV Push every 6 hours PRN Severe Pain (7 - 10)  nitroglycerin     SubLingual 0.4 milliGRAM(s) SubLingual every 10 minutes PRN Chest Pain      Allergies  No Known Allergies      Review of Systems:   Constitutional:  No Fever, No Chills  ENT/Mouth:  No Hearing Changes,  No Difficulty Swallowing  Eyes:  No Eye Pain, No Vision Changes  Cardiovascular:  No Chest Pain, No Palpitations  Respiratory:  No Cough, No Dyspnea  Gastrointestinal:  As described in HPI  Musculoskeletal:  No Joint Swelling, No Back Pain  Skin:  No Skin Lesions, No Jaundice  Neuro:  No Syncope, No Dizziness  Heme/Lymph:  No Bruising, No Bleeding.          Physical Examination:  T(C): 36.6 (11-12-19 @ 12:14), Max: 36.6 (11-12-19 @ 07:50)  HR: 111 (11-12-19 @ 12:14) (76 - 111)  BP: 169/102 (11-12-19 @ 12:14) (146/89 - 169/102)  RR: 24 (11-12-19 @ 12:14) (18 - 24)  SpO2: 99% (11-12-19 @ 12:14) (99% - 100%)    Weight (kg): 76.2 (11-11-19 @ 21:44)      Constitutional: No acute distress.  Eyes:. Conjunctivae are clear, Sclera is non-icteric.  Ears Nose and Throat: The external ears are normal appearing,  Oral mucosa is pink and moist.  Respiratory:  No signs of respiratory distress. Lung sounds are clear bilaterally.  Cardiovascular:  S1 S2, Regular rate and rhythm.  GI: Abdomen is soft, symmetric, and non-tender without distention. There are no visible lesions or scars. Bowel sounds are present and normoactive in all four quadrants. No masses, hepatomegaly, or splenomegaly are noted.   Neuro: No Tremor, No involuntary movements  Skin: No rashes, No Jaundice.          Data: (reviewed by attending)                        11.5   4.06  )-----------( 355      ( 12 Nov 2019 11:55 )             34.6     Hgb Trend:  11.5 11-12-19 @ 11:55  11.5 11-11-19 @ 23:50      11-11    140  |  99  |  32<H>  ----------------------------<  110<H>  4.0   |  24  |  1.7<H>    Ca    9.6      11 Nov 2019 23:50  Mg     2.0     11-11    TPro  7.0  /  Alb  4.1  /  TBili  0.4  /  DBili  x   /  AST  25  /  ALT  14  /  AlkPhos  80  11-11    Liver panel trend:  TBili 0.4   /   AST 25   /   ALT 14   /   AlkP 80   /   Tptn 7.0   /   Alb 4.1    /   DBili --      11-11      PT/INR - ( 11 Nov 2019 23:50 )   PT: 13.90 sec;   INR: 1.21 ratio         PTT - ( 11 Nov 2019 23:50 )  PTT:33.9 sec        Radiology:(reviewed by attending)  CT Abdomen and Pelvis w/ IV Cont:   EXAM:  CT ABDOMEN AND PELVIS IC            PROCEDURE DATE:  11/12/2019            INTERPRETATION:  CLINICAL STATEMENT: Left lower quadrant pain.      TECHNIQUE: Contiguous axial CT images were obtained from the lower chest   to the pubic symphysis following administration of 100cc Optiray 320   intravenous contrast.  Oral contrast was not administered.  Reformatted   images in the coronal and sagittal planes were acquired.    COMPARISON CT: CT abdomen pelvis 11/30/2009.    FINDINGS:    LOWER CHEST: Mild bibasilar subsegmental atelectasis. Cardiomegaly.   Coronary artery and mitral annular calcifications. TAVR.    HEPATOBILIARY: Unremarkable liver. Cholelithiasis without CT evidence for   pericholecystic inflammation. No biliary ductal dilatation.    SPLEEN: Unremarkable.    PANCREAS: Unremarkable.    ADRENAL GLANDS: Unremarkable.    KIDNEYS: Symmetric pattern of renal enhancement. No hydronephrosis.   Presumably right renal cysts although limited in Hounsfield measurements   secondary to streak artifact.    ABDOMINOPELVIC NODES: Unremarkable.    PELVIC ORGANS: Hysterectomy.    PERITONEUM/MESENTERY/BOWEL: Colonic diverticulosis without evidence of   diverticulitis. No evidence of bowel obstruction, free air or ascites.   Normal appendix.    BONES/SOFT TISSUES: Multilevel degenerative changes of the spine. Trace   retrolisthesis of L2 on L3.    OTHER: Tortuous atherosclerotic aorta. IVC filter is present with legs   extending beyond the IVC lumen, one of which abuts the adjacent aortic   wall.      IMPRESSION:     No CT evidence for acute intra-abdominal or pelvic pathology              MAYO SHIELDS M.D., RESIDENT RADIOLOGIST  This document has been electronically signed.  BALBINA NEVILLE M.D., ATTENDING RADIOLOGIST  This document has been electronically signed. Nov 12 2019  5:44AM             (11-12-19 @ 04:03) Gastroenterology Consultation:    Patient is a 85y old  Female who presents with a chief complaint of bloody stools (12 Nov 2019 09:56)      Admitted on: 11-12-19  HPI:  86 yo female patient with PMH of CAD s/p PCI and 7 stents placement (last one in 4/2019), on Plavix daily, DVT/ PE s/p IVC filter AVR and receiving eliquis, presented for one episode of large volume and loose bowel movement with fresh blood that happened yesterday. She has as hx of constipation for which she take stool softeners and mentions seeing in the past small amount fresh blood on the toilet paper, but no hx of large amount of blood with stools She has a hx of black stools because of taking iron supplementation. She also mentioned lower quadrant abdominal pain that has been present for the past 2 months. She has done colonoscopy 2 years ago where 5 polys were removed, no evidence of malignancy. She denied fever, nausea or vomiting.   She also mentioned chest pain, oppressive in nature located at lower left side of the chest, has been present for months for which she is taking sublingual nitroglycerine spray as needed with relief. The pain has not increased in severity or frequency for which she is using around 3 times per week of nitro spray. She has been also experiencing lightheadedness episodes, usually when she stands from sitting position or when walking.     In the ED /89 HR 89 T 96.3.   SCARLET was done and showed hemorrhoids with no signs of bleed.   Hgb 11.5, around her baseline. CAT a/p showed no acute intra abdominal pathology.   EGG showed sinus rhythm with no signs of ischemia. trops were detectable.      Prior records Reviewed (Y/N): Y  History obtained from person other than patient (Y/N): N    Prior EGD: 2012 : No significant finding but she desaturated during procedure.  Prior Colonoscopy: 2013 (Dr. Almaraz), mild L sided diverticulosis, Grade 2 int hemorrhoids.  Enteroscopy 2013: Aborted because she desaturated.  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        PAST MEDICAL & SURGICAL HISTORY:  OA (osteoarthritis)  Dyslipidemia  Lupus  Pulmonary embolism  DVT (deep venous thrombosis)  Coronary artery dilation  Hypertension  S/P partial thyroidectomy  History of total knee replacement, left  S/P aortic valve replacement with prosthetic valve  H/O: hysterectomy      FAMILY HISTORY:  Family history of coronary artery disease (Father, Mother)      Social History:  Tobacco:  Alcohol:  Drugs:    Home Medications:  amLODIPine 5 mg oral tablet: 1 tab(s) orally once a day (12 Nov 2019 09:55)  Eliquis 2.5 mg oral tablet: 1 tab(s) orally 2 times a day (12 Nov 2019 09:55)  ferrous sulfate 325 mg (65 mg elemental iron) oral tablet: 1 tab(s) orally once a day (12 Nov 2019 09:55)  Lasix 20 mg oral tablet: 1 tab(s) orally once a day (12 Nov 2019 09:55)  Lipitor 10 mg oral tablet: 1 tab(s) orally once a day (12 Nov 2019 09:55)  metoprolol succinate 50 mg oral tablet, extended release: 1 tab(s) orally once a day (12 Nov 2019 09:55)  nitroglycerin 0.4 mg sublingual spray: 1 spray(s) sublingual every 5 minutes as needed (12 Nov 2019 09:55)  omeprazole 40 mg oral delayed release capsule: 1 cap(s) orally once a day (12 Nov 2019 09:55)  Plavix 75 mg oral tablet: 1 tab(s) orally every 48 hours (12 Nov 2019 09:55)  ranolazine 500 mg oral tablet, extended release: 1 tab(s) orally once a day (12 Nov 2019 09:55)  telmisartan 20 mg oral tablet: 1 tab(s) orally once a day (12 Nov 2019 09:55)    MEDICATIONS  (STANDING):  amLODIPine   Tablet 5 milliGRAM(s) Oral daily  atorvastatin Oral Tab/Cap - Peds 10 milliGRAM(s) Oral daily  clopidogrel Tablet 75 milliGRAM(s) Oral daily  ferrous sulfate Oral Tab/Cap - Peds 325 milliGRAM(s) Oral daily  heparin  Infusion 1400 Unit(s)/Hr (14 mL/Hr) IV Continuous <Continuous>  metoprolol succinate ER 50 milliGRAM(s) Oral daily  pantoprazole    Tablet 40 milliGRAM(s) Oral before breakfast  sodium chloride 0.9%. 1000 milliLiter(s) (60 mL/Hr) IV Continuous <Continuous>    MEDICATIONS  (PRN):  acetaminophen   Tablet .. 650 milliGRAM(s) Oral every 6 hours PRN Moderate Pain (4 - 6)  morphine  - Injectable 2 milliGRAM(s) IV Push every 5 minutes PRN Chest Pain  morphine  - Injectable 2 milliGRAM(s) IV Push every 6 hours PRN Severe Pain (7 - 10)  nitroglycerin     SubLingual 0.4 milliGRAM(s) SubLingual every 10 minutes PRN Chest Pain      Allergies  No Known Allergies      Review of Systems:   Constitutional:  No Fever, No Chills  ENT/Mouth:  No Hearing Changes,  No Difficulty Swallowing  Eyes:  No Eye Pain, No Vision Changes  Cardiovascular:  No Chest Pain, No Palpitations  Respiratory:  No Cough, No Dyspnea  Gastrointestinal:  As described in HPI  Musculoskeletal:  No Joint Swelling, No Back Pain  Skin:  No Skin Lesions, No Jaundice  Neuro:  No Syncope, No Dizziness  Heme/Lymph:  No Bruising, No Bleeding.          Physical Examination:  T(C): 36.6 (11-12-19 @ 12:14), Max: 36.6 (11-12-19 @ 07:50)  HR: 111 (11-12-19 @ 12:14) (76 - 111)  BP: 169/102 (11-12-19 @ 12:14) (146/89 - 169/102)  RR: 24 (11-12-19 @ 12:14) (18 - 24)  SpO2: 99% (11-12-19 @ 12:14) (99% - 100%)    Weight (kg): 76.2 (11-11-19 @ 21:44)      Constitutional: No acute distress.  Eyes:. Conjunctivae are clear, Sclera is non-icteric.  Ears Nose and Throat: The external ears are normal appearing,  Oral mucosa is pink and moist.  Respiratory:  No signs of respiratory distress. Lung sounds are clear bilaterally.  Cardiovascular:  S1 S2, Regular rate and rhythm.  GI: Abdomen is soft, symmetric, and non-tender without distention. There are no visible lesions or scars. Bowel sounds are present and normoactive in all four quadrants. No masses, hepatomegaly, or splenomegaly are noted.   Neuro: No Tremor, No involuntary movements  Skin: No rashes, No Jaundice.          Data: (reviewed by attending)                        11.5   4.06  )-----------( 355      ( 12 Nov 2019 11:55 )             34.6     Hgb Trend:  11.5 11-12-19 @ 11:55  11.5 11-11-19 @ 23:50      11-11    140  |  99  |  32<H>  ----------------------------<  110<H>  4.0   |  24  |  1.7<H>    Ca    9.6      11 Nov 2019 23:50  Mg     2.0     11-11    TPro  7.0  /  Alb  4.1  /  TBili  0.4  /  DBili  x   /  AST  25  /  ALT  14  /  AlkPhos  80  11-11    Liver panel trend:  TBili 0.4   /   AST 25   /   ALT 14   /   AlkP 80   /   Tptn 7.0   /   Alb 4.1    /   DBili --      11-11      PT/INR - ( 11 Nov 2019 23:50 )   PT: 13.90 sec;   INR: 1.21 ratio         PTT - ( 11 Nov 2019 23:50 )  PTT:33.9 sec        Radiology:(reviewed by attending)  CT Abdomen and Pelvis w/ IV Cont:   EXAM:  CT ABDOMEN AND PELVIS IC            PROCEDURE DATE:  11/12/2019            INTERPRETATION:  CLINICAL STATEMENT: Left lower quadrant pain.      TECHNIQUE: Contiguous axial CT images were obtained from the lower chest   to the pubic symphysis following administration of 100cc Optiray 320   intravenous contrast.  Oral contrast was not administered.  Reformatted   images in the coronal and sagittal planes were acquired.    COMPARISON CT: CT abdomen pelvis 11/30/2009.    FINDINGS:    LOWER CHEST: Mild bibasilar subsegmental atelectasis. Cardiomegaly.   Coronary artery and mitral annular calcifications. TAVR.    HEPATOBILIARY: Unremarkable liver. Cholelithiasis without CT evidence for   pericholecystic inflammation. No biliary ductal dilatation.    SPLEEN: Unremarkable.    PANCREAS: Unremarkable.    ADRENAL GLANDS: Unremarkable.    KIDNEYS: Symmetric pattern of renal enhancement. No hydronephrosis.   Presumably right renal cysts although limited in Hounsfield measurements   secondary to streak artifact.    ABDOMINOPELVIC NODES: Unremarkable.    PELVIC ORGANS: Hysterectomy.    PERITONEUM/MESENTERY/BOWEL: Colonic diverticulosis without evidence of   diverticulitis. No evidence of bowel obstruction, free air or ascites.   Normal appendix.    BONES/SOFT TISSUES: Multilevel degenerative changes of the spine. Trace   retrolisthesis of L2 on L3.    OTHER: Tortuous atherosclerotic aorta. IVC filter is present with legs   extending beyond the IVC lumen, one of which abuts the adjacent aortic   wall.      IMPRESSION:     No CT evidence for acute intra-abdominal or pelvic pathology              MAYO SHIELDS M.D., RESIDENT RADIOLOGIST  This document has been electronically signed.  BALBINA NEVILLE M.D., ATTENDING RADIOLOGIST  This document has been electronically signed. Nov 12 2019  5:44AM             (11-12-19 @ 04:03)

## 2019-11-12 NOTE — H&P ADULT - ASSESSMENT
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    # Hematochezia  - SCARLET done showed non thrombosed hemorrhoids with no signs of active bleed  - Hgb 11.5 ( around her baseline), hemodynamically stable  - CAT a/p showed diverticulosis with no signs of diverticulitis, no acute intra abdominal pathology  - Colonoscopy 2 years ago with 5 polyps removed, no evidence of malignancy  - ddx diverticulosis vs hemorrhoids vs anticoagulation toxicity  - CBC bid  - Active type and screen  - start heparin drip for DVT and AVR  - c/s GI    # Chest pain  - no increase in frequency or severity, still taking the usual dose of sublingual nitro  - ECG showed NSR with no ischemic changes  - trop 0.02, serial trop ordered  - TTE in 7/2018 showed EF 60-65 with moderate LVH  - c/s cardio  - c/w plavix, metoprolol, statins and nitroglycerin as needed    # CONOR  - creat 1.7, baseline 1.2  - likely prerenal in the setting of diarrhea and decrease po intake with lasix and ARB  - s/p 1 L of LR  - CAT a/p showed no hydronephrosis  - hold ARB and lasix  - trend creat  - avoid nephrotoxic drugs    # DVT and PE  - start heparin drip  - monitor ptt    # AVR  - heparin drip  - not sure why she was switched to eliquis from coumadin    # HTN  - c/w amlodipine and metoprolol  - hold ARB and lasix    # HLD  - c/w atorvastatin    # OA  - tylenol prn for mild pain  - morphine for severe pain    # DVT PPx: heparin  # PPI ppx: pantoprazole  # ACTIVITY: increase as tolerated, walks few steps at baseline with help of a cane  # DIET: NPO  # DISPO: from home 84 yo female patient with PMH of HTN, HLD, CAD s/p PCI with 7 stents placed, 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    # Hematochezia  - SCARLET done showed non thrombosed hemorrhoids with no signs of active bleed  - Hgb 11.5 ( around her baseline), hemodynamically stable  - CAT a/p showed diverticulosis with no signs of diverticulitis, no acute intra abdominal pathology  - Colonoscopy 2 years ago with 5 polyps removed, no evidence of malignancy  - ddx diverticulosis vs hemorrhoids vs anticoagulation toxicity  - CBC bid  - Active type and screen  - start heparin drip for DVT and AVR  - c/s GI    # Chest pain  - no increase in frequency or severity, still taking the usual dose of sublingual nitro  - ECG showed NSR with no ischemic changes  - trop 0.02, serial trop ordered  - TTE in 7/2018 showed EF 60-65 with moderate LVH  - c/s cardio  - c/w plavix, metoprolol, statins and nitroglycerin as needed    # CONOR  - creat 1.7, baseline 1.2  - likely prerenal in the setting of diarrhea and decrease po intake with lasix and ARB  - s/p 1 L of LR  - CAT a/p showed no hydronephrosis  - NS at 60 ml/hr  - hold ARB and lasix  - trend creat  - avoid nephrotoxic drugs    # DVT and PE  - start heparin drip  - monitor ptt    # AVR  - heparin drip  - not sure why she was switched to eliquis from coumadin    # HTN  - c/w amlodipine and metoprolol  - hold ARB and lasix    # HLD  - c/w atorvastatin    # OA  - tylenol prn for mild pain  - morphine for severe pain    # DVT PPx: heparin  # PPI ppx: pantoprazole  # ACTIVITY: increase as tolerated, walks few steps at baseline with help of a cane  # DIET: NPO  # DISPO: from home 84 yo female patient with PMH of HTN, HLD, CAD s/p PCI with 7 stents placed, 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    # Hematochezia  - SCARLET done showed non thrombosed hemorrhoids with no signs of active bleed  - Hgb 11.5 ( around her baseline), hemodynamically stable  - CAT a/p showed diverticulosis with no signs of diverticulitis, no acute intra abdominal pathology  - Colonoscopy 2 years ago with 5 polyps removed, no evidence of malignancy  - ddx diverticulosis vs hemorrhoids vs anticoagulation toxicity  - CBC bid  - Active type and screen  - start heparin drip for DVT and AVR  - c/s GI    # Chest pain  - no increase in frequency or severity, still taking the usual dose of sublingual nitro  - ECG showed NSR with no ischemic changes  - trop 0.02 likely due to CONOR, serial trops ordered  - TTE in 7/2018 showed EF 60-65 with moderate LVH  - c/s cardio  - c/w heparin until cardio eval  - c/w plavix, metoprolol, statins and nitroglycerin as needed    # CONOR  - creat 1.7, baseline 1.2  - likely prerenal in the setting of diarrhea and decrease po intake with lasix and ARB  - s/p 1 L of LR  - CAT a/p showed no hydronephrosis  - NS at 60 ml/hr  - hold ARB and lasix  - trend creat  - Urinalysis ordered  - avoid nephrotoxic drugs    # DVT and PE  - start heparin drip  - monitor ptt    # AVR  - heparin drip  - not sure why she was switched to eliquis from coumadin    # HTN  - c/w amlodipine and metoprolol  - hold ARB and lasix    # HLD  - c/w atorvastatin    # OA  - tylenol prn for mild pain  - morphine for severe pain    # DVT PPx: heparin  # PPI ppx: pantoprazole  # ACTIVITY: increase as tolerated, walks few steps at baseline with help of a cane  # DIET: NPO  # DISPO: from home

## 2019-11-12 NOTE — H&P ADULT - HISTORY OF PRESENT ILLNESS
86 yo female patient with PMH of CAD s/p PCI and 7 stents placement (last one in 4/2019), HTN, HLD, DVT and PE s/p IVC filter and receiving eliquis, AVR ( changed to eliquis from coumadin on 4/2019 by Dr Mccabe), OA presented for one episode of large volume and loose bowel movement with fresh blood that happened yesterday. She has as hx of constipation for which she take stool softeners and mentions seeing in the past small amount fresh blood on the toilet paper, but no hx of large amount of blood with stools She has a hx of black stools because of taking iron supplementation. She also mentioned lower quadrant abdominal pain that has been present for the past 2 months. She has done colonoscopy 2 years ago where 5 polys were removed, no evidence of malignancy. She denied fever, nausea or vomiting.   She also mentioned chest pain, oppressive in nature located at lower left side of the chest, has been present for months for which she is taking sublingual nitroglycerine spray as needed with relief. The pain has not increased in severity or frequency for which she is using around 3 times per week of nitro spray. She has been also experiencing lightheadedness episodes, usually when she stands from sitting position or when walking. She has seen her cardiologist last week that has advised her to stop taking ranolazine as it may be causing her symptoms.     In the ED /89 HR 89 T 96.3.   SCARLET was done and showed hemorrhoids with no signs of bleed.   Hgb 11.5, around her baseline. CAT a/p showed no acute intra abdominal pathology.   EGG showed sinus rythm with no signs of ischemia. trops were detectable

## 2019-11-12 NOTE — CONSULT NOTE ADULT - SUBJECTIVE AND OBJECTIVE BOX
Patient was seen and examined by me in Main ER.  EMR reviewed.          Patient is a 85y old  Female who presents with a chief complaint of bloody stools (12 Nov 2019 12:22)      REASON FOR CONSULT     HPI:            House Staff Admission Notes:  84 yo female patient with PMH of CAD s/p PCI and 7 stents placement (last one in 4/2019), HTN, HLD, DVT and PE s/p IVC filter and receiving eliquis, AVR ( changed to eliquis from coumadin on 4/2019 by Dr Mccabe), OA presented for one episode of large volume and loose bowel movement with fresh blood that happened yesterday. She has as hx of constipation for which she take stool softeners and mentions seeing in the past small amount fresh blood on the toilet paper, but no hx of large amount of blood with stools She has a hx of black stools because of taking iron supplementation. She also mentioned lower quadrant abdominal pain that has been present for the past 2 months. She has done colonoscopy 2 years ago where 5 polys were removed, no evidence of malignancy. She denied fever, nausea or vomiting.   She also mentioned chest pain, oppressive in nature located at lower left side of the chest, has been present for months for which she is taking sublingual nitroglycerine spray as needed with relief. The pain has not increased in severity or frequency for which she is using around 3 times per week of nitro spray. She has been also experiencing lightheadedness episodes, usually when she stands from sitting position or when walking. She has seen her cardiologist last week that has advised her to stop taking ranolazine as it may be causing her symptoms.     In the ED /89 HR 89 T 96.3.   SCARLET was done and showed hemorrhoids with no signs of bleed.   Hgb 11.5, around her baseline. CAT a/p showed no acute intra abdominal pathology.   EGG showed sinus rythm with no signs of ischemia. trops were detectable (12 Nov 2019 09:56)      PAST MEDICAL & SURGICAL HISTORY:  OA (osteoarthritis)  Dyslipidemia  Lupus  Pulmonary embolism  DVT (deep venous thrombosis)  Coronary artery dilation  Hypertension  S/P partial thyroidectomy  History of total knee replacement, left  S/P aortic valve replacement with prosthetic valve  H/O: hysterectomy          SOCIAL HISTORY:     FAMILY HISTORY:  Family history of coronary artery disease (Father, Mother)    No Known Allergies      MEDICATIONS  (STANDING):  amLODIPine   Tablet 5 milliGRAM(s) Oral daily  atorvastatin Oral Tab/Cap - Peds 10 milliGRAM(s) Oral daily  clopidogrel Tablet 75 milliGRAM(s) Oral daily  ferrous sulfate Oral Tab/Cap - Peds 325 milliGRAM(s) Oral daily  heparin  Infusion 1400 Unit(s)/Hr (14 mL/Hr) IV Continuous <Continuous>  metoprolol succinate ER 50 milliGRAM(s) Oral daily  pantoprazole    Tablet 40 milliGRAM(s) Oral before breakfast  sodium chloride 0.9%. 1000 milliLiter(s) (60 mL/Hr) IV Continuous <Continuous>    MEDICATIONS  (PRN):  acetaminophen   Tablet .. 650 milliGRAM(s) Oral every 6 hours PRN Moderate Pain (4 - 6)  morphine  - Injectable 2 milliGRAM(s) IV Push every 5 minutes PRN Chest Pain  morphine  - Injectable 2 milliGRAM(s) IV Push every 6 hours PRN Severe Pain (7 - 10)  nitroglycerin     SubLingual 0.4 milliGRAM(s) SubLingual every 10 minutes PRN Chest Pain      Vital Signs Last 24 Hrs  T(C): 36.6 (12 Nov 2019 12:14), Max: 36.6 (12 Nov 2019 07:50)  T(F): 97.9 (12 Nov 2019 12:14), Max: 97.9 (12 Nov 2019 07:50)  HR: 101 (12 Nov 2019 14:26) (76 - 111)  BP: 146/91 (12 Nov 2019 14:26) (146/89 - 169/102)  BP(mean): --  RR: 19 (12 Nov 2019 14:26) (18 - 24)  SpO2: 100% (12 Nov 2019 14:26) (99% - 100%) I&O's Detail    PHYSICAL EXAM:      Constitutional: appears stated age, well developed/nourished, no acute distress    Eyes: EOM's intact.  PERRLA    ENMT: Normocepahic, atraumatic.  Clear oropharynx.  No ear discharge.    Neck: Jugular veins non-distended; no carotid bruits bilaterally.    Breasts: No gross abnormalities noted.    Respiratory: respiratory pattern unlabored; no dullness to percussion; lungs clear to asuculatation bilaterally.    Cardiovascular: Regular rhythm.  S1 and S2 normal.  No murmur nor rub appreciated.    Abdomen: Soft, non-tender.  Normal bowel sounds.    Extremities: extremities warm; no cyanosis, clubbing or edema.    Pulses: Intact bilaterally    Skin: No gross abnormalities noted.    Musculoskeletal: No gross deformities    Neurological: Alert, oriented x 3.  No focal neurologic deficits noted.  REVIEW OF SYSTEMS      CONSTITUTIONAL:  No night sweats.  No fatigue, malaise, lethargy.  No fever or chills.    HEENT:  Eyes:  No visual changes.  No eye pain.  No eye discharge.  ENT:  No runny nose.  No epistaxis.  No sinus pain.  No sore throat.  No odynophagia.  No ear pain.  No congestion.    BREASTS:  No breast pain, soreness, lumps, or discharge.    RESPIRATORY:  No cough.  No wheeze.  No hemoptysis.  No shortness of breath.    CARDIOVASCULAR:  No chest pains.  No palpitations.     GASTROINTESTINAL:  No abdominal pain.  No nausea or vomiting.  No diarrhea or constipation.  No hematemesis.  No hematochezia.  No melena.    GENITOURINARY:  No urgency.  No frequency.  No dysuria.  No hematuria.  No obstructive symptoms.  No discharge.  No pain.  No significant abnormal bleeding.    MUSCULOSKELETAL:  No musculoskeletal pain.  No joint swelling.  No arthritis.    NEUROLOGICAL:    No headache or neck pain.  No syncope or seizure. no weakness . No Vertigo.    SKIN:  No rashes.  No lesions.  No wounds.    ENDOCRINE:  No unexplained weight loss.  No polydipsia.  No polyuria.  No polyphagia.    HEMATOLOGIC:  No anemia.  No purpura.  No petechiae.  No prolonged or excessive bleeding.     ALLERGIC AND IMMUNOLOGIC:  No pruritus.  No swelling.       ECG:  ECHOCARDIOGRAM:  RADIOLOGY & ADDITIONAL STUDIES:      LABS:                        11.5   4.06  )-----------( 355      ( 12 Nov 2019 11:55 )             34.6     11-12    141  |  101  |  23<H>  ----------------------------<  128<H>  3.5   |  23  |  1.1    Ca    9.6      12 Nov 2019 11:55  Mg     2.0     11-11    TPro  7.1  /  Alb  4.2  /  TBili  0.5  /  DBili  x   /  AST  26  /  ALT  14  /  AlkPhos  75  11-12    CARDIAC MARKERS ( 12 Nov 2019 11:55 )  x     / 0.02 ng/mL / 140 U/L / x     / 5.6 ng/mL  CARDIAC MARKERS ( 11 Nov 2019 23:50 )  x     / 0.02 ng/mL / x     / x     / x          PT/INR - ( 11 Nov 2019 23:50 )   PT: 13.90 sec;   INR: 1.21 ratio         PTT - ( 11 Nov 2019 23:50 )  PTT:33.9 sec  I&O's Summary    BNPSerum Pro-Brain Natriuretic Peptide: 798 pg/mL (11-11 @ 23:50)      ASSESMENT AND PLAN Patient was seen and examined by me in Main ER.  EMR reviewed.          Patient is a 85y old  Female who presents with a chief complaint of bloody stools (12 Nov 2019 12:22)      REASON FOR CONSULT     HPI:  Ms. Rachel Damon is an 85-year-old  woman with a past medical history of CAD S/P PTCA/Stent, AS S/P TAVR ([facility] in April 2019), Hypertension, Hyperlipidemia, DVT, PE S/P IVC Filter, and Osteoarthritis.    She presents at [facility] with complaint of bright red blood             House Staff Admission Notes:  84 yo female patient with PMH of CAD s/p PCI and 7 stents placement (last one in 4/2019), HTN, HLD, DVT and PE s/p IVC filter and receiving eliquis, AVR ( changed to eliquis from coumadin on 4/2019 by Dr Mccabe), OA presented for one episode of large volume and loose bowel movement with fresh blood that happened yesterday. She has as hx of constipation for which she take stool softeners and mentions seeing in the past small amount fresh blood on the toilet paper, but no hx of large amount of blood with stools She has a hx of black stools because of taking iron supplementation. She also mentioned lower quadrant abdominal pain that has been present for the past 2 months. She has done colonoscopy 2 years ago where 5 polys were removed, no evidence of malignancy. She denied fever, nausea or vomiting.   She also mentioned chest pain, oppressive in nature located at lower left side of the chest, has been present for months for which she is taking sublingual nitroglycerine spray as needed with relief. The pain has not increased in severity or frequency for which she is using around 3 times per week of nitro spray. She has been also experiencing lightheadedness episodes, usually when she stands from sitting position or when walking. She has seen her cardiologist last week that has advised her to stop taking ranolazine as it may be causing her symptoms.     In the ED /89 HR 89 T 96.3.   SCARLET was done and showed hemorrhoids with no signs of bleed.   Hgb 11.5, around her baseline. CAT a/p showed no acute intra abdominal pathology.   EGG showed sinus rythm with no signs of ischemia. trops were detectable (12 Nov 2019 09:56)      PAST MEDICAL & SURGICAL HISTORY:  OA (osteoarthritis)  Dyslipidemia  Lupus  Pulmonary embolism  DVT (deep venous thrombosis)  Coronary artery dilation  Hypertension  S/P partial thyroidectomy  History of total knee replacement, left  S/P aortic valve replacement with prosthetic valve  H/O: hysterectomy          SOCIAL HISTORY:     FAMILY HISTORY:  Family history of coronary artery disease (Father, Mother)    No Known Allergies      MEDICATIONS  (STANDING):  amLODIPine   Tablet 5 milliGRAM(s) Oral daily  atorvastatin Oral Tab/Cap - Peds 10 milliGRAM(s) Oral daily  clopidogrel Tablet 75 milliGRAM(s) Oral daily  ferrous sulfate Oral Tab/Cap - Peds 325 milliGRAM(s) Oral daily  heparin  Infusion 1400 Unit(s)/Hr (14 mL/Hr) IV Continuous <Continuous>  metoprolol succinate ER 50 milliGRAM(s) Oral daily  pantoprazole    Tablet 40 milliGRAM(s) Oral before breakfast  sodium chloride 0.9%. 1000 milliLiter(s) (60 mL/Hr) IV Continuous <Continuous>    MEDICATIONS  (PRN):  acetaminophen   Tablet .. 650 milliGRAM(s) Oral every 6 hours PRN Moderate Pain (4 - 6)  morphine  - Injectable 2 milliGRAM(s) IV Push every 5 minutes PRN Chest Pain  morphine  - Injectable 2 milliGRAM(s) IV Push every 6 hours PRN Severe Pain (7 - 10)  nitroglycerin     SubLingual 0.4 milliGRAM(s) SubLingual every 10 minutes PRN Chest Pain      Vital Signs Last 24 Hrs  T(C): 36.6 (12 Nov 2019 12:14), Max: 36.6 (12 Nov 2019 07:50)  T(F): 97.9 (12 Nov 2019 12:14), Max: 97.9 (12 Nov 2019 07:50)  HR: 101 (12 Nov 2019 14:26) (76 - 111)  BP: 146/91 (12 Nov 2019 14:26) (146/89 - 169/102)  BP(mean): --  RR: 19 (12 Nov 2019 14:26) (18 - 24)  SpO2: 100% (12 Nov 2019 14:26) (99% - 100%) I&O's Detail    PHYSICAL EXAM:      Constitutional: appears stated age, well developed/nourished, no acute distress    Eyes: EOM's intact.  PERRLA    ENMT: Normocepahic, atraumatic.  Clear oropharynx.  No ear discharge.    Neck: Jugular veins non-distended; no carotid bruits bilaterally.    Breasts: No gross abnormalities noted.    Respiratory: respiratory pattern unlabored; no dullness to percussion; lungs clear to asuculatation bilaterally.    Cardiovascular: Regular rhythm.  S1 and S2 normal.  No murmur nor rub appreciated.    Abdomen: Soft, non-tender.  Normal bowel sounds.    Extremities: extremities warm; no cyanosis, clubbing or edema.    Pulses: Intact bilaterally    Skin: No gross abnormalities noted.    Musculoskeletal: No gross deformities    Neurological: Alert, oriented x 3.  No focal neurologic deficits noted.  REVIEW OF SYSTEMS      CONSTITUTIONAL:  No night sweats.  No fatigue, malaise, lethargy.  No fever or chills.    HEENT:  Eyes:  No visual changes.  No eye pain.  No eye discharge.  ENT:  No runny nose.  No epistaxis.  No sinus pain.  No sore throat.  No odynophagia.  No ear pain.  No congestion.    BREASTS:  No breast pain, soreness, lumps, or discharge.    RESPIRATORY:  No cough.  No wheeze.  No hemoptysis.  No shortness of breath.    CARDIOVASCULAR:  No chest pains.  No palpitations.     GASTROINTESTINAL:  No abdominal pain.  No nausea or vomiting.  No diarrhea or constipation.  No hematemesis.  No hematochezia.  No melena.    GENITOURINARY:  No urgency.  No frequency.  No dysuria.  No hematuria.  No obstructive symptoms.  No discharge.  No pain.  No significant abnormal bleeding.    MUSCULOSKELETAL:  No musculoskeletal pain.  No joint swelling.  No arthritis.    NEUROLOGICAL:    No headache or neck pain.  No syncope or seizure. no weakness . No Vertigo.    SKIN:  No rashes.  No lesions.  No wounds.    ENDOCRINE:  No unexplained weight loss.  No polydipsia.  No polyuria.  No polyphagia.    HEMATOLOGIC:  No anemia.  No purpura.  No petechiae.  No prolonged or excessive bleeding.     ALLERGIC AND IMMUNOLOGIC:  No pruritus.  No swelling.       ECG:  ECHOCARDIOGRAM:  RADIOLOGY & ADDITIONAL STUDIES:      LABS:                        11.5   4.06  )-----------( 355      ( 12 Nov 2019 11:55 )             34.6     11-12    141  |  101  |  23<H>  ----------------------------<  128<H>  3.5   |  23  |  1.1    Ca    9.6      12 Nov 2019 11:55  Mg     2.0     11-11    TPro  7.1  /  Alb  4.2  /  TBili  0.5  /  DBili  x   /  AST  26  /  ALT  14  /  AlkPhos  75  11-12    CARDIAC MARKERS ( 12 Nov 2019 11:55 )  x     / 0.02 ng/mL / 140 U/L / x     / 5.6 ng/mL  CARDIAC MARKERS ( 11 Nov 2019 23:50 )  x     / 0.02 ng/mL / x     / x     / x          PT/INR - ( 11 Nov 2019 23:50 )   PT: 13.90 sec;   INR: 1.21 ratio         PTT - ( 11 Nov 2019 23:50 )  PTT:33.9 sec  I&O's Summary    BNPSerum Pro-Brain Natriuretic Peptide: 798 pg/mL (11-11 @ 23:50)      ASSESMENT AND PLAN Patient was seen and examined by me in Main ER.  EMR reviewed.          Patient is a 85y old  Female who presents with a chief complaint of bloody stools (12 Nov 2019 12:22)      REASON FOR CONSULT     HPI:  Ms. Rachel Damon is an 85-year-old  woman with a past medical history of CAD S/P PTCA/Stent, AS S/P TAVR ([facility] in April 2019), Hypertension, Hyperlipidemia, DVT, PE S/P IVC Filter, and Osteoarthritis.    She presents at [facility] with complaint of bright red blood per rectum yesterday.  She also states that she has been using her SL nitro spray for her chest discomfort for the past three days.   In ER, she is comfortable in bed.  She offers no new complaints.   She is reluctant to have any colonoscopy because of issues she had about three years ago when she underwent a colonoscopu.  ___________________________________     House Staff Admission Notes:  86 yo female patient with PMH of CAD s/p PCI and 7 stents placement (last one in 4/2019), HTN, HLD, DVT and PE s/p IVC filter and receiving eliquis, AVR ( changed to eliquis from coumadin on 4/2019 by Dr Mccabe), OA presented for one episode of large volume and loose bowel movement with fresh blood that happened yesterday. She has as hx of constipation for which she take stool softeners and mentions seeing in the past small amount fresh blood on the toilet paper, but no hx of large amount of blood with stools She has a hx of black stools because of taking iron supplementation. She also mentioned lower quadrant abdominal pain that has been present for the past 2 months. She has done colonoscopy 2 years ago where 5 polys were removed, no evidence of malignancy. She denied fever, nausea or vomiting.   She also mentioned chest pain, oppressive in nature located at lower left side of the chest, has been present for months for which she is taking sublingual nitroglycerine spray as needed with relief. The pain has not increased in severity or frequency for which she is using around 3 times per week of nitro spray. She has been also experiencing lightheadedness episodes, usually when she stands from sitting position or when walking. She has seen her cardiologist last week that has advised her to stop taking ranolazine as it may be causing her symptoms.     In the ED /89 HR 89 T 96.3.   SCARLET was done and showed hemorrhoids with no signs of bleed.   Hgb 11.5, around her baseline. CAT a/p showed no acute intra abdominal pathology.   EGG showed sinus rythm with no signs of ischemia. trops were detectable (12 Nov 2019 09:56)      PAST MEDICAL & SURGICAL HISTORY:  OA (osteoarthritis)  Dyslipidemia  Lupus  Pulmonary embolism  DVT (deep venous thrombosis)  Coronary artery dilation  Hypertension  S/P partial thyroidectomy  History of total knee replacement, left  S/P aortic valve replacement with prosthetic valve  H/O: hysterectomy    SOCIAL HISTORY:  Denies smoking.  Denies alcohol.  Lives in private home alone but son lives in same house on first floor; patient occupies second floor.    FAMILY HISTORY:  Family history of coronary artery disease (Father, Mother)    No Known Allergies      MEDICATIONS  (STANDING):  amLODIPine   Tablet 5 milliGRAM(s) Oral daily  atorvastatin Oral Tab/Cap - Peds 10 milliGRAM(s) Oral daily  clopidogrel Tablet 75 milliGRAM(s) Oral daily  ferrous sulfate Oral Tab/Cap - Peds 325 milliGRAM(s) Oral daily  heparin  Infusion 1400 Unit(s)/Hr (14 mL/Hr) IV Continuous <Continuous>  metoprolol succinate ER 50 milliGRAM(s) Oral daily  pantoprazole    Tablet 40 milliGRAM(s) Oral before breakfast  sodium chloride 0.9%. 1000 milliLiter(s) (60 mL/Hr) IV Continuous <Continuous>    MEDICATIONS  (PRN):  acetaminophen   Tablet .. 650 milliGRAM(s) Oral every 6 hours PRN Moderate Pain (4 - 6)  morphine  - Injectable 2 milliGRAM(s) IV Push every 5 minutes PRN Chest Pain  morphine  - Injectable 2 milliGRAM(s) IV Push every 6 hours PRN Severe Pain (7 - 10)  nitroglycerin     SubLingual 0.4 milliGRAM(s) SubLingual every 10 minutes PRN Chest Pain      Vital Signs Last 24 Hrs  T(C): 36.6 (12 Nov 2019 12:14), Max: 36.6 (12 Nov 2019 07:50)  T(F): 97.9 (12 Nov 2019 12:14), Max: 97.9 (12 Nov 2019 07:50)  HR: 101 (12 Nov 2019 14:26) (76 - 111)  BP: 146/91 (12 Nov 2019 14:26) (146/89 - 169/102)  BP(mean): --  RR: 19 (12 Nov 2019 14:26) (18 - 24)  SpO2: 100% (12 Nov 2019 14:26) (99% - 100%) I&O's Detail    PHYSICAL EXAM:  Not in distress  Normocephalic; atraumatic  Alert, oriented x 3  No JVD; regular rhythm; nl S1S2  Bilateral breath sounds  Abdomen soft  No edema  No focal neurologic deficits      REVIEW OF SYSTEMS  CONSTITUTIONAL:   No fever or chills.  HEENT:  Eyes:  No visual changes.  No eye pain.  No eye discharge.  ENT:  No runny nose.  No epistaxis.  No sinus pain.   RESPIRATORY:  No cough.  No wheeze.  No hemoptysis.  No shortness of breath.  CARDIOVASCULAR:  Episodes of chest pain relived with SL nitro spray for past three days.  GASTROINTESTINAL:  Episode of bright red blood per rectum yesterday  GENITOURINARY:  No urgency.  No frequency.  No dysuria.  No hematuria.  No obstructive symptoms.   MUSCULOSKELETAL:  No joint swelling.    NEUROLOGICAL:     No syncope or seizure.  SKIN:  No rashes.  No lesions.  No wounds.  ENDOCRINE:  No unexplained weight loss.    HEMATOLOGIC:  No anemia.  No purpura.  No petechiae.    ALLERGIC AND IMMUNOLOGIC:  No pruritus.      ECG: Sinus Rhythm    ECHOCARDIOGRAM:  < from: Transthoracic Echocardiogram (07.23.18 @ 11:18) >  Summary:   1. Left ventricular ejection fraction, by visual estimation, is 60 to   65%.   2. Normal global left ventricular systolic function.   3. Moderate concentric left ventricular hypertrophy.   4. Moderate to severe mitral annular calcification.   5. Thickening and calcification of the anterior and posterior mitral   valve leaflets.   6. TAVR.   7. No perivalvular regurgitation.    PHYSICIAN INTERPRETATION:  Left Ventricle: The left ventricular internal cavity size is normal.   There is moderate concentric left ventricular hypertrophy. Global LV   systolic function was normal. Left ventricular ejection fraction, by   visual estimation, is 60 to 65%.  Right Ventricle: Normal right ventricular size and function.  Left Atrium: Moderately enlarged left atrium.  Right Atrium: Normal right atrial size.  Pericardium: There is no evidence of pericardial effusion.  Mitral Valve: No evidence of mitral valve regurgitation. Thickening and   calcification of the anterior and posterior mitral valve leaflets. There   is moderate to severe mitral annular calcification. No evidence of mitral   valve stenosis.  Tricuspid Valve: Structurally normal tricuspid valve, with normal leaflet   excursion. No tricuspid regurgitation is visualized.  Aortic Valve: Peak AV velocity is 2.27 m/s, mean transaortic gradient   equals 9.0 mmHg. TAVR.  Pulmonic Valve: The pulmonic valve was not well visualized. Mild pulmonic   valve regurgitation.  Aorta: Aortic root measured at sinotubular junction is normal.  Venous: The inferior vena cava was normal sized, with respiratory size   variation greater than 50%.    < end of copied text >      RADIOLOGY & ADDITIONAL STUDIES:  CT  < from: CT Abdomen and Pelvis w/ IV Cont (11.12.19 @ 04:03) >  FINDINGS:    LOWER CHEST: Mild bibasilar subsegmental atelectasis. Cardiomegaly.   Coronary artery and mitral annular calcifications. TAVR.    HEPATOBILIARY: Unremarkable liver. Cholelithiasis without CT evidence for   pericholecystic inflammation. No biliary ductal dilatation.    SPLEEN: Unremarkable.    PANCREAS: Unremarkable.    ADRENAL GLANDS: Unremarkable.    KIDNEYS: Symmetric pattern of renal enhancement. No hydronephrosis.   Presumably right renal cysts although limited in Hounsfield measurements   secondary to streak artifact.    ABDOMINOPELVIC NODES: Unremarkable.    PELVIC ORGANS: Hysterectomy.    PERITONEUM/MESENTERY/BOWEL: Colonic diverticulosis without evidence of   diverticulitis. No evidence of bowel obstruction, free air or ascites.   Normal appendix.    BONES/SOFT TISSUES: Multilevel degenerative changes of the spine. Trace   retrolisthesis of L2 on L3.    OTHER: Tortuous atherosclerotic aorta. IVC filter is present with legs   extending beyond the IVC lumen, one of which abuts the adjacent aortic   wall.      IMPRESSION:     No CT evidence for acute intra-abdominal or pelvic pathology    < end of copied text >      CXR  < from: Xray Chest 1 View-PORTABLE IMMEDIATE (11.11.19 @ 23:41) >  FINDINGS:    Supportdevices: None.    Cardiac/mediastinum/hilum: Stable.    Lung parenchyma/Pleura: No focal consolidation. There is no pleural   effusion. There is no pneumothorax.    Skeleton/soft tissues: Stable.    IMPRESSION:     No radiographic evidence of acute cardiopulmonary disease.      < end of copied text >          LABS:                        11.5   4.06  )-----------( 355      ( 12 Nov 2019 11:55 )             34.6     11-12    141  |  101  |  23<H>  ----------------------------<  128<H>  3.5   |  23  |  1.1    Ca    9.6      12 Nov 2019 11:55  Mg     2.0     11-11    TPro  7.1  /  Alb  4.2  /  TBili  0.5  /  DBili  x   /  AST  26  /  ALT  14  /  AlkPhos  75  11-12    CARDIAC MARKERS ( 12 Nov 2019 11:55 )  x     / 0.02 ng/mL / 140 U/L / x     / 5.6 ng/mL  CARDIAC MARKERS ( 11 Nov 2019 23:50 )  x     / 0.02 ng/mL / x     / x     / x          PT/INR - ( 11 Nov 2019 23:50 )   PT: 13.90 sec;   INR: 1.21 ratio         PTT - ( 11 Nov 2019 23:50 )  PTT:33.9 sec  I&O's Summary    BNPSerum Pro-Brain Natriuretic Peptide: 798 pg/mL (11-11 @ 23:50)

## 2019-11-12 NOTE — H&P ADULT - NSICDXPASTSURGICALHX_GEN_ALL_CORE_FT
PAST SURGICAL HISTORY:  H/O: hysterectomy     History of total knee replacement, left     S/P aortic valve replacement with prosthetic valve     S/P partial thyroidectomy

## 2019-11-12 NOTE — CHART NOTE - NSCHARTNOTEFT_GEN_A_CORE
Called by Dr. Manning, will accept patient to his service. He requested a cardiology and GI consult, and will see patient tomorrow.    RAMAN Pagan. [id number]

## 2019-11-12 NOTE — CONSULT NOTE ADULT - ASSESSMENT
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    # Painless Hematochezia: DD includes Diverticular disease vs Hemorrhoidal vs AVM vs colon cancer: resolved  hemodynamically stable  No drop in Hg    Rec:  Maintain Active Type and Screen   2 large bore 18 gauge IV lines  Trend Hb and Keep it > 8, transfuse PRBC if necessary  patient desaturated 4 years back twice during endoscopy and the procedure was aborted.   Patient is not actively bleeding and is a high risk from cardiopulmonary standpoint for any endoscopic intervention.  If patient rebleeds please get  CTA. 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    # Painless Hematochezia: DD includes Diverticular disease vs Hemorrhoidal vs AVM vs colon cancer: resolved  hemodynamically stable  No drop in Hg    Rec:  Maintain Active Type and Screen   2 large bore 18 gauge IV lines  Trend Hb and Keep it > 8, transfuse PRBC if necessary  patient desaturated 4 years back twice during endoscopy and the procedure was aborted.   Patient is not actively bleeding and is a high risk from cardiopulmonary standpoint for any endoscopic intervention and actively having chest pain. So please call cardiology.  If patient rebleeds please get  CTA.

## 2019-11-12 NOTE — ED ADULT NURSE REASSESSMENT NOTE - NS ED NURSE REASSESS COMMENT FT1
pt a&ox4, pt requested morphine for pain to b/l shoulders and neck, morphine given with relief. pt admitted at this time to telemetry. pt denies CP/SOB.

## 2019-11-12 NOTE — H&P ADULT - NSICDXFAMILYHX_GEN_ALL_CORE_FT
FAMILY HISTORY:  Father  Still living? No  Family history of coronary artery disease, Age at diagnosis: Age Unknown    Mother  Still living? Unknown  Family history of coronary artery disease, Age at diagnosis: Age Unknown

## 2019-11-12 NOTE — CONSULT NOTE ADULT - ASSESSMENT
1] Anginal Syndrome      CAD S/P PTCA/Stent 1] Anginal Syndrome      CAD S/P PTCA/Stent  - Continue Plavix 75 mg tablet daily  - Start Aspirin 81 mg tablet daily while patient is off OAC  - Start Ranexa 500 mg tablet q 12 hrs.    2] AS S/P TAVR  - no need for repeat echo    3] Hypertension  - Continue Metoprolol and Amlodipine at prescribed dose    4] Hyperlipidemia  - Continue statin therapy    5] PE S/P IVC Filter Placement  - Off OAC in view of possible LGIB    6] LGIB  - GI input appreciated  - Follow CBC    DVT prophylaxis  Keep on Telemetry    Sky Banks MD (covering for Dr. Kurt Wilcox and Dr. Maxx George)  [phone] Office

## 2019-11-13 LAB
ALBUMIN SERPL ELPH-MCNC: 3.8 G/DL — SIGNIFICANT CHANGE UP (ref 3.5–5.2)
ALP SERPL-CCNC: 69 U/L — SIGNIFICANT CHANGE UP (ref 30–115)
ALT FLD-CCNC: 14 U/L — SIGNIFICANT CHANGE UP (ref 0–41)
ANION GAP SERPL CALC-SCNC: 19 MMOL/L — HIGH (ref 7–14)
APTT BLD: >200 SEC — CRITICAL HIGH (ref 27–39.2)
APTT BLD: >200 SEC — CRITICAL HIGH (ref 27–39.2)
AST SERPL-CCNC: 29 U/L — SIGNIFICANT CHANGE UP (ref 0–41)
BASOPHILS # BLD AUTO: 0.02 K/UL — SIGNIFICANT CHANGE UP (ref 0–0.2)
BASOPHILS NFR BLD AUTO: 0.5 % — SIGNIFICANT CHANGE UP (ref 0–1)
BILIRUB SERPL-MCNC: 0.6 MG/DL — SIGNIFICANT CHANGE UP (ref 0.2–1.2)
BUN SERPL-MCNC: 10 MG/DL — SIGNIFICANT CHANGE UP (ref 10–20)
CALCIUM SERPL-MCNC: 9.3 MG/DL — SIGNIFICANT CHANGE UP (ref 8.5–10.1)
CHLORIDE SERPL-SCNC: 102 MMOL/L — SIGNIFICANT CHANGE UP (ref 98–110)
CO2 SERPL-SCNC: 20 MMOL/L — SIGNIFICANT CHANGE UP (ref 17–32)
CREAT SERPL-MCNC: 0.8 MG/DL — SIGNIFICANT CHANGE UP (ref 0.7–1.5)
EOSINOPHIL # BLD AUTO: 0.06 K/UL — SIGNIFICANT CHANGE UP (ref 0–0.7)
EOSINOPHIL NFR BLD AUTO: 1.5 % — SIGNIFICANT CHANGE UP (ref 0–8)
GLUCOSE SERPL-MCNC: 108 MG/DL — HIGH (ref 70–99)
HCT VFR BLD CALC: 34.9 % — LOW (ref 37–47)
HGB BLD-MCNC: 11.4 G/DL — LOW (ref 12–16)
IMM GRANULOCYTES NFR BLD AUTO: 0.2 % — SIGNIFICANT CHANGE UP (ref 0.1–0.3)
LYMPHOCYTES # BLD AUTO: 1.35 K/UL — SIGNIFICANT CHANGE UP (ref 1.2–3.4)
LYMPHOCYTES # BLD AUTO: 33.5 % — SIGNIFICANT CHANGE UP (ref 20.5–51.1)
MCHC RBC-ENTMCNC: 29.7 PG — SIGNIFICANT CHANGE UP (ref 27–31)
MCHC RBC-ENTMCNC: 32.7 G/DL — SIGNIFICANT CHANGE UP (ref 32–37)
MCV RBC AUTO: 90.9 FL — SIGNIFICANT CHANGE UP (ref 81–99)
MONOCYTES # BLD AUTO: 0.36 K/UL — SIGNIFICANT CHANGE UP (ref 0.1–0.6)
MONOCYTES NFR BLD AUTO: 8.9 % — SIGNIFICANT CHANGE UP (ref 1.7–9.3)
NEUTROPHILS # BLD AUTO: 2.23 K/UL — SIGNIFICANT CHANGE UP (ref 1.4–6.5)
NEUTROPHILS NFR BLD AUTO: 55.4 % — SIGNIFICANT CHANGE UP (ref 42.2–75.2)
NRBC # BLD: 0 /100 WBCS — SIGNIFICANT CHANGE UP (ref 0–0)
PLATELET # BLD AUTO: 357 K/UL — SIGNIFICANT CHANGE UP (ref 130–400)
POTASSIUM SERPL-MCNC: 3.8 MMOL/L — SIGNIFICANT CHANGE UP (ref 3.5–5)
POTASSIUM SERPL-SCNC: 3.8 MMOL/L — SIGNIFICANT CHANGE UP (ref 3.5–5)
PROT SERPL-MCNC: 6.6 G/DL — SIGNIFICANT CHANGE UP (ref 6–8)
RBC # BLD: 3.84 M/UL — LOW (ref 4.2–5.4)
RBC # FLD: 14.6 % — HIGH (ref 11.5–14.5)
SODIUM SERPL-SCNC: 141 MMOL/L — SIGNIFICANT CHANGE UP (ref 135–146)
TROPONIN T SERPL-MCNC: 0.02 NG/ML — HIGH
WBC # BLD: 4.03 K/UL — LOW (ref 4.8–10.8)
WBC # FLD AUTO: 4.03 K/UL — LOW (ref 4.8–10.8)

## 2019-11-13 PROCEDURE — 99222 1ST HOSP IP/OBS MODERATE 55: CPT

## 2019-11-13 RX ORDER — APIXABAN 2.5 MG/1
2.5 TABLET, FILM COATED ORAL
Refills: 0 | Status: DISCONTINUED | OUTPATIENT
Start: 2019-11-13 | End: 2019-11-14

## 2019-11-13 RX ORDER — ASPIRIN/CALCIUM CARB/MAGNESIUM 324 MG
81 TABLET ORAL DAILY
Refills: 0 | Status: DISCONTINUED | OUTPATIENT
Start: 2019-11-13 | End: 2019-11-13

## 2019-11-13 RX ORDER — RANOLAZINE 500 MG/1
500 TABLET, FILM COATED, EXTENDED RELEASE ORAL
Refills: 0 | Status: DISCONTINUED | OUTPATIENT
Start: 2019-11-13 | End: 2019-11-14

## 2019-11-13 RX ADMIN — Medication 325 MILLIGRAM(S): at 11:45

## 2019-11-13 RX ADMIN — PANTOPRAZOLE SODIUM 40 MILLIGRAM(S): 20 TABLET, DELAYED RELEASE ORAL at 05:20

## 2019-11-13 RX ADMIN — Medication 50 MILLIGRAM(S): at 05:20

## 2019-11-13 RX ADMIN — APIXABAN 2.5 MILLIGRAM(S): 2.5 TABLET, FILM COATED ORAL at 18:51

## 2019-11-13 RX ADMIN — AMLODIPINE BESYLATE 5 MILLIGRAM(S): 2.5 TABLET ORAL at 05:20

## 2019-11-13 RX ADMIN — CLOPIDOGREL BISULFATE 75 MILLIGRAM(S): 75 TABLET, FILM COATED ORAL at 11:48

## 2019-11-13 RX ADMIN — ATORVASTATIN CALCIUM 10 MILLIGRAM(S): 80 TABLET, FILM COATED ORAL at 11:45

## 2019-11-13 NOTE — PROGRESS NOTE ADULT - SUBJECTIVE AND OBJECTIVE BOX
DAILY PROGRESS NOTE  ===========================================================    Patient Information:  GEMMA GOMEZ  /  85y  /  Female  /  MRN#: [id number]    Hospital Day: 1d     |:::::::::::::::::::::::::::| SUBJECTIVE |:::::::::::::::::::::::::::|    OVERNIGHT EVENTS: None reported.  TODAY: Patient was seen today at bedside. She feels well and no complaint of recurrent GI bleed. She remains asymptomatic at this time and CP has resolved. ROS is otherwise negative.    |:::::::::::::::::::::::::::| OBJECTIVE |:::::::::::::::::::::::::::|    VITAL SIGNS: Last 24 Hours  T(F): 98.3 (2019 09:00), Max: 98.5 (2019 01:08)  HR: 68 (2019 09:00) (68 - 111)  BP: 153/86 (2019 05:17) (125/75 - 169/102)  RR: 18 (2019 09:00) (18 - 24)  SpO2: 99% (2019 09:00) (99% - 100%)    19 @ 07:01  -  19 @ 07:00  --------------------------------------------------------  IN: 793 mL / OUT: 600 mL / NET: 193 mL    PHYSICAL EXAM:  GENERAL:   Awake, alert; NAD.  HEENT:  Head NC/AT; Conjunctivae pink, Sclera anicteric; Oral mucosa dry.  CARDIO:   Regular rate; Regular rhythm; S1 & S2; Murmur.  RESP:   No rales or rhonchi appreciated.  GI:   Soft; Mild tenderness in lower quadrants/ND; BS; No guarding; No rebound tenderness.  EXT:   No edema in LE.  NEURO:   PERRL.  SKIN:   Intact.    LAB RESULTS:                        11.4   4.03  )-----------( 357      ( 2019 07:46 )             34.9    141  |  102  |  10  ----------------------------<  108<H>  3.8   |  20  |  0.8    Ca    9.3    Mg     2.0         TPro  6.6  /  Alb  3.8  /  TBili  0.6  /  DBili  x   /  AST  29  /  ALT  14  /  AlkPhos  69     PT/INR - ( 2019 23:50 )   PT: 13.90 sec;   INR: 1.21 ratio    PTT - ( 2019 07:46 )  PTT:>200.0 sec    Urinalysis Basic - ( 2019 12:20 )  Color: Light Yellow / Appearance: Clear / S.035 / pH: x  Gluc: x / Ketone: Negative  / Bili: Negative / Urobili: <2 mg/dL   Blood: x / Protein: Trace / Nitrite: Negative   Leuk Esterase: Negative / RBC: 10 /HPF / WBC 1 /HPF   Sq Epi: x / Non Sq Epi: 2 /HPF / Bacteria: Few    Troponin T, Serum: 0.04 ng/mL <HH> (19 @ 20:35)  Creatine Kinase, Serum: 140 U/L (19 @ 11:55)  Troponin T, Serum: 0.02 ng/mL <H> (19 @ 11:55)    CARDIAC MARKERS ( 2019 20:35 )  x     / 0.04 ng/mL / x     / x     / x      CARDIAC MARKERS ( 2019 11:55 )  x     / 0.02 ng/mL / 140 U/L / x     / 5.6 ng/mL  CARDIAC MARKERS ( 2019 23:50 )  x     / 0.02 ng/mL / x     / x     / x        MICROBIOLOGY:  NTR    RADIOLOGY:  CT Abdomen and Pelvis w/ IV Cont (19 @ 04:03)   OTHER: Tortuous atherosclerotic aorta. IVC filter is present with legs extending beyond the IVC lumen, one of which abuts the adjacent aortic wall.    IMPRESSION:   No CT evidence for acute intra-abdominal or pelvic pathology    ALLERGIES:  No Known Allergies    HOME MEDICATIONS:  amLODIPine 5 mg oral tablet: 1 tab(s) orally once a day (:)  Eliquis 2.5 mg oral tablet: 1 tab(s) orally 2 times a day (:)  ferrous sulfate 325 mg (65 mg elemental iron) oral tablet: 1 tab(s) orally once a day (:55)  Lasix 20 mg oral tablet: 1 tab(s) orally once a day (:)  Lipitor 10 mg oral tablet: 1 tab(s) orally once a day (:)  metoprolol succinate 50 mg oral tablet, extended release: 1 tab(s) orally once a day (:)  nitroglycerin 0.4 mg sublingual spray: 1 spray(s) sublingual every 5 minutes as needed (:)  omeprazole 40 mg oral delayed release capsule: 1 cap(s) orally once a day (:)  Plavix 75 mg oral tablet: 1 tab(s) orally every 48 hours (:)  ranolazine 500 mg oral tablet, extended release: 1 tab(s) orally once a day (:)  telmisartan 20 mg oral tablet: 1 tab(s) orally once a day (:)    ===========================================================

## 2019-11-13 NOTE — PROGRESS NOTE ADULT - ASSESSMENT
Painless hematochezia  - Patient experienced just one episode of large, bloody BM; usually only small with wiping  - SCARLET in the ED was negative; Noted hemorrhois  - Hgb has remained stable since admission  - Had a colonoscopy 2 years ago that was negative except for multiple benign polyps  - GI evaluation appreciated; Will monitor Hgb and CTA A/P if bleed recurs; No scopes inpatient; Resume diet and AC  - CT A+P without evidence of bleed  - Maintain T+S; Resume diet and advance as tolerated  - Can follow up with PMD as OP    Abdominal pain; Possibly due to IVC filter  - Unable to be removed in past with complication in procedure  - The pain is primaryily in the lower quadrants on light palpation; no rebound  - CT A+P shows IVC filter with legs beyond IVC lumen and 1 leg that abut the aortic wall  - Follow up with radiology; Unsure if intervention is indicated    Chest pain; ?Unstable angina; Hx of CAD/Stents  - Pain chronic and resolved with Nitro  - EKG without any acute or concerning changes  - Troponin elevated; possibly from CONOR; Trending  - No need for repeat TTE  - Continue home medications  - Started Ranolazine  - Cardiology appreciated; Follow up as OP    Acute Kidney Injury  - Patient was on ARB and Lasix; Holding  - Resolving CONOR; may resume on discharge with OP follow up blood work    Hx of DVT/PE s/p IVC filter; Hx of AVR: Resume Eliquis  Hx of HLD and HTN: Continue home medications; holding ARB and Lasix  Hx of OA: Pain control

## 2019-11-13 NOTE — CONSULT NOTE ADULT - ASSESSMENT
ASSESSMENT:  84 yo female patient with PMH of CAD s/p PCI and 7 stents placement (last one in 4/2019), HTN, HLD, DVT and PE s/p IVC filter and receiving eliquis, AVR ( changed to eliquis from coumadin) presenting to the ED with abdominal pain. CT showing IVC filter is present with legs extending beyond the IVC lumen, one of which abuts the adjacent aortic wall. Vascular surgery consulted for evaluation of filter is setting of abdominal pain. Her filter has been present for approximately 10 years, with unsuccessful attempted removal, currently appears to have eroded through the IVC.    PLAN:   No acute vascular intervention  Recommend continue therapeutic lifelong anticoagulation  Follow up in office with Dr. Ann  Plan discussed with Dr. Ann

## 2019-11-13 NOTE — PROGRESS NOTE ADULT - ASSESSMENT
Anginal Syndrome with peak Trop 0.04, no MI.      CAD S/P PTCA/Stent  May d/c Heparin drip.  - Continue Plavix 75 mg tablet daily  - Start Aspirin 81 mg tablet daily while patient is off OAC  - Start Ranexa 500 mg tablet q 12 hrs.    AS - stable S/P TAVR  - no need for repeat echo     Hypertension  - Continue Metoprolol and Amlodipine at prescribed dose     Hyperlipidemia  - Continue statin therapy     P.E S/P IVC Filter Placement  - Off OAC in view of possible LGIB     LGIB - GI input appreciated  - Follow CBC    DVT prophylaxis   Telemetry Anginal Syndrome with peak Trop 0.04, no MI.      CAD S/P PTCA/Stent  May d/c Heparin drip.  - Continue Plavix 75 mg tablet daily  -  Aspirin 81 mg tablet daily   -  Ranexa 500 mg tablet q 12 hrs.    AS - stable S/P TAVR  - no need for repeat echo     Hypertension  - Continue Metoprolol and Amlodipine.     Hyperlipidemia  - Continue statin therapy     P.E S/P IVC Filter Placement  - Off OAC in view of possible LGIB     LGIB - GI input appreciated  - Follow CBC    DVT prophylaxis   Telemetry

## 2019-11-13 NOTE — CONSULT NOTE ADULT - SUBJECTIVE AND OBJECTIVE BOX
Vascular Surgery Consultation Note  =====================================================    HPI: 85y Female  HPI:  86 yo female patient with PMH of CAD s/p PCI and 7 stents placement (last one in 2019), HTN, HLD, DVT and PE s/p IVC filter and receiving eliquis, AVR ( changed to eliquis from coumadin on 2019 by Dr Mccabe), OA presented for one episode of large volume and loose bowel movement with fresh blood that happened yesterday. She has as hx of constipation for which she take stool softeners and mentions seeing in the past small amount fresh blood on the toilet paper, but no hx of large amount of blood with stools She has a hx of black stools because of taking iron supplementation. She also mentioned lower quadrant abdominal pain that has been present for the past 2 months. She has done colonoscopy 2 years ago where 5 polys were removed, no evidence of malignancy. She denied fever, nausea or vomiting.     Vascular: She has a history of thrombophilia, lupus anticoagulant, hx of DVT/PE s/o left total knee replacement, on lifelong eliquis / plavix. Her IVC filter was placed in  by Dr. Mccoy ([facility]) for her hx of DVT/PE and GI bleed. She states that the surgeon has tried to remove it but was unsuccessful so he had to leave it in. She complains of abdominal pain that is epigastric that radiates to her mid back. Her pain is chronic and has been present for approximately 6 weeks, getting worse the past 2 weeks, prompting her visit to the ED. She received a CT scan that was notable for the IVC filter with legs beyond the IVC lumen, with one abutting the aortic wall. She follows up with Dr. Ann for evaluation of DVT and lifelong anticoagulation.       In the ED /89 HR 89 T 96.3.   SCARLET was done and showed hemorrhoids with no signs of bleed.   Hgb 11.5, around her baseline. CAT a/p showed no acute intra abdominal pathology.   EGG showed sinus rythm with no signs of ischemia. trops were detectable (2019 09:56)      PAST MEDICAL & SURGICAL HISTORY:  OA (osteoarthritis)  Dyslipidemia  Lupus  Pulmonary embolism  DVT (deep venous thrombosis)  Coronary artery dilation  Hypertension  S/P partial thyroidectomy  History of total knee replacement, left  S/P aortic valve replacement with prosthetic valve  H/O: hysterectomy    Home Meds: Home Medications:  amLODIPine 5 mg oral tablet: 1 tab(s) orally once a day (:55)  Eliquis 2.5 mg oral tablet: 1 tab(s) orally 2 times a day (:55)  ferrous sulfate 325 mg (65 mg elemental iron) oral tablet: 1 tab(s) orally once a day (:55)  Lasix 20 mg oral tablet: 1 tab(s) orally once a day (:55)  Lipitor 10 mg oral tablet: 1 tab(s) orally once a day (:55)  metoprolol succinate 50 mg oral tablet, extended release: 1 tab(s) orally once a day (:55)  nitroglycerin 0.4 mg sublingual spray: 1 spray(s) sublingual every 5 minutes as needed (:55)  omeprazole 40 mg oral delayed release capsule: 1 cap(s) orally once a day (:55)  Plavix 75 mg oral tablet: 1 tab(s) orally every 48 hours (:55)  ranolazine 500 mg oral tablet, extended release: 1 tab(s) orally once a day (:55)  telmisartan 20 mg oral tablet: 1 tab(s) orally once a day (:55)    Allergies: Allergies    No Known Allergies    Intolerances      Soc:   Denies Tobacco/EtOH/Substance use  Advanced Directives: Presumed Full Code     ROS:    REVIEW OF SYSTEMS    [x ] A ten-point review of systems was otherwise negative except as noted.  [ ] Due to altered mental status/intubation, subjective information were not able to be obtained from the patient. History was obtained, to the extent possible, from review of the chart and collateral sources of information.      CURRENT MEDICATIONS:   --------------------------------------------------------------------------------------  Neurologic Medications  acetaminophen   Tablet .. 650 milliGRAM(s) Oral every 6 hours PRN Moderate Pain (4 - 6)  morphine  - Injectable 2 milliGRAM(s) IV Push every 5 minutes PRN Chest Pain  morphine  - Injectable 2 milliGRAM(s) IV Push every 6 hours PRN Severe Pain (7 - 10)    Respiratory Medications    Cardiovascular Medications  amLODIPine   Tablet 5 milliGRAM(s) Oral daily  metoprolol succinate ER 50 milliGRAM(s) Oral daily  nitroglycerin     SubLingual 0.4 milliGRAM(s) SubLingual every 10 minutes PRN Chest Pain  ranolazine 500 milliGRAM(s) Oral two times a day    Gastrointestinal Medications  ferrous sulfate Oral Tab/Cap - Peds 325 milliGRAM(s) Oral daily  pantoprazole    Tablet 40 milliGRAM(s) Oral before breakfast    Genitourinary Medications    Hematologic/Oncologic Medications  apixaban 2.5 milliGRAM(s) Oral two times a day  clopidogrel Tablet 75 milliGRAM(s) Oral daily    Antimicrobial/Immunologic Medications    Endocrine/Metabolic Medications  atorvastatin Oral Tab/Cap - Peds 10 milliGRAM(s) Oral daily    Topical/Other Medications    --------------------------------------------------------------------------------------    VITAL SIGNS, INS/OUTS (last 24 hours):  --------------------------------------------------------------------------------------  ICU Vital Signs Last 24 Hrs  T(C): 36.2 (2019 16:01), Max: 36.9 (2019 01:08)  T(F): 97.1 (2019 16:01), Max: 98.5 (2019 01:08)  HR: 72 (2019 16:01) (68 - 90)  BP: 117/67 (2019 16:01) (117/67 - 153/86)  BP(mean): --  ABP: --  ABP(mean): --  RR: 18 (2019 16:01) (18 - 19)  SpO2: 99% (2019 16:01) (99% - 100%)    I&O's Summary    2019 07:01  -  2019 07:00  --------------------------------------------------------  IN: 793 mL / OUT: 600 mL / NET: 193 mL      --------------------------------------------------------------------------------------  PHYSICAL EXAM  General: NAD, AAOx3, calm and cooperative  HEENT: NCAT, SHERRELL, EOMI, Trachea ML, Neck supple  Cardiac: RRR S1, S2, no Murmurs, rubs or gallops  Respiratory: CTAB, normal respiratory effort, breath sounds equal BL  Abdomen: Soft, non-distended, non-tender, +bowel sounds  Musculoskeletal: Strength 5/5 BL UE/LE, ROM intact, compartments soft  Neuro: Sensation grossly intact and equal throughout  Vascular: Pulses 2+ throughout, extremities well perfused  Skin: Warm/dry, normal color, no jaundice    LABS  --------------------------------------------------------------------------------------  Labs:  CAPILLARY BLOOD GLUCOSE                              11.4   4.03  )-----------( 357      ( 2019 07:46 )             34.9       Auto Neutrophil %: 55.4 % (19 @ 07:46)  Auto Immature Granulocyte %: 0.2 % (19 @ 07:46)        141  |  102  |  10  ----------------------------<  108<H>  3.8   |  20  |  0.8      Calcium, Total Serum: 9.3 mg/dL (19 @ 07:46)      LFTs:             6.6  | 0.6  | 29       ------------------[69      ( 2019 07:46 )  3.8  | x    | 14          Lipase:x      Amylase:x             Coags:     x      ----< x       ( 2019 07:46 )     >200.0       CARDIAC MARKERS ( 2019 11:29 )  x     / 0.02 ng/mL / x     / x     / x      CARDIAC MARKERS ( 2019 20:35 )  x     / 0.04 ng/mL / x     / x     / x      CARDIAC MARKERS ( 2019 11:55 )  x     / 0.02 ng/mL / 140 U/L / x     / 5.6 ng/mL  CARDIAC MARKERS ( 2019 23:50 )  x     / 0.02 ng/mL / x     / x     / x          Serum Pro-Brain Natriuretic Peptide: 798 pg/mL (19 @ 23:50)      Urinalysis Basic - ( 2019 12:20 )    Color: Light Yellow / Appearance: Clear / S.035 / pH: x  Gluc: x / Ketone: Negative  / Bili: Negative / Urobili: <2 mg/dL   Blood: x / Protein: Trace / Nitrite: Negative   Leuk Esterase: Negative / RBC: 10 /HPF / WBC 1 /HPF   Sq Epi: x / Non Sq Epi: 2 /HPF / Bacteria: Few          --------------------------------------------------------------------------------------  IMAGING RESULTS  < from: CT Abdomen and Pelvis w/ IV Cont (19 @ 04:03) >  OTHER: Tortuous atherosclerotic aorta. IVC filter is present with legs   extending beyond the IVC lumen, one of which abuts the adjacent aortic   wall.    < end of copied text >  < from: CT Abdomen and Pelvis w/ IV Cont (19 @ 04:03) >    IMPRESSION:     No CT evidence for acute intra-abdominal or pelvic pathology    < end of copied text >    ---------------------------------------------------------------------------------------

## 2019-11-13 NOTE — PROGRESS NOTE ADULT - SUBJECTIVE AND OBJECTIVE BOX
Patient's chart reviewed and patient examined by Maxx George MD (contact:[phone])    SUBJ:    Events in last 24 hours:    MEDICATIONS  (STANDING):  amLODIPine   Tablet 5 milliGRAM(s) Oral daily  apixaban 2.5 milliGRAM(s) Oral two times a day  atorvastatin Oral Tab/Cap - Peds 10 milliGRAM(s) Oral daily  clopidogrel Tablet 75 milliGRAM(s) Oral daily  ferrous sulfate Oral Tab/Cap - Peds 325 milliGRAM(s) Oral daily  metoprolol succinate ER 50 milliGRAM(s) Oral daily  pantoprazole    Tablet 40 milliGRAM(s) Oral before breakfast  ranolazine 500 milliGRAM(s) Oral two times a day    MEDICATIONS  (PRN):  acetaminophen   Tablet .. 650 milliGRAM(s) Oral every 6 hours PRN Moderate Pain (4 - 6)  morphine  - Injectable 2 milliGRAM(s) IV Push every 5 minutes PRN Chest Pain  morphine  - Injectable 2 milliGRAM(s) IV Push every 6 hours PRN Severe Pain (7 - 10)  nitroglycerin     SubLingual 0.4 milliGRAM(s) SubLingual every 10 minutes PRN Chest Pain          Vital Signs Last 24 Hrs  T(C): 36.2 (13 Nov 2019 16:01), Max: 36.9 (13 Nov 2019 01:08)  T(F): 97.1 (13 Nov 2019 16:01), Max: 98.5 (13 Nov 2019 01:08)  HR: 72 (13 Nov 2019 16:01) (68 - 90)  BP: 117/67 (13 Nov 2019 16:01) (117/67 - 153/86)  BP(mean): --  RR: 18 (13 Nov 2019 16:01) (18 - 19)  SpO2: 99% (13 Nov 2019 16:01) (99% - 100%)     REVIEW OF SYSTEMS:  CONSTITUTIONAL: No fever, chills.  CARDIOLOGY: Denies chest pain, shortness of breath or palpitations.   RESPIRATORY: denies cough, shortness of breath, wheezing.   NEUROLOGICAL: Generalized weakness, no focal deficits to report.  GI: no melena/hematochezia, denies nausea, Vomiting or diarrhea.    PSYCHIATRY: normal mood and affect    PHYSICAL EXAM:  · CONSTITUTIONAL:	Well-developed, well nourished M/F in no distress    ·RESPIRATORY:   breath sounds equal with good entry;  clear to auscultation bilaterally; no rales, rhonchi or wheeze  · CARDIOVASCULAR	S1 and S2 normal intensity, no rub, gallop or murmur,    ABDOMEN: soft, non-tender, NABS  · EXTREMITIES: No cyanosis, clubbing or edema  NEURO: alert and oriented x 3, no focal deficits.  · VASCULAR: 	Equal and normal pulses   	  TELEMETRY:      ECG:    LABS:                        11.4   4.03  )-----------( 357      ( 13 Nov 2019 07:46 )             34.9     11-13    141  |  102  |  10  ----------------------------<  108<H>  3.8   |  20  |  0.8    Ca    9.3      13 Nov 2019 07:46  Mg     2.0     11-11    TPro  6.6  /  Alb  3.8  /  TBili  0.6  /  DBili  x   /  AST  29  /  ALT  14  /  AlkPhos  69  11-13    CARDIAC MARKERS ( 13 Nov 2019 11:29 )  x     / 0.02 ng/mL / x     / x     / x      CARDIAC MARKERS ( 12 Nov 2019 20:35 )  x     / 0.04 ng/mL / x     / x     / x      CARDIAC MARKERS ( 12 Nov 2019 11:55 )  x     / 0.02 ng/mL / 140 U/L / x     / 5.6 ng/mL  CARDIAC MARKERS ( 11 Nov 2019 23:50 )  x     / 0.02 ng/mL / x     / x     / x          PT/INR - ( 11 Nov 2019 23:50 )   PT: 13.90 sec;   INR: 1.21 ratio         PTT - ( 13 Nov 2019 07:46 )  PTT:>200.0 sec    I&O's Summary    12 Nov 2019 07:01  -  13 Nov 2019 07:00  --------------------------------------------------------  IN: 793 mL / OUT: 600 mL / NET: 193 mL      BNP  RADIOLOGY & ADDITIONAL STUDIES:    IMPRESSION AND PLAN: Patient's chart reviewed and patient examined by Maxx George MD (contact:[phone])    SUBJ: diarrhea persists.    Events in last 24 hours: no dyspnea, palp.    MEDICATIONS  (STANDING):  amLODIPine   Tablet 5 milliGRAM(s) Oral daily  apixaban 2.5 milliGRAM(s) Oral two times a day  atorvastatin Oral Tab/Cap - Peds 10 milliGRAM(s) Oral daily  clopidogrel Tablet 75 milliGRAM(s) Oral daily  ferrous sulfate Oral Tab/Cap - Peds 325 milliGRAM(s) Oral daily  metoprolol succinate ER 50 milliGRAM(s) Oral daily  pantoprazole    Tablet 40 milliGRAM(s) Oral before breakfast  ranolazine 500 milliGRAM(s) Oral two times a day    MEDICATIONS  (PRN):  acetaminophen   Tablet .. 650 milliGRAM(s) Oral every 6 hours PRN Moderate Pain (4 - 6)  morphine  - Injectable 2 milliGRAM(s) IV Push every 5 minutes PRN Chest Pain  morphine  - Injectable 2 milliGRAM(s) IV Push every 6 hours PRN Severe Pain (7 - 10)  nitroglycerin     SubLingual 0.4 milliGRAM(s) SubLingual every 10 minutes PRN Chest Pain          Vital Signs Last 24 Hrs  T(C): 36.2 (13 Nov 2019 16:01), Max: 36.9 (13 Nov 2019 01:08)  T(F): 97.1 (13 Nov 2019 16:01), Max: 98.5 (13 Nov 2019 01:08)  HR: 72 (13 Nov 2019 16:01) (68 - 90)  BP: 117/67 (13 Nov 2019 16:01) (117/67 - 153/86)  BP(mean): --  RR: 18 (13 Nov 2019 16:01) (18 - 19)  SpO2: 99% (13 Nov 2019 16:01) (99% - 100%)     REVIEW OF SYSTEMS:  CONSTITUTIONAL: No fever, chills.  CARDIOLOGY: Denies chest pain, shortness of breath or palpitations.   RESPIRATORY: denies cough, shortness of breath, wheezing.   NEUROLOGICAL: Generalized weakness, no focal deficits to report.  GI: no melena/hematochezia, has diarrhea.    PSYCHIATRY: normal mood and affect    PHYSICAL EXAM:  · CONSTITUTIONAL:	Well-developed, well nourished F in no distress    ·RESPIRATORY:   breath sounds equal with good entry;  clear to auscultation bilaterally; no rales, rhonchi or wheeze  · CARDIOVASCULAR	S1 and S2 normal intensity, no rub, gallop 2/6 ejection systolic murmur at base.    ABDOMEN: soft, non-tender, NABS  · EXTREMITIES: No cyanosis, clubbing.  NEURO: alert and oriented x 3, no focal deficits.  · VASCULAR: 	Equal and normal pulses   	  TELEMETRY:      ECG:    LABS:                        11.4   4.03  )-----------( 357      ( 13 Nov 2019 07:46 )             34.9     11-13    141  |  102  |  10  ----------------------------<  108<H>  3.8   |  20  |  0.8    Ca    9.3      13 Nov 2019 07:46  Mg     2.0     11-11    TPro  6.6  /  Alb  3.8  /  TBili  0.6  /  DBili  x   /  AST  29  /  ALT  14  /  AlkPhos  69  11-13    CARDIAC MARKERS ( 13 Nov 2019 11:29 )  x     / 0.02 ng/mL / x     / x     / x      CARDIAC MARKERS ( 12 Nov 2019 20:35 )  x     / 0.04 ng/mL / x     / x     / x      CARDIAC MARKERS ( 12 Nov 2019 11:55 )  x     / 0.02 ng/mL / 140 U/L / x     / 5.6 ng/mL  CARDIAC MARKERS ( 11 Nov 2019 23:50 )  x     / 0.02 ng/mL / x     / x     / x          PT/INR - ( 11 Nov 2019 23:50 )   PT: 13.90 sec;   INR: 1.21 ratio         PTT - ( 13 Nov 2019 07:46 )  PTT:>200.0 sec    I&O's Summary    12 Nov 2019 07:01  -  13 Nov 2019 07:00  --------------------------------------------------------  IN: 793 mL / OUT: 600 mL / NET: 193 mL      BNP  RADIOLOGY & ADDITIONAL STUDIES:    IMPRESSION AND PLAN:

## 2019-11-13 NOTE — PROGRESS NOTE ADULT - SUBJECTIVE AND OBJECTIVE BOX
GEMMA GOMEZ  85y  Female      SUBJECTIVE:had rectal bleedong-stopped    86 yo female patient with PMH of CAD s/p PCI and 7 stents placement (last one in 4/2019), HTN, HLD, DVT and PE s/p IVC filter and receiving eliquis, AVR ( changed to eliquis from coumadin on 4/2019 by Dr Mccabe), OA presented for one episode of large volume and loose bowel movement with fresh blood that happened yesterday. She has as hx of constipation for which she take stool softeners and mentions seeing in the past small amount fresh blood on the toilet paper, but no hx of large amount of blood with stools She has a hx of black stools because of taking iron supplementation. She also mentioned lower quadrant abdominal pain that has been present for the past 2 months. She has done colonoscopy 2 years ago where 5 polys were removed, no evidence of malignancy. She denied fever, nausea or vomiting.   She also mentioned chest pain, oppressive in nature located at lower left side of the chest, has been present for months for which she is taking sublingual nitroglycerine spray as needed with relief. The pain has not increased in severity or frequency for which she is using around 3 times per week of nitro spray. She has been also experiencing lightheadedness episodes, usually when she stands from sitting position or when walking. She has seen her cardiologist last week that has advised her to stop taking ranolazine as it may be causing her symptoms.     In the ED /89 HR 89 T 96.3.   SCARLET was done and showed hemorrhoids with no signs of bleed.   Attending Admisssion Note:      REVIEW OF SYSTEMS:  negative except as per above    T(C): 36.8 (11-13-19 @ 09:00), Max: 36.9 (11-13-19 @ 01:08)  HR: 68 (11-13-19 @ 09:00) (68 - 111)  BP: 153/86 (11-13-19 @ 05:17) (125/75 - 169/102)  RR: 18 (11-13-19 @ 09:00) (18 - 24)  SpO2: 99% (11-13-19 @ 09:00) (99% - 100%)  Wt(kg): --Vital Signs Last 24 Hrs  T(C): 36.8 (13 Nov 2019 09:00), Max: 36.9 (13 Nov 2019 01:08)  T(F): 98.3 (13 Nov 2019 09:00), Max: 98.5 (13 Nov 2019 01:08)  HR: 68 (13 Nov 2019 09:00) (68 - 111)  BP: 153/86 (13 Nov 2019 05:17) (125/75 - 169/102)  BP(mean): --  RR: 18 (13 Nov 2019 09:00) (18 - 24)  SpO2: 99% (13 Nov 2019 09:00) (99% - 100%)    PHYSICAL EXAM:  lungs-clear  cor-reg,nl S! &s2  abd-soft,non tender  ext-no calf tenderness  neuro-no focal    LABS:                        11.4   4.03  )-----------( 357      ( 13 Nov 2019 07:46 )             34.9     11-13    141  |  102  |  10  ----------------------------<  108<H>  3.8   |  20  |  0.8    Ca    9.3      13 Nov 2019 07:46  Mg     2.0     11-11    TPro  6.6  /  Alb  3.8  /  TBili  0.6  /  DBili  x   /  AST  29  /  ALT  14  /  AlkPhos  69  11-13  < from: 12 Lead ECG (11.12.19 @ 01:36) >    Ventricular Rate 87 BPM    Atrial Rate 87 BPM    P-R Interval 218 ms    QRS Duration 104 ms    Q-T Interval 404 ms    QTC Calculation(Bezet) 486 ms    P Axis 37 degrees    R Axis -58 degrees    T Axis 48 degrees    Diagnosis Line Sinus rhythm with 1st degree A-V block  Possible Left atrial enlargement  Left anterior fascicular block  Left ventricular hypertrophy  Abnormal ECG    Confirmed by HUMBLE VANCE MD ([id number]) on 11/12/2019 6:59:08 AM    < end of copied text >    RADIOLOGY:    < from: CT Abdomen and Pelvis w/ IV Cont (11.12.19 @ 04:03) >    EXAM:  CT ABDOMEN AND PELVIS IC            PROCEDURE DATE:  11/12/2019            INTERPRETATION:  CLINICAL STATEMENT: Left lower quadrant pain.      TECHNIQUE: Contiguous axial CT images were obtained from the lower chest   to the pubic symphysis following administration of 100cc Optiray 320   intravenous contrast.  Oral contrast was not administered.  Reformatted   images in the coronal and sagittal planes were acquired.    COMPARISON CT: CT abdomen pelvis 11/30/2009.    FINDINGS:    LOWER CHEST: Mild bibasilar subsegmental atelectasis. Cardiomegaly.   Coronary artery and mitral annular calcifications. TAVR.    HEPATOBILIARY: Unremarkable liver. Cholelithiasis without CT evidence for   pericholecystic inflammation. No biliary ductal dilatation.    SPLEEN: Unremarkable.    PANCREAS: Unremarkable.    ADRENAL GLANDS: Unremarkable.    KIDNEYS: Symmetric pattern of renal enhancement. No hydronephrosis.   Presumably right renal cysts although limited in Hounsfield measurements   secondary to streak artifact.    ABDOMINOPELVIC NODES: Unremarkable.    PELVIC ORGANS: Hysterectomy.    PERITONEUM/MESENTERY/BOWEL: Colonic diverticulosis without evidence of   diverticulitis. No evidence of bowel obstruction, free air or ascites.   Normal appendix.    BONES/SOFT TISSUES: Multilevel degenerative changes of the spine. Trace   retrolisthesis of L2 on L3.    OTHER: Tortuous atherosclerotic aorta. IVC filter is present with legs   extending beyond the IVC lumen, one of which abuts the adjacent aortic   wall.      IMPRESSION:     No CT evidence for acute intra-abdominal or pelvic pathology              MAYO SHIELDS M.D., RESIDENT RADIOLOGIST  This document has been electronically signed.  BALBINA NEVILLE M.D., ATTENDING RADIOLOGIST  This document has been electronically signed. Nov 12 2019  5:44AM              < end of copied text >    IMPRESSION:  rectal bleeding-stopped possibly due to hemorrhoid  anemia -hgb stable  s/ tavr  cad-stents  Hx. of colonic polyps  htn  ivc filter  hyperlipidemia    PLAN:  monitor cbc  no intervention by GI as per consult  restart eliquis  if no further bleeding d/c home tomorrow  start solid diet  I SPENT 50 MINUTES IN TOTAL EXAMINING,CONSELING PATIENT AND COORDINATING CARE

## 2019-11-14 ENCOUNTER — TRANSCRIPTION ENCOUNTER (OUTPATIENT)
Age: 84
End: 2019-11-14

## 2019-11-14 ENCOUNTER — INBOUND DOCUMENT (OUTPATIENT)
Age: 84
End: 2019-11-14

## 2019-11-14 VITALS
WEIGHT: 163.14 LBS | TEMPERATURE: 98 F | HEART RATE: 88 BPM | RESPIRATION RATE: 88 BRPM | DIASTOLIC BLOOD PRESSURE: 79 MMHG | SYSTOLIC BLOOD PRESSURE: 134 MMHG

## 2019-11-14 LAB
APTT BLD: 35.1 SEC — SIGNIFICANT CHANGE UP (ref 27–39.2)
BASOPHILS # BLD AUTO: 0.03 K/UL — SIGNIFICANT CHANGE UP (ref 0–0.2)
BASOPHILS NFR BLD AUTO: 0.9 % — SIGNIFICANT CHANGE UP (ref 0–1)
EOSINOPHIL # BLD AUTO: 0.08 K/UL — SIGNIFICANT CHANGE UP (ref 0–0.7)
EOSINOPHIL NFR BLD AUTO: 2.3 % — SIGNIFICANT CHANGE UP (ref 0–8)
HCT VFR BLD CALC: 34.6 % — LOW (ref 37–47)
HGB BLD-MCNC: 11.3 G/DL — LOW (ref 12–16)
IMM GRANULOCYTES NFR BLD AUTO: 0.3 % — SIGNIFICANT CHANGE UP (ref 0.1–0.3)
INR BLD: 1.36 RATIO — HIGH (ref 0.65–1.3)
LYMPHOCYTES # BLD AUTO: 1.02 K/UL — LOW (ref 1.2–3.4)
LYMPHOCYTES # BLD AUTO: 29.9 % — SIGNIFICANT CHANGE UP (ref 20.5–51.1)
MCHC RBC-ENTMCNC: 29.6 PG — SIGNIFICANT CHANGE UP (ref 27–31)
MCHC RBC-ENTMCNC: 32.7 G/DL — SIGNIFICANT CHANGE UP (ref 32–37)
MCV RBC AUTO: 90.6 FL — SIGNIFICANT CHANGE UP (ref 81–99)
MONOCYTES # BLD AUTO: 0.37 K/UL — SIGNIFICANT CHANGE UP (ref 0.1–0.6)
MONOCYTES NFR BLD AUTO: 10.9 % — HIGH (ref 1.7–9.3)
NEUTROPHILS # BLD AUTO: 1.9 K/UL — SIGNIFICANT CHANGE UP (ref 1.4–6.5)
NEUTROPHILS NFR BLD AUTO: 55.7 % — SIGNIFICANT CHANGE UP (ref 42.2–75.2)
NRBC # BLD: 0 /100 WBCS — SIGNIFICANT CHANGE UP (ref 0–0)
PLATELET # BLD AUTO: 335 K/UL — SIGNIFICANT CHANGE UP (ref 130–400)
PROTHROM AB SERPL-ACNC: 15.6 SEC — HIGH (ref 9.95–12.87)
RBC # BLD: 3.82 M/UL — LOW (ref 4.2–5.4)
RBC # FLD: 14.4 % — SIGNIFICANT CHANGE UP (ref 11.5–14.5)
WBC # BLD: 3.41 K/UL — LOW (ref 4.8–10.8)
WBC # FLD AUTO: 3.41 K/UL — LOW (ref 4.8–10.8)

## 2019-11-14 RX ORDER — NITROGLYCERIN 6.5 MG
1 CAPSULE, EXTENDED RELEASE ORAL
Qty: 0 | Refills: 0 | DISCHARGE

## 2019-11-14 RX ORDER — TELMISARTAN 20 MG/1
1 TABLET ORAL
Qty: 0 | Refills: 0 | DISCHARGE

## 2019-11-14 RX ADMIN — APIXABAN 2.5 MILLIGRAM(S): 2.5 TABLET, FILM COATED ORAL at 05:09

## 2019-11-14 RX ADMIN — ATORVASTATIN CALCIUM 10 MILLIGRAM(S): 80 TABLET, FILM COATED ORAL at 13:44

## 2019-11-14 RX ADMIN — AMLODIPINE BESYLATE 5 MILLIGRAM(S): 2.5 TABLET ORAL at 05:08

## 2019-11-14 RX ADMIN — Medication 50 MILLIGRAM(S): at 05:09

## 2019-11-14 RX ADMIN — RANOLAZINE 500 MILLIGRAM(S): 500 TABLET, FILM COATED, EXTENDED RELEASE ORAL at 05:08

## 2019-11-14 RX ADMIN — PANTOPRAZOLE SODIUM 40 MILLIGRAM(S): 20 TABLET, DELAYED RELEASE ORAL at 08:52

## 2019-11-14 RX ADMIN — Medication 325 MILLIGRAM(S): at 11:23

## 2019-11-14 RX ADMIN — CLOPIDOGREL BISULFATE 75 MILLIGRAM(S): 75 TABLET, FILM COATED ORAL at 13:44

## 2019-11-14 NOTE — PROGRESS NOTE ADULT - ASSESSMENT
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  - No planned cardiac catheterization    2] AS S/P TAVR  - no need for repeat echo    3] Hypertension  - Continue Metoprolol and Amlodipine at prescribed dose    4] Hyperlipidemia  - Continue statin therapy    5] PE S/P IVC Filter Placement  - Off OAC in view of possible LGIB    6] LGIB  - GI input appreciated  - Patient declines further GI workup.      7] PAF  - Records at [facility] reviewed.  Patient has history of AFIB.    - Will recommend low dose Eliquis 2.5 mg tablet q 12 hrs   - Patient advised to monitor bowel movement for any signs of GI bleeding.  If recurrent GI bleed, patient will need evaluation for Watchman Implantation    Discharge planning per primary MD  Patient will follow up with primary cardiologist, Dr. Wilcox.    Sky Banks MD (covering for Dr. Kurt Wilcox and Dr. Maxx George)  [phone] Office

## 2019-11-14 NOTE — CHART NOTE - NSCHARTNOTEFT_GEN_A_CORE
<<<RESIDENT DISCHARGE NOTE>>>     GEMMA GOMEZ  MRN-[id number]    VITAL SIGNS:  T(F): 97.3 (11-14-19 @ 06:46), Max: 98.7 (11-13-19 @ 23:28)  HR: 65 (11-14-19 @ 06:46)  BP: 134/70 (11-14-19 @ 06:46)  SpO2: 98% (11-14-19 @ 06:46)  Weight (kg): 74.843 (11-14-19 @ 06:46)  BMI (kg/m2): 29.2 (11-14-19 @ 06:46)    PHYSICAL EXAMINATION:  General: nad,resting comfortably  Head & Neck: NC-At, no jvd   Pulmonary: clear  Cardiovascular: S1, s2, systolic murmur heart  Gastrointestinal/Abdomen & Pelvis: soft, nontender, bs+  Neurologic/Motor: AAOX3    TEST RESULTS:                        11.3   3.41  )-----------( 335      ( 14 Nov 2019 08:14 )             34.6       11-13    141  |  102  |  10  ----------------------------<  108<H>  3.8   |  20  |  0.8    Ca    9.3      13 Nov 2019 07:46    TPro  6.6  /  Alb  3.8  /  TBili  0.6  /  DBili  x   /  AST  29  /  ALT  14  /  AlkPhos  69  11-13      FINAL DISCHARGE INTERVIEW:  Resident(s) Present: (Name: Vickie Vincent RN Present: (Name:  ___________)    DISCHARGE MEDICATION RECONCILIATION  reviewed with Attending (Name: Dr. Nigel Morocho    DISPOSITION:   [  x] Home,    [  ] Home with Visiting Nursing Services,   [    ]  SNF/ NH,    [   ] Acute Rehab (4A),   [   ] Other (Specify:_________)

## 2019-11-14 NOTE — PROGRESS NOTE ADULT - SUBJECTIVE AND OBJECTIVE BOX
11-14-19 @ 12:41    GEMMA GOMEZ  85y  Female  Unknown to me from before. Seen sitting in bed, listening to son in her phone.   Denies any c/o. No palpitation, chest sx. Feels well. Did walk around.     Came 2' LGI bleed. Since admission No evidence of recurrence. Also in ER her rectal exam didn;t reveal evidence of recent blood.     HPI on adm 11/12 :     84 yo female patient with PMH of CAD s/p PCI and 7 stents placement (last one in 4/2019), HTN, HLD, DVT and PE s/p IVC filter and receiving eliquis, AVR ( changed to eliquis from coumadin on 4/2019 by Dr Mccabe), OA presented for one episode of large volume and loose bowel movement with fresh blood that happened yesterday. She has as hx of constipation for which she take stool softeners and mentions seeing in the past small amount fresh blood on the toilet paper, but no hx of large amount of blood with stools She has a hx of black stools because of taking iron supplementation. She also mentioned lower quadrant abdominal pain that has been present for the past 2 months. She has done colonoscopy 2 years ago where 5 polys were removed, no evidence of malignancy. She denied fever, nausea or vomiting.   She also mentioned chest pain, oppressive in nature located at lower left side of the chest, has been present for months for which she is taking sublingual nitroglycerine spray as needed with relief. The pain has not increased in severity or frequency for which she is using around 3 times per week of nitro spray. She has been also experiencing lightheadedness episodes, usually when she stands from sitting position or when walking. She has seen her cardiologist last week that has advised her to stop taking ranolazine as it may be causing her symptoms.       INTERVAL EVENTS: She contd to remain stable, w/o recurrence of bleeding.    MEDICATIONS  (STANDING):  amLODIPine   Tablet 5 milliGRAM(s) Oral daily  apixaban 2.5 milliGRAM(s) Oral two times a day  atorvastatin Oral Tab/Cap - Peds 10 milliGRAM(s) Oral daily  clopidogrel Tablet 75 milliGRAM(s) Oral daily  ferrous sulfate Oral Tab/Cap - Peds 325 milliGRAM(s) Oral daily  metoprolol succinate ER 50 milliGRAM(s) Oral daily  pantoprazole    Tablet 40 milliGRAM(s) Oral before breakfast  ranolazine 500 milliGRAM(s) Oral two times a day    MEDICATIONS  (PRN):  acetaminophen   Tablet .. 650 milliGRAM(s) Oral every 6 hours PRN Moderate Pain (4 - 6)  morphine  - Injectable 2 milliGRAM(s) IV Push every 5 minutes PRN Chest Pain  morphine  - Injectable 2 milliGRAM(s) IV Push every 6 hours PRN Severe Pain (7 - 10)  nitroglycerin     SubLingual 0.4 milliGRAM(s) SubLingual every 10 minutes PRN Chest Pain      T(C): 36.7 (11-14-19 @ 13:27), Max: 37.1 (11-13-19 @ 23:28)  HR: 88 (11-14-19 @ 13:27) (65 - 88)  BP: 134/79 (11-14-19 @ 13:27) (128/68 - 138/76)  RR: 88 (11-14-19 @ 13:27) (18 - 88)  SpO2: 98% (11-14-19 @ 06:46) (98% - 100%)  Wt(kg): --Vital Signs Last 24 Hrs  T(C): 36.7 (14 Nov 2019 13:27), Max: 37.1 (13 Nov 2019 23:28)  T(F): 98 (14 Nov 2019 13:27), Max: 98.7 (13 Nov 2019 23:28)  HR: 88 (14 Nov 2019 13:27) (65 - 88)  BP: 134/79 (14 Nov 2019 13:27) (128/68 - 138/76)  BP(mean): --  RR: 88 (14 Nov 2019 13:27) (18 - 88)  SpO2: 98% (14 Nov 2019 06:46) (98% - 100%)    PHYSICAL EXAM:  GENERAL: NAD. Amiable lady.   NECK: No JVD, no bruit.   CHEST/LUNG: Good AE, NVBS  HEART: S1 S2, ESM LSE  ABDOMEN: soft, benign, Non tenderness  EXTREMITIES: no CCE  Normal neuro exam                          11.3   3.41  )-----------( 335      ( 14 Nov 2019 08:14 )             34.6     11-13    141  |  102  |  10  ----------------------------<  108<H>  3.8   |  20  |  0.8    Ca    9.3      13 Nov 2019 07:46    TPro  6.6  /  Alb  3.8  /  TBili  0.6  /  DBili  x   /  AST  29  /  ALT  14  /  AlkPhos  69  11-13      PT/INR - ( 14 Nov 2019 08:14 )   PT: 15.60 sec;   INR: 1.36 ratio         PTT - ( 14 Nov 2019 08:14 )  PTT:35.1 sec      RADIOLOGY & ADDITIONAL TESTS:      ASSESSMENT / PLAN  :    Rectal Bleed : Transient. Stopped. No intervention 2' to her high risk. She used to be on anticoag. Needs to be observed closely.   CAD hx stent. : Cont risk fx control. Cont rx as adv by cardio  Hx TAVR : stable. No sx.   HTN : stable cont same  Known Coln polyp.   Hx DVT : been stable. Had IVC filter, been stable.     She is doing well. To be discharged.

## 2019-11-14 NOTE — DISCHARGE NOTE PROVIDER - NSDCFUADDINST_GEN_ALL_CORE_FT
please follow up with your primary care doctor within one week  please follow up with vascular within 1-2 week  please follow up with cardiology within 1-2 week

## 2019-11-14 NOTE — DISCHARGE NOTE PROVIDER - NSDCMRMEDTOKEN_GEN_ALL_CORE_FT
amLODIPine 5 mg oral tablet: 1 tab(s) orally once a day  Eliquis 2.5 mg oral tablet: 1 tab(s) orally 2 times a day  ferrous sulfate 325 mg (65 mg elemental iron) oral tablet: 1 tab(s) orally once a day  Lasix 20 mg oral tablet: 1 tab(s) orally once a day  Lipitor 10 mg oral tablet: 1 tab(s) orally once a day  metoprolol succinate 50 mg oral tablet, extended release: 1 tab(s) orally once a day  omeprazole 40 mg oral delayed release capsule: 1 cap(s) orally once a day  Plavix 75 mg oral tablet: 1 tab(s) orally every 48 hours  ranolazine 500 mg oral tablet, extended release: 1 tab(s) orally once a day

## 2019-11-14 NOTE — PROGRESS NOTE ADULT - SUBJECTIVE AND OBJECTIVE BOX
Patient was seen and examined by me in CEU.    She appears comfortable in bed.  She offers no new complaints.  She had a colonoscopy at [facility] in the past 1 year.  She denies any GI bleed now.   He denies any palpitations.  Vitals:  T(C): 36.3 (11-14-19 @ 06:46), Max: 37.1 (11-13-19 @ 23:28)  HR: 65 (11-14-19 @ 06:46) (65 - 101)  BP: 134/70 (11-14-19 @ 06:46) (117/67 - 153/86)  RR: 20 (11-14-19 @ 06:46) (18 - 20)  SpO2: 98% (11-14-19 @ 06:46) (98% - 100%)  ECG:    LABS:                        11.3   3.41  )-----------( 335      ( 14 Nov 2019 08:14 )             34.6     11-13    141  |  102  |  10  ----------------------------<  108<H>  3.8   |  20  |  0.8    Ca    9.3      13 Nov 2019 07:46    TPro  6.6  /  Alb  3.8  /  TBili  0.6  /  DBili  x   /  AST  29  /  ALT  14  /  AlkPhos  69  11-13    PT/INR - ( 14 Nov 2019 08:14 )   PT: 15.60 sec;   INR: 1.36 ratio         PTT - ( 14 Nov 2019 08:14 )  PTT:35.1 sec  MEDICATIONS  (STANDING):  amLODIPine   Tablet 5 milliGRAM(s) Oral daily  apixaban 2.5 milliGRAM(s) Oral two times a day  atorvastatin Oral Tab/Cap - Peds 10 milliGRAM(s) Oral daily  clopidogrel Tablet 75 milliGRAM(s) Oral daily  ferrous sulfate Oral Tab/Cap - Peds 325 milliGRAM(s) Oral daily  metoprolol succinate ER 50 milliGRAM(s) Oral daily  pantoprazole    Tablet 40 milliGRAM(s) Oral before breakfast  ranolazine 500 milliGRAM(s) Oral two times a day    MEDICATIONS  (PRN):  acetaminophen   Tablet .. 650 milliGRAM(s) Oral every 6 hours PRN Moderate Pain (4 - 6)  morphine  - Injectable 2 milliGRAM(s) IV Push every 5 minutes PRN Chest Pain  morphine  - Injectable 2 milliGRAM(s) IV Push every 6 hours PRN Severe Pain (7 - 10)  nitroglycerin     SubLingual 0.4 milliGRAM(s) SubLingual every 10 minutes PRN Chest Pain      PHYSICAL EXAM:    Constitutional: appears stated age, well developed/nourished, no acute distress  Eyes: EOM's intact.  PERRLA  ENMT: Normocephalic, atraumatic.  Clear oropharynx.  No ear discharge.  Neck: Jugular veins non-distended; no carotid bruits bilaterally.  Respiratory: respiratory pattern unlabored; no dullness to percussion; lungs clear to auscultation bilaterally.  Cardiovascular: Regular rhythm.  S1 and S2 normal.  No murmur nor rub appreciated.  Abdomen: Soft, non-tender.  Normal bowel sounds.  Extremities: extremities warm; no cyanosis, clubbing or edema.  Pulses: Intact bilaterally  Skin: No gross abnormalities noted.  Musculoskeletal: No gross deformities  Neurological: Alert, oriented x 3.  No focal neurologic deficits noted. Patient was seen and examined by me in CEU.    She appears comfortable in bed.  She offers no new complaints.  She had a colonoscopy at [facility] in the past 1 year.  She denies any GI bleed now.   He denies any palpitations.    Vitals:  T(C): 36.3 (11-14-19 @ 06:46), Max: 37.1 (11-13-19 @ 23:28)  HR: 65 (11-14-19 @ 06:46) (65 - 101)  BP: 134/70 (11-14-19 @ 06:46) (117/67 - 153/86)  RR: 20 (11-14-19 @ 06:46) (18 - 20)  SpO2: 98% (11-14-19 @ 06:46) (98% - 100%)    Telemetry: Sinus Rhythm    LABS:                        11.3   3.41  )-----------( 335      ( 14 Nov 2019 08:14 )             34.6     11-13    141  |  102  |  10  ----------------------------<  108<H>  3.8   |  20  |  0.8    Ca    9.3      13 Nov 2019 07:46    TPro  6.6  /  Alb  3.8  /  TBili  0.6  /  DBili  x   /  AST  29  /  ALT  14  /  AlkPhos  69  11-13    PT/INR - ( 14 Nov 2019 08:14 )   PT: 15.60 sec;   INR: 1.36 ratio         PTT - ( 14 Nov 2019 08:14 )  PTT:35.1 sec  MEDICATIONS  (STANDING):  amLODIPine   Tablet 5 milliGRAM(s) Oral daily  apixaban 2.5 milliGRAM(s) Oral two times a day  atorvastatin Oral Tab/Cap - Peds 10 milliGRAM(s) Oral daily  clopidogrel Tablet 75 milliGRAM(s) Oral daily  ferrous sulfate Oral Tab/Cap - Peds 325 milliGRAM(s) Oral daily  metoprolol succinate ER 50 milliGRAM(s) Oral daily  pantoprazole    Tablet 40 milliGRAM(s) Oral before breakfast  ranolazine 500 milliGRAM(s) Oral two times a day    MEDICATIONS  (PRN):  acetaminophen   Tablet .. 650 milliGRAM(s) Oral every 6 hours PRN Moderate Pain (4 - 6)  morphine  - Injectable 2 milliGRAM(s) IV Push every 5 minutes PRN Chest Pain  morphine  - Injectable 2 milliGRAM(s) IV Push every 6 hours PRN Severe Pain (7 - 10)  nitroglycerin     SubLingual 0.4 milliGRAM(s) SubLingual every 10 minutes PRN Chest Pain      PHYSICAL EXAM:  Not in distress  Alert, oriented x 3  No JVD; regular rhythm; nl S1S2  Bilateral Breath sounds  Abdomen soft  No edema  No focal neurologic deficits

## 2019-11-14 NOTE — DISCHARGE NOTE PROVIDER - CARE PROVIDERS DIRECT ADDRESSES
[email or web address]

## 2019-11-14 NOTE — DISCHARGE NOTE PROVIDER - PROVIDER TOKENS
PROVIDER:[TOKEN:[[id number]],FOLLOWUP:[2 weeks]],PROVIDER:[TOKEN:[[id number]],FOLLOWUP:[2 weeks]],PROVIDER:[TOKEN:[[id number]],FOLLOWUP:[1 week]]

## 2019-11-14 NOTE — DISCHARGE NOTE NURSING/CASE MANAGEMENT/SOCIAL WORK - NSDCPEELIQUIS_GEN_ALL_CORE
Apixaban/Eliquis - Potential for adverse drug reactions and interactions/Apixaban/Eliquis - Dietary Advice/Apixaban/Eliquis - Follow up monitoring/Apixaban/Eliquis - Compliance

## 2019-11-14 NOTE — DISCHARGE NOTE NURSING/CASE MANAGEMENT/SOCIAL WORK - PATIENT PORTAL LINK FT
You can access the FollowMyHealth Patient Portal offered by [facility] by registering at the following website: [email or web address]. By joining [facility]’s FollowMyHealth portal, you will also be able to view your health information using other applications (apps) compatible with our system.

## 2019-11-14 NOTE — DISCHARGE NOTE PROVIDER - NSDCCPCAREPLAN_GEN_ALL_CORE_FT
PRINCIPAL DISCHARGE DIAGNOSIS  Diagnosis: Chest pressure  Assessment and Plan of Treatment: chronic angina, resolved with Nitroglycerin      SECONDARY DISCHARGE DIAGNOSES  Diagnosis: Bloody diarrhea  Assessment and Plan of Treatment: you presented to ED with bloody dirarrhea, most likely painless hematochezia, it resolved on its own. please follow up with GI if you have fatigue, weakness or anything that is abnormal you notice in your daily activities

## 2019-11-14 NOTE — DISCHARGE NOTE PROVIDER - CARE PROVIDER_API CALL
Sher Ann)  Surgery; Vascular Surgery  [address]  [place] [address]  Phone: [phone]  Fax: [phone]  Follow Up Time: 2 weeks    Maxx George)  Cardiology; Interventional Cardiology  [address]  [place] [address]  Phone: [phone]  Fax: [phone]  Follow Up Time: 2 weeks    Sandi Dowell)  Internal Medicine  [address] [place] [address]  Phone: [phone]  Fax: [phone]  Follow Up Time: 1 week

## 2019-11-18 DIAGNOSIS — K92.1 MELENA: ICD-10-CM

## 2019-11-18 DIAGNOSIS — Z96.652 PRESENCE OF LEFT ARTIFICIAL KNEE JOINT: ICD-10-CM

## 2019-11-18 DIAGNOSIS — I48.0 PAROXYSMAL ATRIAL FIBRILLATION: ICD-10-CM

## 2019-11-18 DIAGNOSIS — M32.9 SYSTEMIC LUPUS ERYTHEMATOSUS, UNSPECIFIED: ICD-10-CM

## 2019-11-18 DIAGNOSIS — Z79.01 LONG TERM (CURRENT) USE OF ANTICOAGULANTS: ICD-10-CM

## 2019-11-18 DIAGNOSIS — Z95.828 PRESENCE OF OTHER VASCULAR IMPLANTS AND GRAFTS: ICD-10-CM

## 2019-11-18 DIAGNOSIS — G89.29 OTHER CHRONIC PAIN: ICD-10-CM

## 2019-11-18 DIAGNOSIS — T82.518S: ICD-10-CM

## 2019-11-18 DIAGNOSIS — R10.9 UNSPECIFIED ABDOMINAL PAIN: ICD-10-CM

## 2019-11-18 DIAGNOSIS — Z86.711 PERSONAL HISTORY OF PULMONARY EMBOLISM: ICD-10-CM

## 2019-11-18 DIAGNOSIS — N17.9 ACUTE KIDNEY FAILURE, UNSPECIFIED: ICD-10-CM

## 2019-11-18 DIAGNOSIS — I10 ESSENTIAL (PRIMARY) HYPERTENSION: ICD-10-CM

## 2019-11-18 DIAGNOSIS — D64.9 ANEMIA, UNSPECIFIED: ICD-10-CM

## 2019-11-18 DIAGNOSIS — I25.119 ATHEROSCLEROTIC HEART DISEASE OF NATIVE CORONARY ARTERY WITH UNSPECIFIED ANGINA PECTORIS: ICD-10-CM

## 2019-11-18 DIAGNOSIS — Z95.3 PRESENCE OF XENOGENIC HEART VALVE: ICD-10-CM

## 2019-11-18 DIAGNOSIS — Z95.5 PRESENCE OF CORONARY ANGIOPLASTY IMPLANT AND GRAFT: ICD-10-CM

## 2019-11-18 DIAGNOSIS — E78.5 HYPERLIPIDEMIA, UNSPECIFIED: ICD-10-CM

## 2019-11-18 DIAGNOSIS — M19.90 UNSPECIFIED OSTEOARTHRITIS, UNSPECIFIED SITE: ICD-10-CM

## 2019-11-18 DIAGNOSIS — K64.9 UNSPECIFIED HEMORRHOIDS: ICD-10-CM

## 2020-06-11 ENCOUNTER — APPOINTMENT (OUTPATIENT)
Dept: VASCULAR SURGERY | Facility: CLINIC | Age: 85
End: 2020-06-11

## 2020-10-15 ENCOUNTER — APPOINTMENT (OUTPATIENT)
Dept: VASCULAR SURGERY | Facility: CLINIC | Age: 85
End: 2020-10-15
Payer: MEDICARE

## 2020-10-15 VITALS — TEMPERATURE: 97.2 F

## 2020-10-15 PROCEDURE — 93970 EXTREMITY STUDY: CPT

## 2020-10-15 PROCEDURE — 99213 OFFICE O/P EST LOW 20 MIN: CPT

## 2020-10-15 NOTE — ASSESSMENT
[FreeTextEntry1] : Ms. Damon is a pleasant 86 year-old female with a history of recurrent deep vein thrombosis and presents for followup. She has a history of anemia however her GI workup was negative for acute bleeding. She is currently  on Eliquis. \par \par I performed a venous duplex my office that shows chronic changes in the common femoral, femoral veins and popliteal veins bilaterally. \par \par Ms. Damon has a known history of an IVC filter. She has history of recurrent DVT and is at risk for developing acute DVT if taken off anticoagulation. I recommend long-term anticoagulation if not contraindicated. I will see her back in one years time.

## 2020-10-15 NOTE — HISTORY OF PRESENT ILLNESS
[FreeTextEntry1] : Ms. Damon is a pleasant 86 year-old female with a history of recurrent lower extremity deep vein thrombosis in 2003, 2009, and had acute on chronic DVT in December of 2016. She also has a known history of a GI bleed, was on Coumadin, but later switched to Eliquis.

## 2020-12-16 NOTE — PROGRESS NOTE ADULT - PROVIDER SPECIALTY LIST ADULT
Cardiology
Internal Medicine
Statement Selected

## 2021-03-03 NOTE — ED ADULT NURSE NOTE - MODE OF DISCHARGE
Office Progress Note      Deja Peterson is a 89 year old female.  Chief Complaint   Patient presents with   • Follow-up     HPI:   89-year-old female with history of hypertension, mild aortic stenosis presents for follow-up visit overall reports feeling well compliant with all of her medications.  In the past had lower extremity and ankle edema which resolved after change of medications.  Denies any shortness of breath or chest pain now.  No new complaints since last visit.  Compliant no fevers or chills orthopnea reported.  Presents today with her daughter.  Energy level is good in good spirits.  Only complaint is easy bruising.    Mostly sits at home not very active.  Current Outpatient Medications   Medication Sig Dispense Refill   • gabapentin (NEURONTIN) 300 MG capsule Take 1 capsule by mouth daily.     • metoPROLOL succinate (TOPROL-XL) 25 MG 24 hr tablet Take 1 tablet by mouth daily. 90 tablet 2   • amitriptyline (ELAVIL) 10 MG tablet Take 10 mg by mouth every other day.      • amLODIPine (NORVASC) 5 MG tablet Take 5 mg by mouth daily.     • aspirin 81 MG EC tablet Take 81 mg by mouth daily.     • clopidogrel (PLAVIX) 75 MG tablet Take 75 mg by mouth daily.     • ferrous sulfate 325 (65 FE) MG tablet Take 325 mg by mouth 3 times daily.      • valsartan (DIOVAN) 320 MG tablet Take 320 mg by mouth daily.     • Cholecalciferol (VITAMIN D3) 5000 units capsule Take 5,000 Units by mouth every other day.      • gabapentin (GRALISE) 300 MG extended-release tablet twice daily       No current facility-administered medications for this visit.       Past Medical History:   Diagnosis Date   • Abnormal EKG 8/8/2019   • Aortic stenosis 8/8/2019   • Essential hypertension    • Hyperlipidemia    • Localized edema 8/8/2019   • SOB (shortness of breath) 8/8/2019      Social History:  Social History     Tobacco Use   • Smoking status: Never Smoker   • Smokeless tobacco: Never Used   • Tobacco comment: Never used tobacco.  denies smoking.   Substance Use Topics   • Alcohol use: Not Currently     Frequency: Never     Comment: denies drinking.   • Drug use: Never     Comment: denies use of street drugs.      Family History  Family History   Problem Relation Age of Onset   • Coronary Artery Disease Other         Significant for premature CAD          REVIEW OF SYSTEMS:   Juan Miguel Jara CMA  3/3/2021 11:09 AM  Signed  Chest Pain or pressure? Negative  Edema? Negative  Lightheaded or dizziness? Negative  Shortness or breath? Negative  All other 12 systems reviewed and are negative.    EXAM:     Visit Vitals  /52 (BP Location: LUE - Left upper extremity, Patient Position: Sitting, Cuff Size: Small Adult)   Pulse 60   Ht 5' 2\" (1.575 m)   Wt 54.4 kg (120 lb)   SpO2 100%   BMI 21.95 kg/m²     GENERAL: Thin elderly pleasant female in the wheelchair,in no apparent distress  SKIN: no rashes,no suspicious lesions  HEENT: atraumatic, normocephalic,ears and throat are clear  NECK: supple,no adenopathy,no bruits  LUNGS: clear to auscultation  CARDIO: regular rate and rhythm,nl S1,S2, 2 out of 6 right upper sternal border systolic murmur  GI: good BS's,no masses, HSM or tenderness  EXTREMITIES: no cyanosis, clubbing or edema  NEURO: no focal deficits  PSYCH:alert and oriented x3       No results for input(s): CHOLESTEROL, HDL, TRIGLYCERIDE, CALCLDL, LDLDIR, NONHDL in the last 8765 hours.    No results for input(s): SODIUM, CHLORIDE, BUN, GFRA, BCRAT, POTASSIUM, C02, GLUCOSE, CREATININE, GFRNA, CALCIUM, BNP, TSH in the last 8765 hours.    Invalid input(s): AGAP, FST1    No results for input(s): WBC, RBC, HGB, HCT, MCV, MCHC, RDWCV, PLT, TLYMPH in the last 8765 hours.    No results for input(s): ALB, DBIL, GPT, TP in the last 8765 hours.    Invalid input(s): TBIL, ALKP, GOT          ASSESSMENT AND PLAN:     Problem #1 edema  Continue Lasix as needed 20 mg daily with potassium supplements.     Problem #2 hypertension continue current dose  of valsartan, metoprolol and amlodipine.     Problem #3 bradycardia.  Now improved no longer present after decrease metoprolol succinate dose to 25 mg daily.     Problem #4 aortic stenosis  Preserved EF with mild severity repeat echocardiogram next year.     Problem #5 easy bruising  Discontinue aspirin already on clopidogrel for history of TIAs continue.      The patient indicates understanding of these issues and agrees to the plan.  The patient is asked to return in 6 months.        Dixon Trinidad MD  3/3/2021  11:09 AM     Wheelchair

## 2021-05-26 ENCOUNTER — INPATIENT (INPATIENT)
Facility: HOSPITAL | Age: 86
LOS: 2 days | Discharge: HOME | End: 2021-05-29
Attending: INTERNAL MEDICINE | Admitting: INTERNAL MEDICINE
Payer: MEDICARE

## 2021-05-26 VITALS
HEIGHT: 63 IN | OXYGEN SATURATION: 99 % | DIASTOLIC BLOOD PRESSURE: 98 MMHG | TEMPERATURE: 98 F | HEART RATE: 63 BPM | RESPIRATION RATE: 18 BRPM | SYSTOLIC BLOOD PRESSURE: 199 MMHG

## 2021-05-26 DIAGNOSIS — Z95.2 PRESENCE OF PROSTHETIC HEART VALVE: Chronic | ICD-10-CM

## 2021-05-26 DIAGNOSIS — Z98.890 OTHER SPECIFIED POSTPROCEDURAL STATES: Chronic | ICD-10-CM

## 2021-05-26 DIAGNOSIS — Z96.652 PRESENCE OF LEFT ARTIFICIAL KNEE JOINT: Chronic | ICD-10-CM

## 2021-05-26 LAB
ALBUMIN SERPL ELPH-MCNC: 4.3 G/DL — SIGNIFICANT CHANGE UP (ref 3.5–5.2)
ALP SERPL-CCNC: 79 U/L — SIGNIFICANT CHANGE UP (ref 30–115)
ALT FLD-CCNC: 20 U/L — SIGNIFICANT CHANGE UP (ref 0–41)
ANION GAP SERPL CALC-SCNC: 8 MMOL/L — SIGNIFICANT CHANGE UP (ref 7–14)
AST SERPL-CCNC: 27 U/L — SIGNIFICANT CHANGE UP (ref 0–41)
BASOPHILS # BLD AUTO: 0.04 K/UL — SIGNIFICANT CHANGE UP (ref 0–0.2)
BASOPHILS NFR BLD AUTO: 1 % — SIGNIFICANT CHANGE UP (ref 0–1)
BILIRUB SERPL-MCNC: 0.4 MG/DL — SIGNIFICANT CHANGE UP (ref 0.2–1.2)
BUN SERPL-MCNC: 21 MG/DL — HIGH (ref 10–20)
CALCIUM SERPL-MCNC: 9.2 MG/DL — SIGNIFICANT CHANGE UP (ref 8.5–10.1)
CHLORIDE SERPL-SCNC: 103 MMOL/L — SIGNIFICANT CHANGE UP (ref 98–110)
CO2 SERPL-SCNC: 30 MMOL/L — SIGNIFICANT CHANGE UP (ref 17–32)
CREAT SERPL-MCNC: 1.1 MG/DL — SIGNIFICANT CHANGE UP (ref 0.7–1.5)
EOSINOPHIL # BLD AUTO: 0.05 K/UL — SIGNIFICANT CHANGE UP (ref 0–0.7)
EOSINOPHIL NFR BLD AUTO: 1.3 % — SIGNIFICANT CHANGE UP (ref 0–8)
GLUCOSE SERPL-MCNC: 92 MG/DL — SIGNIFICANT CHANGE UP (ref 70–99)
HCT VFR BLD CALC: 34.4 % — LOW (ref 37–47)
HGB BLD-MCNC: 11.1 G/DL — LOW (ref 12–16)
IMM GRANULOCYTES NFR BLD AUTO: 0.3 % — SIGNIFICANT CHANGE UP (ref 0.1–0.3)
LYMPHOCYTES # BLD AUTO: 1.55 K/UL — SIGNIFICANT CHANGE UP (ref 1.2–3.4)
LYMPHOCYTES # BLD AUTO: 38.8 % — SIGNIFICANT CHANGE UP (ref 20.5–51.1)
MAGNESIUM SERPL-MCNC: 2.4 MG/DL — SIGNIFICANT CHANGE UP (ref 1.8–2.4)
MCHC RBC-ENTMCNC: 29 PG — SIGNIFICANT CHANGE UP (ref 27–31)
MCHC RBC-ENTMCNC: 32.3 G/DL — SIGNIFICANT CHANGE UP (ref 32–37)
MCV RBC AUTO: 89.8 FL — SIGNIFICANT CHANGE UP (ref 81–99)
MONOCYTES # BLD AUTO: 0.42 K/UL — SIGNIFICANT CHANGE UP (ref 0.1–0.6)
MONOCYTES NFR BLD AUTO: 10.5 % — HIGH (ref 1.7–9.3)
NEUTROPHILS # BLD AUTO: 1.92 K/UL — SIGNIFICANT CHANGE UP (ref 1.4–6.5)
NEUTROPHILS NFR BLD AUTO: 48.1 % — SIGNIFICANT CHANGE UP (ref 42.2–75.2)
NRBC # BLD: 0 /100 WBCS — SIGNIFICANT CHANGE UP (ref 0–0)
NT-PROBNP SERPL-SCNC: 649 PG/ML — HIGH (ref 0–300)
PLATELET # BLD AUTO: 342 K/UL — SIGNIFICANT CHANGE UP (ref 130–400)
POTASSIUM SERPL-MCNC: 4.7 MMOL/L — SIGNIFICANT CHANGE UP (ref 3.5–5)
POTASSIUM SERPL-SCNC: 4.7 MMOL/L — SIGNIFICANT CHANGE UP (ref 3.5–5)
PROT SERPL-MCNC: 6.9 G/DL — SIGNIFICANT CHANGE UP (ref 6–8)
RBC # BLD: 3.83 M/UL — LOW (ref 4.2–5.4)
RBC # FLD: 15.3 % — HIGH (ref 11.5–14.5)
SODIUM SERPL-SCNC: 141 MMOL/L — SIGNIFICANT CHANGE UP (ref 135–146)
TROPONIN T SERPL-MCNC: 0.01 NG/ML — SIGNIFICANT CHANGE UP
WBC # BLD: 3.99 K/UL — LOW (ref 4.8–10.8)
WBC # FLD AUTO: 3.99 K/UL — LOW (ref 4.8–10.8)

## 2021-05-26 PROCEDURE — 99285 EMERGENCY DEPT VISIT HI MDM: CPT | Mod: CS,GC

## 2021-05-26 PROCEDURE — 93010 ELECTROCARDIOGRAM REPORT: CPT

## 2021-05-26 PROCEDURE — 71045 X-RAY EXAM CHEST 1 VIEW: CPT | Mod: 26

## 2021-05-26 RX ORDER — ASPIRIN/CALCIUM CARB/MAGNESIUM 324 MG
162 TABLET ORAL ONCE
Refills: 0 | Status: COMPLETED | OUTPATIENT
Start: 2021-05-26 | End: 2021-05-26

## 2021-05-26 RX ADMIN — Medication 162 MILLIGRAM(S): at 23:10

## 2021-05-26 NOTE — ED ADULT TRIAGE NOTE - NS ED TRIAGE AVPU SCALE
denies
Alert-The patient is alert, awake and responds to voice. The patient is oriented to time, place, and person. The triage nurse is able to obtain subjective information.

## 2021-05-26 NOTE — ED PROVIDER NOTE - OBJECTIVE STATEMENT
86yF pmhx HTN, HLD, OA, PE on xarelto, CAD s/p PCI x7 c/o lightheadedness and SOB starting approx 30min prior to registration; constant, stable; pt states she was visiting son in ED when she felt sx start. No known exacerbating or alleviating factors. Pt is not a smoker. Denies chest pain, abd pain, n/v/d. 86yF pmhx HTN, HLD, OA, PE on xarelto, CAD s/p PCI x7 c/o lightheadedness and SOB starting approx 30min prior to registration; constant, stable; pt states she was visiting son in ED when she felt sx start. No known exacerbating or alleviating factors. Pt is not a smoker. Denies chest pain, abd pain, n/v/d.  Cardio: [place] in [place]

## 2021-05-26 NOTE — ED PROVIDER NOTE - PSH
H/O: hysterectomy    History of total knee replacement, left    S/P aortic valve replacement with prosthetic valve    S/P partial thyroidectomy

## 2021-05-26 NOTE — ED PROVIDER NOTE - FAMILY HISTORY
Father  Still living? No  Family history of coronary artery disease, Age at diagnosis: Age Unknown     Mother  Still living? Unknown  Family history of coronary artery disease, Age at diagnosis: Age Unknown

## 2021-05-26 NOTE — ED ADULT TRIAGE NOTE - CHIEF COMPLAINT QUOTE
pt states she is not feeling well. states she is feeling dizzy/ hyperventilating while talking. states she has hx of cardiac stents.

## 2021-05-26 NOTE — ED PROVIDER NOTE - ATTENDING CONTRIBUTION TO CARE
pt sts she was visiting her son in the ED here, when she felt lightheaded, dizzy, cp, sob.  lasted a few min.  no loc/syncope. no ha, vis or speech changes.  no n, v, d, fever or chills. no back pain.    pt in nad, perrl, eomi, neck sup, ctab ,rrr, ab soft, nt, nd.   Alert and oriented, face symmetric and speech fluent. Strength 5/5 x 4 ext and symmetric, nml gross motor movement, nml RRM/PRIYA/FTN, nml gait. No focal deficits noted.    will get ekg, labs, imaging, monitoring.

## 2021-05-26 NOTE — ED PROVIDER NOTE - NS ED ROS FT
Review of Systems:  	•	CONSTITUTIONAL - no fever, no diaphoresis, +lightheadedness  	•	SKIN - no rash, no lesions  	•	HEMATOLOGIC - no bleeding, no bruising  	•	EYES - no discharge, no injection  	•	ENT - no sore throat, no runny nose  	•	RESPIRATORY - +shortness of breath, no cough  	•	CARDIAC - no chest pain, no palpitations  	•	GI - no abd pain, no nausea, no vomiting, no diarrhea  	•	GENITO-URINARY - no dysuria, no hematuria  	•	MUSCULOSKELETAL - no joint pain, no muscle aches  	•	NEUROLOGIC - no dizziness, no headache

## 2021-05-27 LAB
SARS-COV-2 RNA SPEC QL NAA+PROBE: SIGNIFICANT CHANGE UP
TROPONIN T SERPL-MCNC: 0.01 NG/ML — SIGNIFICANT CHANGE UP

## 2021-05-27 PROCEDURE — 99222 1ST HOSP IP/OBS MODERATE 55: CPT | Mod: AI

## 2021-05-27 PROCEDURE — 70450 CT HEAD/BRAIN W/O DYE: CPT | Mod: 26,MA

## 2021-05-27 PROCEDURE — 99236 HOSP IP/OBS SAME DATE HI 85: CPT

## 2021-05-27 RX ORDER — FUROSEMIDE 40 MG
20 TABLET ORAL
Refills: 0 | Status: DISCONTINUED | OUTPATIENT
Start: 2021-05-27 | End: 2021-05-29

## 2021-05-27 RX ORDER — PANTOPRAZOLE SODIUM 20 MG/1
40 TABLET, DELAYED RELEASE ORAL
Refills: 0 | Status: DISCONTINUED | OUTPATIENT
Start: 2021-05-27 | End: 2021-05-29

## 2021-05-27 RX ORDER — NITROGLYCERIN 6.5 MG
1 CAPSULE, EXTENDED RELEASE ORAL
Qty: 0 | Refills: 0 | DISCHARGE

## 2021-05-27 RX ORDER — ASPIRIN/CALCIUM CARB/MAGNESIUM 324 MG
81 TABLET ORAL DAILY
Refills: 0 | Status: DISCONTINUED | OUTPATIENT
Start: 2021-05-27 | End: 2021-05-29

## 2021-05-27 RX ORDER — NIFEDIPINE 30 MG
60 TABLET, EXTENDED RELEASE 24 HR ORAL ONCE
Refills: 0 | Status: COMPLETED | OUTPATIENT
Start: 2021-05-27 | End: 2021-05-27

## 2021-05-27 RX ORDER — RANOLAZINE 500 MG/1
1 TABLET, FILM COATED, EXTENDED RELEASE ORAL
Qty: 0 | Refills: 0 | DISCHARGE

## 2021-05-27 RX ORDER — APIXABAN 2.5 MG/1
2.5 TABLET, FILM COATED ORAL EVERY 12 HOURS
Refills: 0 | Status: DISCONTINUED | OUTPATIENT
Start: 2021-05-27 | End: 2021-05-29

## 2021-05-27 RX ORDER — ASPIRIN/CALCIUM CARB/MAGNESIUM 324 MG
1 TABLET ORAL
Qty: 0 | Refills: 0 | DISCHARGE

## 2021-05-27 RX ORDER — FUROSEMIDE 40 MG
1 TABLET ORAL
Qty: 0 | Refills: 0 | DISCHARGE

## 2021-05-27 RX ORDER — FERROUS SULFATE 325(65) MG
325 TABLET ORAL DAILY
Refills: 0 | Status: DISCONTINUED | OUTPATIENT
Start: 2021-05-27 | End: 2021-05-29

## 2021-05-27 RX ORDER — FERROUS SULFATE 325(65) MG
1 TABLET ORAL
Qty: 0 | Refills: 0 | DISCHARGE

## 2021-05-27 RX ORDER — ATORVASTATIN CALCIUM 80 MG/1
10 TABLET, FILM COATED ORAL AT BEDTIME
Refills: 0 | Status: DISCONTINUED | OUTPATIENT
Start: 2021-05-27 | End: 2021-05-29

## 2021-05-27 RX ORDER — OMEPRAZOLE 10 MG/1
1 CAPSULE, DELAYED RELEASE ORAL
Qty: 0 | Refills: 0 | DISCHARGE

## 2021-05-27 RX ORDER — ISOSORBIDE MONONITRATE 60 MG/1
30 TABLET, EXTENDED RELEASE ORAL DAILY
Refills: 0 | Status: DISCONTINUED | OUTPATIENT
Start: 2021-05-27 | End: 2021-05-29

## 2021-05-27 RX ORDER — METOPROLOL TARTRATE 50 MG
50 TABLET ORAL EVERY 24 HOURS
Refills: 0 | Status: DISCONTINUED | OUTPATIENT
Start: 2021-05-27 | End: 2021-05-29

## 2021-05-27 RX ORDER — APIXABAN 2.5 MG/1
1 TABLET, FILM COATED ORAL
Qty: 0 | Refills: 0 | DISCHARGE

## 2021-05-27 RX ORDER — ACETAMINOPHEN 500 MG
650 TABLET ORAL EVERY 6 HOURS
Refills: 0 | Status: DISCONTINUED | OUTPATIENT
Start: 2021-05-27 | End: 2021-05-29

## 2021-05-27 RX ORDER — ISOSORBIDE MONONITRATE 60 MG/1
1 TABLET, EXTENDED RELEASE ORAL
Qty: 0 | Refills: 0 | DISCHARGE

## 2021-05-27 RX ORDER — CLOPIDOGREL BISULFATE 75 MG/1
1 TABLET, FILM COATED ORAL
Qty: 0 | Refills: 0 | DISCHARGE

## 2021-05-27 RX ORDER — AMLODIPINE BESYLATE 2.5 MG/1
1 TABLET ORAL
Qty: 0 | Refills: 0 | DISCHARGE

## 2021-05-27 RX ORDER — ATORVASTATIN CALCIUM 80 MG/1
1 TABLET, FILM COATED ORAL
Qty: 0 | Refills: 0 | DISCHARGE

## 2021-05-27 RX ORDER — METOPROLOL TARTRATE 50 MG
1 TABLET ORAL
Qty: 0 | Refills: 0 | DISCHARGE

## 2021-05-27 RX ADMIN — Medication 50 MILLIGRAM(S): at 16:26

## 2021-05-27 RX ADMIN — APIXABAN 2.5 MILLIGRAM(S): 2.5 TABLET, FILM COATED ORAL at 18:05

## 2021-05-27 RX ADMIN — ATORVASTATIN CALCIUM 10 MILLIGRAM(S): 80 TABLET, FILM COATED ORAL at 21:29

## 2021-05-27 RX ADMIN — Medication 60 MILLIGRAM(S): at 19:59

## 2021-05-27 NOTE — H&P ADULT - ATTENDING COMMENTS
Admit pt since primary care taker admitted to the hospital.   SW eval, pt might need STR placement.   Currently chest pain free.   Can be admitted to [facility].

## 2021-05-27 NOTE — ED CDU PROVIDER DISPOSITION NOTE - ATTENDING CONTRIBUTION TO CARE
86F with PMH PE *not on Xarelto* reports now on Eliquis, PCI x8, who p/w ROACH and chest pain. She is not able to make it up te 13 steps at home by herself without becoming SOB. Ambulates with a cane/walker. EKG non-ischemic, labs reviewed, trop neg x2, CXR clear. She was placed in obs for cardiology eval. Dr. Abel reported that she could f/u outpatient but unfortunately her son who care for her just got admitted and she is an unsafe d/c- she needs his help to go up and down the stairs and to feed herself. No other family that can care for her. Pt admitted for social work/case mgmt +/- placement.

## 2021-05-27 NOTE — H&P ADULT - NSHPLABSRESULTS_GEN_ALL_CORE
LABS/RADIOLOGY RESULTS:                          11.1   3.99  )-----------( 342      ( 26 May 2021 21:35 )             34.4   05-26    141  |  103  |  21<H>  ----------------------------<  92  4.7   |  30  |  1.1    Ca    9.2      26 May 2021 21:35  Mg     2.4     05-26    TPro  6.9  /  Alb  4.3  /  TBili  0.4  /  DBili  x   /  AST  27  /  ALT  20  /  AlkPhos  79  05-26  Blood Cultures    Diagnosis Line Sinus rhythm dklx6th degree A-V block with Premature atrial complexes  Incomplete right bundle branch block  Left anterior fascicular block  Minimal voltage criteria for LVH, may be normal variant ( [employer] product )  Septal infarct , age undetermined  Abnormal ECG    No evidence of intracranial hemorrhage, territorial infarct, or mass effect. No significant change since the prior exam.    No radiographic evidence of acute cardiopulmonary disease.

## 2021-05-27 NOTE — ED CDU PROVIDER INITIAL DAY NOTE - ATTENDING CONTRIBUTION TO CARE
86F with PMH PE *not on Xarelto* reports now on Eliquis, PCI x8, who p/w ROACH and chest pain. She is not able to make it up te 13 steps at home by herself without becoming SOB. Ambulates with a cane/walker. EKG non-ischemic, labs reviewed, trop neg x2, CXR clear. She was placed in obs for cardiology eval. She follows with Dr. Wilcox in [place]. She received ASA and still endorses mild CP.

## 2021-05-27 NOTE — ED ADULT NURSE REASSESSMENT NOTE - NS ED NURSE REASSESS COMMENT FT1
Pt reassessed A/O times 4 Vs stable denies chest pain, no SOB no N/V  no dizziness ,assistance provide with ADL safety precaution on progress ,pt is seen evaluate by ED attending NPO waiting for NM ,on going nursing observation .

## 2021-05-27 NOTE — ED CDU PROVIDER DISPOSITION NOTE - CARE PROVIDER_API CALL
Elsi Wilcox  CARDIOVASCULAR DISEASE  [address]  [place] [address]  Phone: [phone]  Fax: [phone]  Follow Up Time: 1-3 Days

## 2021-05-27 NOTE — H&P ADULT - HISTORY OF PRESENT ILLNESS
86 year old female with hx of HTN, HLD, OA, PE on Eliquis, CAD s/p PCI with 7 stents, TAVR, presents with shortness of breath, chest pain, and lightheadedness when she visited her son in the ED.  She was in observation and her symptoms resided.  However she lives with her son as well.  Her cardiologist, Dr. Wilcox, recommended that she see her in his office.  Currently she states she feels cold, claiming it's likely because she hasn't eaten anything.  She denies any chest pain or lightheadedness now, she states she often feels chest pain due to her stents and takes isosorbide at home.    Chest xray and CT head done in the ED were negative.    Triage vitals: /98  HR 63  RR 18  Temp 98  SpO2 99% on room air

## 2021-05-27 NOTE — ED ADULT NURSE REASSESSMENT NOTE - NS ED NURSE REASSESS COMMENT FT1
Pt reassessed A/O times 4 VS stable assistance provide safety precaution on progress did eat 65% of lunch ,pt is seen evaluate By ed attending clear to go home . pt refusing to be D/C home ,state' MY son is in the hospital we are talking care each other can not take care my self MD made aware , on going nursing observation .

## 2021-05-27 NOTE — ED CDU PROVIDER INITIAL DAY NOTE - OBJECTIVE STATEMENT
86yF pmhx HTN, HLD, OA, PE on xarelto, CAD s/p PCI x7 c/o lightheadedness and SOB starting approx 30min prior to registration; constant, stable; pt states she was visiting son in ED when she felt sx start. No known exacerbating or alleviating factors. Pt is not a smoker. Denies chest pain, abd pain, n/v/d.

## 2021-05-27 NOTE — ED CDU PROVIDER DISPOSITION NOTE - PATIENT PORTAL LINK FT
You can access the FollowMyHealth Patient Portal offered by [facility] by registering at the following website: [email or web address]. By joining [facility]’s FollowMyHealth portal, you will also be able to view your health information using other applications (apps) compatible with our system.

## 2021-05-27 NOTE — ED CDU PROVIDER INITIAL DAY NOTE - PROGRESS NOTE DETAILS
Spoke to cardiologist Dr. Wilcox states patient can follow up with him as an outpatient. Patient is feeling better and agrees with plan.

## 2021-05-27 NOTE — H&P ADULT - NSHPPHYSICALEXAM_GEN_ALL_CORE
General: WN/WD NAD  Neurology: A&Ox3, nonfocal, SANDOVAL x 4  Head:  Normocephalic, atraumatic  ENT:  Mucosa moist, no ulcerations  Neck:  Supple, no sinuses or palpable masses  Lymphatic:  No palpable cervical, supraclavicular, axillary or inguinal adenopathy  Respiratory: CTA B/L  CV: RRR, S1S2, 4/6 systolic murmur  Abdominal: Soft, NT, ND no palpable mass  MSK: +1 le edema bilaterally  Incisions: intact, no erythema or drainage

## 2021-05-27 NOTE — H&P ADULT - NSHPREVIEWOFSYSTEMS_GEN_ALL_CORE
CONSTITUTIONAL: No weakness, fevers or chills  EYES/ENT: No visual changes;  No vertigo or throat pain   NECK: No pain or stiffness  RESPIRATORY: No cough, wheezing, hemoptysis; No shortness of breath  CARDIOVASCULAR: No chest pain or palpitations currently  GASTROINTESTINAL: No abdominal or epigastric pain. No nausea, vomiting, or hematemesis; No diarrhea or constipation. No melena or hematochezia.  GENITOURINARY: No dysuria, frequency or hematuria  NEUROLOGICAL: No numbness or weakness  SKIN: No itching, rashes

## 2021-05-27 NOTE — H&P ADULT - ASSESSMENT
86 year old female with hx of HTN, HLD, OA, PE on Eliquis, CAD s/p PCI with 7 stents, TAVR, presents with shortness of breath, chest pain, and lightheadedness when she visited her son in the ED    # Stable angina  - has chest pain on and off at home for which she takes isosorbide standing and nitro SL as needed  - had PCI s/p 7 stents  - EKG here shows first degree av block, no T wave inversions, trops negative  - Dr. Wilcox recommended outpatient follow up, however caretaker is also in the hospital  - c/w aspirin / toprol / atorvastatin, consider starting ace/arb  - c/w imdur 30, currently not having any chest pain  - last echo 3 years ago showing EF 60% with severe mitral annular calcifications and thickened leaflets, will repeat echo possibly mitral regurg, has loud systolic murmur however with TAVR as well    # Hx of PE on Eliquis  - c/w Eliquis 2.5 bid    # HTN  - c/w toprol 50 qd, used to take amlodipine however discontinued at home stating prescription ran out  - did not receive toprol today, likely will remain hypertensive afterward consider ace/arb    # OA  - c/w tylenol    # Hx of lupus?  - f/u ROBYN, DsDNA, unlikely cause of chest pain    PLAN: f/u echo, f/u PT    # DVT PPX: Eliquis  # GI PPX: Protonix  # Diet: unable to input diet as still stating patient is in ED, otherwise can have DASH  FULL CODE   86 year old female with hx of HTN, HLD, OA, PE on Eliquis, CAD s/p PCI with 7 stents, TAVR, presents with shortness of breath, chest pain, and lightheadedness when she visited her son in the ED    # Stable angina  - has chest pain on and off at home for which she takes isosorbide standing and nitro SL as needed  - had PCI s/p 7 stents  - EKG here shows first degree av block, no T wave inversions, trops negative  - Dr. Wilcox recommended outpatient follow up, however caretaker is also in the hospital  - c/w aspirin / toprol / atorvastatin, consider starting ace/arb  - c/w imdur 30, currently not having any chest pain  - last echo 3 years ago showing EF 60% with severe mitral annular calcifications and thickened leaflets, will repeat echo possibly mitral regurg, has loud systolic murmur however with TAVR as well    # Hx of PE on Eliquis  - c/w Eliquis 2.5 bid    # HTN  - c/w toprol 50 qd, used to take amlodipine however discontinued at home stating prescription ran out  - did not receive toprol today, likely will remain hypertensive afterward consider ace/arb    # OA  - c/w tylenol    # Hx of lupus?  - f/u ROBYN, DsDNA, unlikely cause of chest pain    PLAN: f/u echo, f/u PT    # DVT PPX: Eliquis  # GI PPX: Protonix  # Diet: DASH  FULL CODE

## 2021-05-28 LAB
ALBUMIN SERPL ELPH-MCNC: 4.2 G/DL — SIGNIFICANT CHANGE UP (ref 3.5–5.2)
ALP SERPL-CCNC: 77 U/L — SIGNIFICANT CHANGE UP (ref 30–115)
ALT FLD-CCNC: 17 U/L — SIGNIFICANT CHANGE UP (ref 0–41)
ANION GAP SERPL CALC-SCNC: 14 MMOL/L — SIGNIFICANT CHANGE UP (ref 7–14)
AST SERPL-CCNC: 28 U/L — SIGNIFICANT CHANGE UP (ref 0–41)
BASOPHILS # BLD AUTO: 0.03 K/UL — SIGNIFICANT CHANGE UP (ref 0–0.2)
BASOPHILS NFR BLD AUTO: 0.7 % — SIGNIFICANT CHANGE UP (ref 0–1)
BILIRUB SERPL-MCNC: 1 MG/DL — SIGNIFICANT CHANGE UP (ref 0.2–1.2)
BUN SERPL-MCNC: 12 MG/DL — SIGNIFICANT CHANGE UP (ref 10–20)
CALCIUM SERPL-MCNC: 9.4 MG/DL — SIGNIFICANT CHANGE UP (ref 8.5–10.1)
CHLORIDE SERPL-SCNC: 103 MMOL/L — SIGNIFICANT CHANGE UP (ref 98–110)
CO2 SERPL-SCNC: 25 MMOL/L — SIGNIFICANT CHANGE UP (ref 17–32)
COVID-19 SPIKE DOMAIN AB INTERP: POSITIVE
COVID-19 SPIKE DOMAIN ANTIBODY RESULT: 17.9 U/ML — HIGH
CREAT SERPL-MCNC: 0.8 MG/DL — SIGNIFICANT CHANGE UP (ref 0.7–1.5)
EOSINOPHIL # BLD AUTO: 0.02 K/UL — SIGNIFICANT CHANGE UP (ref 0–0.7)
EOSINOPHIL NFR BLD AUTO: 0.5 % — SIGNIFICANT CHANGE UP (ref 0–8)
GLUCOSE SERPL-MCNC: 79 MG/DL — SIGNIFICANT CHANGE UP (ref 70–99)
HCT VFR BLD CALC: 37.4 % — SIGNIFICANT CHANGE UP (ref 37–47)
HGB BLD-MCNC: 12.1 G/DL — SIGNIFICANT CHANGE UP (ref 12–16)
IMM GRANULOCYTES NFR BLD AUTO: 0.2 % — SIGNIFICANT CHANGE UP (ref 0.1–0.3)
LYMPHOCYTES # BLD AUTO: 0.93 K/UL — LOW (ref 1.2–3.4)
LYMPHOCYTES # BLD AUTO: 21.7 % — SIGNIFICANT CHANGE UP (ref 20.5–51.1)
MAGNESIUM SERPL-MCNC: 2 MG/DL — SIGNIFICANT CHANGE UP (ref 1.8–2.4)
MCHC RBC-ENTMCNC: 28.8 PG — SIGNIFICANT CHANGE UP (ref 27–31)
MCHC RBC-ENTMCNC: 32.4 G/DL — SIGNIFICANT CHANGE UP (ref 32–37)
MCV RBC AUTO: 89 FL — SIGNIFICANT CHANGE UP (ref 81–99)
MONOCYTES # BLD AUTO: 0.49 K/UL — SIGNIFICANT CHANGE UP (ref 0.1–0.6)
MONOCYTES NFR BLD AUTO: 11.4 % — HIGH (ref 1.7–9.3)
NEUTROPHILS # BLD AUTO: 2.8 K/UL — SIGNIFICANT CHANGE UP (ref 1.4–6.5)
NEUTROPHILS NFR BLD AUTO: 65.5 % — SIGNIFICANT CHANGE UP (ref 42.2–75.2)
NRBC # BLD: 0 /100 WBCS — SIGNIFICANT CHANGE UP (ref 0–0)
PLATELET # BLD AUTO: 362 K/UL — SIGNIFICANT CHANGE UP (ref 130–400)
POTASSIUM SERPL-MCNC: 4 MMOL/L — SIGNIFICANT CHANGE UP (ref 3.5–5)
POTASSIUM SERPL-SCNC: 4 MMOL/L — SIGNIFICANT CHANGE UP (ref 3.5–5)
PROT SERPL-MCNC: 6.9 G/DL — SIGNIFICANT CHANGE UP (ref 6–8)
RBC # BLD: 4.2 M/UL — SIGNIFICANT CHANGE UP (ref 4.2–5.4)
RBC # FLD: 15 % — HIGH (ref 11.5–14.5)
SARS-COV-2 IGG+IGM SERPL QL IA: 17.9 U/ML — HIGH
SARS-COV-2 IGG+IGM SERPL QL IA: POSITIVE
SODIUM SERPL-SCNC: 142 MMOL/L — SIGNIFICANT CHANGE UP (ref 135–146)
WBC # BLD: 4.28 K/UL — LOW (ref 4.8–10.8)
WBC # FLD AUTO: 4.28 K/UL — LOW (ref 4.8–10.8)

## 2021-05-28 PROCEDURE — 93306 TTE W/DOPPLER COMPLETE: CPT | Mod: 26

## 2021-05-28 PROCEDURE — 93010 ELECTROCARDIOGRAM REPORT: CPT

## 2021-05-28 PROCEDURE — 99232 SBSQ HOSP IP/OBS MODERATE 35: CPT

## 2021-05-28 RX ORDER — ONDANSETRON 8 MG/1
4 TABLET, FILM COATED ORAL EVERY 12 HOURS
Refills: 0 | Status: DISCONTINUED | OUTPATIENT
Start: 2021-05-28 | End: 2021-05-29

## 2021-05-28 RX ADMIN — Medication 50 MILLIGRAM(S): at 17:38

## 2021-05-28 RX ADMIN — Medication 81 MILLIGRAM(S): at 13:36

## 2021-05-28 RX ADMIN — ATORVASTATIN CALCIUM 10 MILLIGRAM(S): 80 TABLET, FILM COATED ORAL at 21:38

## 2021-05-28 RX ADMIN — APIXABAN 2.5 MILLIGRAM(S): 2.5 TABLET, FILM COATED ORAL at 17:38

## 2021-05-28 RX ADMIN — ISOSORBIDE MONONITRATE 30 MILLIGRAM(S): 60 TABLET, EXTENDED RELEASE ORAL at 13:36

## 2021-05-28 RX ADMIN — Medication 325 MILLIGRAM(S): at 13:36

## 2021-05-28 NOTE — CONSULT NOTE ADULT - SUBJECTIVE AND OBJECTIVE BOX
HPI:  86 year old female with hx of HTN, HLD, OA, PE on Eliquis, CAD s/p PCI with 7 stents, TAVR, presents with shortness of breath, chest pain, and lightheadedness when she visited her son in the ED.  She was in observation and her symptoms resided.  However she lives with her son as well.  Her cardiologist, Dr. Wilcox, recommended that she see her in his office.  Currently she states she feels cold, claiming it's likely because she hasn't eaten anything.  She denies any chest pain or lightheadedness now, she states she often feels chest pain due to her stents and takes isosorbide at home.    Chest xray and CT head done in the ED were negative.    Triage vitals: /98  HR 63  RR 18  Temp 98  SpO2 99% on room air       PAST MEDICAL & SURGICAL HISTORY:  Hypertension    Coronary artery dilation    DVT (deep venous thrombosis)    Pulmonary embolism    Lupus    Dyslipidemia    OA (osteoarthritis)    H/O: hysterectomy    S/P aortic valve replacement with prosthetic valve    History of total knee replacement, left    S/P partial thyroidectomy        Hospital Course:    TODAY'S SUBJECTIVE & REVIEW OF SYMPTOMS:     Constitutional WNL   Cardio WNL   Resp WNL   GI WNL  Heme WNL  Endo WNL  Skin WNL  MSK Weakness  Neuro WNL  Cognitive WNL  Psych WNL      MEDICATIONS  (STANDING):  apixaban 2.5 milliGRAM(s) Oral every 12 hours  aspirin  chewable 81 milliGRAM(s) Oral daily  atorvastatin Oral Tab/Cap - Peds 10 milliGRAM(s) Oral at bedtime  ferrous    sulfate 325 milliGRAM(s) Oral daily  furosemide    Tablet 20 milliGRAM(s) Oral <User Schedule>  isosorbide   mononitrate ER Tablet (IMDUR) 30 milliGRAM(s) Oral daily  metoprolol succinate ER 50 milliGRAM(s) Oral every 24 hours  pantoprazole    Tablet 40 milliGRAM(s) Oral before breakfast    MEDICATIONS  (PRN):  acetaminophen   Tablet .. 650 milliGRAM(s) Oral every 6 hours PRN Mild Pain (1 - 3)      FAMILY HISTORY:  Family history of coronary artery disease (Father, Mother)        Allergies    No Known Allergies    Intolerances        SOCIAL HISTORY:    [  ] Etoh  [  ] Smoking  [  ] Substance abuse     Home Environment:  [   ] Home Alone  [ x  ] Lives with Family  [   ] Home Health Aid    Dwelling:  [   ] Apartment  [  x ] Private House  [   ] Adult Home  [   ] Skilled Nursing Facility      [   ] Short Term  [   ] Long Term  [ x  ] Stairs       Elevator [   ]    FUNCTIONAL STATUS PTA: (Check all that apply)  Ambulation: [ x   ]Independent    [   ] Dependent     [   ] Non-Ambulatory  Assistive Device: [ x  ] SA Cane  [   ]  Q Cane  [   ] Walker  [   ]  Wheelchair  ADL : [  x ] Independent  [    ]  Dependent       Vital Signs Last 24 Hrs  T(C): 35.9 (28 May 2021 14:15), Max: 36.9 (28 May 2021 01:00)  T(F): 96.6 (28 May 2021 14:15), Max: 98.4 (28 May 2021 01:00)  HR: 95 (28 May 2021 14:15) (70 - 95)  BP: 115/71 (28 May 2021 14:15) (115/71 - 211/102)  BP(mean): --  RR: 17 (28 May 2021 14:15) (17 - 18)  SpO2: 98% (28 May 2021 14:15) (98% - 100%)      PHYSICAL EXAM: Awake & Alert  GENERAL: NAD  HEAD:  Normocephalic  CHEST/LUNG: Clear   HEART: S1S2+  ABDOMEN: Soft, Nontender  EXTREMITIES:  no calf tenderness    NERVOUS SYSTEM:  Cranial Nerves 2-12 intact [   ] Abnormal  [   ]  ROM: WFL all extremities [  x ]  Abnormal [   ]  Motor Strength: WFL all extremities  [  x ]  Abnormal [   ]  Sensation: intact to light touch [ x  ] Abnormal [   ]    FUNCTIONAL STATUS:  Bed Mobility: Independent [   ]  Supervision [  x ]  Needs Assistance [   ]  N/A [   ]  Transfers: Independent [   ]  Supervision [ x ]  Needs Assistance [   ]  N/A [   ]   Ambulation: Independent [   ]  Supervision [   ]  Needs Assistance [  x ]  N/A [   ]  ADL: Independent [   ] Requires Assistance [   ] N/A [   ]      LABS:                        12.1   4.28  )-----------( 362      ( 28 May 2021 05:45 )             37.4     05-28    142  |  103  |  12  ----------------------------<  79  4.0   |  25  |  0.8    Ca    9.4      28 May 2021 05:45  Mg     2.0     05-28    TPro  6.9  /  Alb  4.2  /  TBili  1.0  /  DBili  x   /  AST  28  /  ALT  17  /  AlkPhos  77  05-28          RADIOLOGY & ADDITIONAL STUDIES:

## 2021-05-28 NOTE — PHYSICAL THERAPY INITIAL EVALUATION ADULT - PERTINENT HX OF CURRENT PROBLEM, REHAB EVAL
86 year old female with hx of HTN, HLD, OA, PE on Eliquis, CAD s/p PCI with 7 stents, TAVR, presents with shortness of breath, chest pain, and lightheadedness when she visited her son in the ED

## 2021-05-28 NOTE — PHYSICAL THERAPY INITIAL EVALUATION ADULT - GAIT TRAINING, PT EVAL
Pt will ambulate using RW or least restrictive AD for 200 ft with mod I by discharge to facilitate return to PLOF. Pt will negotiate 13 stairs with supervision in order to safely d/c home.

## 2021-05-28 NOTE — PHYSICAL THERAPY INITIAL EVALUATION ADULT - BED MOBILITY LIMITATIONS, REHAB EVAL
Vital signs are stable.  Good intake and output.  Jin was discontinued at 2015.  Patient was up to bathroom without difficulty.  Breastfeeding is improving.  Lung sounds clear and equal. Using tylenol for pain management. Able to ambulate to restroom free of dizziness.  Patient pumps after breastfeeding. Questions/concerns addressed.      decreased ability to use arms for pushing/pulling

## 2021-05-28 NOTE — PHYSICAL THERAPY INITIAL EVALUATION ADULT - BED MOBILITY TRAINING, PT EVAL
Pt will participate in supine to sit and reverse using siderails with mod I by discharge to facilitate return to PLOF.

## 2021-05-28 NOTE — PHYSICAL THERAPY INITIAL EVALUATION ADULT - GENERAL OBSERVATIONS, REHAB EVAL
PT -930. chart reviewed. Pt received semi-hall at B/S, alert, confused at times, able to follow single-step instructions and agreeable to PT evaluation.  Pt denies current shoulder pain. Reports some dizziness with standing which resolved quickly. VSS on RA.

## 2021-05-28 NOTE — PHYSICAL THERAPY INITIAL EVALUATION ADULT - ADDITIONAL COMMENTS
Pt reports she uses a cane primarily for mobility 2/2 shoulder pain, however, she has a rolling walker and rollator.    Pt reports that her son is currently in the hospital for procedure.

## 2021-05-28 NOTE — PROGRESS NOTE ADULT - ASSESSMENT
86 year old female with hx of HTN, HLD, OA, PE on Eliquis, CAD s/p PCI with 7 stents, TAVR, presents with shortness of breath, chest pain, and lightheadedness when she visited her son in the ED    # CAD, Stable angina.  - chest pain resolved  - Trop negative  - no acute ischemic changes on EKG  - OP f/u with cardio dr Wilcox  - had PCI with s/p 7 stents  - con ASA, Toprol, Atorvastatin, Imdur- echo pending (can be done as OP)    # Hx of PE on Eliquis  - c/w Eliquis 2.5 bid    # HTN - BP at goal, cont current meds    # OA - c/w tylenol, pt ambulates with cane    DC planning. Primary care taker admitted to the hospital. Possible STR.

## 2021-05-28 NOTE — PHYSICAL THERAPY INITIAL EVALUATION ADULT - LIVES WITH, PROFILE
As per pt, lives with son in [place], [address], 13 stairs to bed/bath. Pt reports having a chair lift in home to second floor but that it is broken/children

## 2021-05-28 NOTE — CONSULT NOTE ADULT - ASSESSMENT
IMPRESSION: Rehab of gait dysfunction     PRECAUTIONS: [   ] Cardiac  [   ] Respiratory  [   ] Seizures [   ] Contact Isolation  [   ] Droplet Isolation  [   ] Other    Weight Bearing Status:     RECOMMENDATION:    Out of Bed to Chair     DVT/Decubiti Prophylaxis    REHAB PLAN:     [  x  ] Bedside P/T 3-5 times a week   [    ]   Bedside O/T  2-3 times a week             [    ] Speech Therapy               [    ]  No Rehab Therapy Indicated   Conditioning/ROM                                    ADL  Bed Mobility                                               Conditioning/ROM  Transfers                                                     Bed Mobility  Sitting /Standing Balance                         Transfers                                        Gait Training                                               Sitting/Standing Balance  Stair Training [   ]Applicable                    Home equipment Eval                                                                        Splinting  [   ] Only      GOALS:   ADL   [    ]   Independent                    Transfers  [ x   ] Independent                          Ambulation  [  x  ] Independent     [ x    ] With device                            [    ]  CG                                                         [    ]  CG                                                                  [    ] CG                            [    ] Min A                                                   [    ] Min A                                                              [    ] Min  A          DISCHARGE PLAN:   [    ]  Good candidate for Intensive Rehabilitation/Hospital based                                             Will tolerate 3hrs Intensive Rehab Daily                                       [ x    ]  Short Term Rehab in Skilled Nursing Facility                             vs          [   x  ]  Home with Outpatient or VN services                                         [     ]  Possible Candidate for Intensive Hospital based Rehab

## 2021-05-29 ENCOUNTER — TRANSCRIPTION ENCOUNTER (OUTPATIENT)
Age: 86
End: 2021-05-29

## 2021-05-29 VITALS
DIASTOLIC BLOOD PRESSURE: 74 MMHG | RESPIRATION RATE: 18 BRPM | TEMPERATURE: 96 F | HEART RATE: 71 BPM | SYSTOLIC BLOOD PRESSURE: 148 MMHG

## 2021-05-29 LAB
ALBUMIN SERPL ELPH-MCNC: 4.2 G/DL — SIGNIFICANT CHANGE UP (ref 3.5–5.2)
ALP SERPL-CCNC: 71 U/L — SIGNIFICANT CHANGE UP (ref 30–115)
ALT FLD-CCNC: 19 U/L — SIGNIFICANT CHANGE UP (ref 0–41)
ANION GAP SERPL CALC-SCNC: 12 MMOL/L — SIGNIFICANT CHANGE UP (ref 7–14)
AST SERPL-CCNC: 53 U/L — HIGH (ref 0–41)
BASOPHILS # BLD AUTO: 0.04 K/UL — SIGNIFICANT CHANGE UP (ref 0–0.2)
BASOPHILS NFR BLD AUTO: 0.9 % — SIGNIFICANT CHANGE UP (ref 0–1)
BILIRUB SERPL-MCNC: 0.8 MG/DL — SIGNIFICANT CHANGE UP (ref 0.2–1.2)
BUN SERPL-MCNC: 24 MG/DL — HIGH (ref 10–20)
CALCIUM SERPL-MCNC: 9.3 MG/DL — SIGNIFICANT CHANGE UP (ref 8.5–10.1)
CHLORIDE SERPL-SCNC: 99 MMOL/L — SIGNIFICANT CHANGE UP (ref 98–110)
CO2 SERPL-SCNC: 24 MMOL/L — SIGNIFICANT CHANGE UP (ref 17–32)
CREAT SERPL-MCNC: 1.6 MG/DL — HIGH (ref 0.7–1.5)
EOSINOPHIL # BLD AUTO: 0.03 K/UL — SIGNIFICANT CHANGE UP (ref 0–0.7)
EOSINOPHIL NFR BLD AUTO: 0.7 % — SIGNIFICANT CHANGE UP (ref 0–8)
GLUCOSE SERPL-MCNC: 91 MG/DL — SIGNIFICANT CHANGE UP (ref 70–99)
HCT VFR BLD CALC: 36.7 % — LOW (ref 37–47)
HGB BLD-MCNC: 12.2 G/DL — SIGNIFICANT CHANGE UP (ref 12–16)
IMM GRANULOCYTES NFR BLD AUTO: 0.2 % — SIGNIFICANT CHANGE UP (ref 0.1–0.3)
LYMPHOCYTES # BLD AUTO: 1.5 K/UL — SIGNIFICANT CHANGE UP (ref 1.2–3.4)
LYMPHOCYTES # BLD AUTO: 32.8 % — SIGNIFICANT CHANGE UP (ref 20.5–51.1)
MCHC RBC-ENTMCNC: 29.5 PG — SIGNIFICANT CHANGE UP (ref 27–31)
MCHC RBC-ENTMCNC: 33.2 G/DL — SIGNIFICANT CHANGE UP (ref 32–37)
MCV RBC AUTO: 88.6 FL — SIGNIFICANT CHANGE UP (ref 81–99)
MONOCYTES # BLD AUTO: 0.55 K/UL — SIGNIFICANT CHANGE UP (ref 0.1–0.6)
MONOCYTES NFR BLD AUTO: 12 % — HIGH (ref 1.7–9.3)
NEUTROPHILS # BLD AUTO: 2.45 K/UL — SIGNIFICANT CHANGE UP (ref 1.4–6.5)
NEUTROPHILS NFR BLD AUTO: 53.4 % — SIGNIFICANT CHANGE UP (ref 42.2–75.2)
NRBC # BLD: 0 /100 WBCS — SIGNIFICANT CHANGE UP (ref 0–0)
PLATELET # BLD AUTO: 353 K/UL — SIGNIFICANT CHANGE UP (ref 130–400)
POTASSIUM SERPL-MCNC: 4.9 MMOL/L — SIGNIFICANT CHANGE UP (ref 3.5–5)
POTASSIUM SERPL-SCNC: 4.9 MMOL/L — SIGNIFICANT CHANGE UP (ref 3.5–5)
PROT SERPL-MCNC: 7.1 G/DL — SIGNIFICANT CHANGE UP (ref 6–8)
RBC # BLD: 4.14 M/UL — LOW (ref 4.2–5.4)
RBC # FLD: 14.9 % — HIGH (ref 11.5–14.5)
SODIUM SERPL-SCNC: 135 MMOL/L — SIGNIFICANT CHANGE UP (ref 135–146)
WBC # BLD: 4.58 K/UL — LOW (ref 4.8–10.8)
WBC # FLD AUTO: 4.58 K/UL — LOW (ref 4.8–10.8)

## 2021-05-29 PROCEDURE — 99238 HOSP IP/OBS DSCHRG MGMT 30/<: CPT

## 2021-05-29 RX ADMIN — PANTOPRAZOLE SODIUM 40 MILLIGRAM(S): 20 TABLET, DELAYED RELEASE ORAL at 05:19

## 2021-05-29 RX ADMIN — ISOSORBIDE MONONITRATE 30 MILLIGRAM(S): 60 TABLET, EXTENDED RELEASE ORAL at 11:41

## 2021-05-29 RX ADMIN — APIXABAN 2.5 MILLIGRAM(S): 2.5 TABLET, FILM COATED ORAL at 05:19

## 2021-05-29 RX ADMIN — Medication 81 MILLIGRAM(S): at 11:41

## 2021-05-29 RX ADMIN — Medication 325 MILLIGRAM(S): at 11:41

## 2021-05-29 NOTE — DISCHARGE NOTE PROVIDER - HOSPITAL COURSE
86 year old female with hx of HTN, HLD, OA, PE on Eliquis, CAD s/p PCI with 7 stents, TAVR, presents with shortness of breath, chest pain, and lightheadedness when she visited her son in the ED.  She was in observation and her symptoms resided.  However she lives with her son as well.  Her cardiologist, Dr. Wilcox, recommended that she see her in his office.  Currently she states she feels cold, claiming it's likely because she hasn't eaten anything.  She denies any chest pain or lightheadedness now, she states she often feels chest pain due to her stents and takes isosorbide at home.    Chest xray and CT head done in the ED were negative.    Triage vitals: /98  HR 63  RR 18  Temp 98  SpO2 99% on room air    # Stable angina  - has chest pain on and off at home for which she takes isosorbide standing and nitro SL as needed  - had PCI s/p 7 stents  - EKG here shows first degree av block, no T wave inversions, trops negative  - Dr. Wilcox recommended outpatient follow up, however caretaker is also in the hospital  - c/w aspirin / toprol / atorvastatin, consider starting ace/arb  - c/w imdur 30, currently not having any chest pain  - Echo shows EF 60%, grade 1 diastolic dysfucn, mod to severe mitral annular calcifications, TAVR in position, mild to mod PV regurg    # Hx of PE on Eliquis  - c/w Eliquis 2.5 bid    # HTN  - c/w toprol 50 qd    # OA  - c/w tylenol 86 year old female with hx of HTN, HLD, OA, PE on Eliquis, CAD s/p PCI with 7 stents, TAVR, presents with shortness of breath, chest pain, and lightheadedness when she visited her son in the ED.  She was in observation and her symptoms resided.  However she lives with her son as well.  Her cardiologist, Dr. Wilcox, recommended that she see her in his office.  She denies any chest pain or lightheadedness now, she states she often feels chest pain due to her stents and takes isosorbide at home.    Chest xray and CT head done in the ED were negative.    Triage vitals: /98  HR 63  RR 18  Temp 98  SpO2 99% on room air    # Stable angina  - has chest pain on and off at home for which she takes isosorbide standing and nitro SL as needed  - had PCI s/p 7 stents  - EKG here shows first degree av block, no T wave inversions, trops negative  - Dr. Wilcox recommended outpatient follow up, however caretaker is also in the hospital  - c/w aspirin / toprol / atorvastatin, BP was going down to 110/70, no ACE initiated  - c/w imdur 30, currently not having any chest pain  - Echo shows EF 60%, grade 1 diastolic dysfucn, mod to severe mitral annular calcifications, TAVR in position, mild to mod PV regurg    # Hx of PE on Eliquis  - c/w Eliquis 2.5 bid    # HTN  - c/w toprol 50 qd    # OA  - c/w tylenol    Pt is clinically stable for discharge home today.

## 2021-05-29 NOTE — DISCHARGE NOTE PROVIDER - NSDCMRMEDTOKEN_GEN_ALL_CORE_FT
aspirin 81 mg oral tablet, chewable: 1 tab(s) orally once a day  Eliquis 2.5 mg oral tablet: 1 tab(s) orally 2 times a day  ferrous sulfate 325 mg (65 mg elemental iron) oral tablet: 1 tab(s) orally once a day  isosorbide mononitrate 30 mg oral tablet, extended release: 1 tab(s) orally once a day (in the morning)  Lasix 20 mg oral tablet: 1 tab(s) orally 3 times a week  Lipitor 10 mg oral tablet: 1 tab(s) orally once a day  metoprolol succinate 50 mg oral tablet, extended release: 1 tab(s) orally once a day  nitroglycerin 0.3 mg sublingual tablet: 1 tab(s) sublingual every 5 minutes, As Needed  omeprazole 40 mg oral delayed release capsule: 1 cap(s) orally once a day

## 2021-05-29 NOTE — PROGRESS NOTE ADULT - SUBJECTIVE AND OBJECTIVE BOX
GEMMA GOMEZ    Patient is a 86y old  Female who presents with a chief complaint of chest pain and lightheadedness (27 May 2021 15:09)    INTERVAL HPI/OVERNIGHT EVENTS: no events overnight, no new complaints. Pt denies chest pain.     ROS: All ROS negative today    PHYSICAL EXAM:  T(C): 35.9, Max: 36.9 (05-28-21 @ 01:00)  HR: 95 (70 - 95)  BP: 115/71 (115/71 - 211/102)  RR: 17 (17 - 18)  SpO2: 98% (98% - 100%)    GENERAL: NAD, looks comfortable  NECK: Supple, No JVD  PULMONARY/CHEST: No rales, rhonchi, or wheezing  CARDIOVASC: Regular rate and rhythm; loud systolic murmur  GI/ABDOMEN: Soft, Nontender, Nondistended; Bowel sounds present  EXTREMITIES:  LE trace edema  SKIN: No rashes or lesions  NERVOUS SYSTEM:  Alert & Oriented X3, no focal deficit     LABS:                        12.1   4.28  )-----------( 362      ( 28 May 2021 05:45 )             37.4     05-28    142  |  103  |  12  ----------------------------<  79  4.0   |  25  |  0.8    Ca    9.4      28 May 2021 05:45  Mg     2.0     05-28    TPro  6.9  /  Alb  4.2  /  TBili  1.0  /  DBili  x   /  AST  28  /  ALT  17  /  AlkPhos  77  05-28        MEDICATIONS  (STANDING):  apixaban 2.5 milliGRAM(s) Oral every 12 hours  aspirin  chewable 81 milliGRAM(s) Oral daily  atorvastatin Oral Tab/Cap - Peds 10 milliGRAM(s) Oral at bedtime  ferrous    sulfate 325 milliGRAM(s) Oral daily  furosemide    Tablet 20 milliGRAM(s) Oral <User Schedule>  isosorbide   mononitrate ER Tablet (IMDUR) 30 milliGRAM(s) Oral daily  metoprolol succinate ER 50 milliGRAM(s) Oral every 24 hours  pantoprazole    Tablet 40 milliGRAM(s) Oral before breakfast    MEDICATIONS  (PRN):  acetaminophen   Tablet .. 650 milliGRAM(s) Oral every 6 hours PRN Mild Pain (1 - 3)      
GEMMA GOMEZ 86y Female  MRN#: [id number]   CODE STATUS:________    SUBJECTIVE  Patient is a 86y old Female who presents with a chief complaint of chest pain and lightheadedness (28 May 2021 15:43)  Currently admitted to medicine with the primary diagnosis of Inability to return to living situation    Today is hospital day 2d, and this morning she is resting comfortably and reports no overnight events.     OBJECTIVE  PAST MEDICAL & SURGICAL HISTORY  Hypertension    Coronary artery dilation    DVT (deep venous thrombosis)    Pulmonary embolism    Lupus    Dyslipidemia    OA (osteoarthritis)    H/O: hysterectomy    S/P aortic valve replacement with prosthetic valve    History of total knee replacement, left    S/P partial thyroidectomy      ALLERGIES:  No Known Allergies    MEDICATIONS:  STANDING MEDICATIONS  apixaban 2.5 milliGRAM(s) Oral every 12 hours  aspirin  chewable 81 milliGRAM(s) Oral daily  atorvastatin Oral Tab/Cap - Peds 10 milliGRAM(s) Oral at bedtime  ferrous    sulfate 325 milliGRAM(s) Oral daily  furosemide    Tablet 20 milliGRAM(s) Oral <User Schedule>  isosorbide   mononitrate ER Tablet (IMDUR) 30 milliGRAM(s) Oral daily  metoprolol succinate ER 50 milliGRAM(s) Oral every 24 hours  pantoprazole    Tablet 40 milliGRAM(s) Oral before breakfast    PRN MEDICATIONS  acetaminophen   Tablet .. 650 milliGRAM(s) Oral every 6 hours PRN  ondansetron    Tablet 4 milliGRAM(s) Oral every 12 hours PRN      VITAL SIGNS: Last 24 Hours  T(C): 35.8 (29 May 2021 00:00), Max: 35.9 (28 May 2021 14:15)  T(F): 96.5 (29 May 2021 00:00), Max: 96.6 (28 May 2021 14:15)  HR: 61 (29 May 2021 00:00) (61 - 95)  BP: 128/66 (29 May 2021 00:00) (110/70 - 128/66)  BP(mean): --  RR: 17 (29 May 2021 00:00) (17 - 17)  SpO2: 100% (28 May 2021 19:35) (98% - 100%)    LABS:                        12.2   4.58  )-----------( 353      ( 29 May 2021 04:30 )             36.7     05-29    135  |  99  |  24<H>  ----------------------------<  91  4.9   |  24  |  1.6<H>    Ca    9.3      29 May 2021 04:30  Mg     2.0     05-28    TPro  7.1  /  Alb  4.2  /  TBili  0.8  /  DBili  x   /  AST  53<H>  /  ALT  19  /  AlkPhos  71  05-29                  RADIOLOGY:    PHYSICAL EXAM:    GENERAL: NAD  HEENT:  Atraumatic, Normocephalic.   PULMONARY: Clear to auscultation bilaterally; No wheeze  CARDIOVASCULAR: Regular rate and rhythm  GASTROINTESTINAL: Soft, Nontender, Nondistended; Bowel sounds present  MUSCULOSKELETAL:  No clubbing, cyanosis  NEUROLOGY: non-focal  SKIN: No rashes or lesions      ASSESSMENT & PLAN  86 year old female with hx of HTN, HLD, OA, PE on Eliquis, CAD s/p PCI with 7 stents, TAVR, presents with shortness of breath, chest pain, and lightheadedness when she visited her son in the ED    # Stable angina  - has chest pain on and off at home for which she takes isosorbide standing and nitro SL as needed  - had PCI s/p 7 stents  - EKG here shows first degree av block, no T wave inversions, trops negative  - Dr. Wilcox recommended outpatient follow up, however caretaker is also in the hospital  - c/w aspirin / toprol / atorvastatin, consider starting ace/arb  - c/w imdur 30, currently not having any chest pain  - Echo shows EF 60%, grade 1 diastolic dysfucn, mod to severe mitral annular calcifications, TAVR in position, mild to mod PV regurg    # Hx of PE on Eliquis  - c/w Eliquis 2.5 bid    # HTN  - c/w toprol 50 qd, used to take amlodipine however discontinued at home stating prescription ran out    # OA  - c/w tylenol    # DVT PPX: Eliquis  # GI PPX: Protonix  # Diet: DASH  Dispo: pending d/c as caretaker admitted to hospital, Physiatry recs STR vs home with VNS  FULL CODE

## 2021-05-29 NOTE — DISCHARGE NOTE PROVIDER - CARE PROVIDER_API CALL
Elsi Wilcox  CARDIOVASCULAR DISEASE  [address]  [place] [address]  Phone: [phone]  Fax: [phone]  Established Patient  Follow Up Time: 1 week

## 2021-05-29 NOTE — DISCHARGE NOTE NURSING/CASE MANAGEMENT/SOCIAL WORK - PATIENT PORTAL LINK FT
You can access the FollowMyHealth Patient Portal offered by [facility] by registering at the following website: [email or web address]. By joining [employer]’s FollowMyHealth portal, you will also be able to view your health information using other applications (apps) compatible with our system.

## 2021-05-29 NOTE — DISCHARGE NOTE PROVIDER - NSDCCPCAREPLAN_GEN_ALL_CORE_FT
PRINCIPAL DISCHARGE DIAGNOSIS  Diagnosis: Chest pain  Assessment and Plan of Treatment: Due to your symptoms you were admitted and your work up was negative and you will be discharged. You will need to follow up with your cardiologist outpatient and you are now stable for discharge       PRINCIPAL DISCHARGE DIAGNOSIS  Diagnosis: Chronic stable angina  Assessment and Plan of Treatment: Due to your symptoms you were admitted and your work up was negative and you will be discharged. You will need to follow up with your cardiologist outpatient and you are now stable for discharge

## 2021-05-29 NOTE — PROGRESS NOTE ADULT - ATTENDING COMMENTS
Pt feels fine, no new complaints. Denies chest pain.   Pt's son is at bedside, he was released from the hospital and able to take care of his mom at home.    Pt will f/u with primary cardiologist as OP. Continue home meds.    Pt is clinically stable for discharge home today.

## 2021-06-08 DIAGNOSIS — Z74.2 NEED FOR ASSISTANCE AT HOME AND NO OTHER HOUSEHOLD MEMBER ABLE TO RENDER CARE: ICD-10-CM

## 2021-06-08 DIAGNOSIS — I45.2 BIFASCICULAR BLOCK: ICD-10-CM

## 2021-06-08 DIAGNOSIS — R26.9 UNSPECIFIED ABNORMALITIES OF GAIT AND MOBILITY: ICD-10-CM

## 2021-06-08 DIAGNOSIS — Z96.652 PRESENCE OF LEFT ARTIFICIAL KNEE JOINT: ICD-10-CM

## 2021-06-08 DIAGNOSIS — Z86.718 PERSONAL HISTORY OF OTHER VENOUS THROMBOSIS AND EMBOLISM: ICD-10-CM

## 2021-06-08 DIAGNOSIS — I44.0 ATRIOVENTRICULAR BLOCK, FIRST DEGREE: ICD-10-CM

## 2021-06-08 DIAGNOSIS — R55 SYNCOPE AND COLLAPSE: ICD-10-CM

## 2021-06-08 DIAGNOSIS — E78.5 HYPERLIPIDEMIA, UNSPECIFIED: ICD-10-CM

## 2021-06-08 DIAGNOSIS — M19.90 UNSPECIFIED OSTEOARTHRITIS, UNSPECIFIED SITE: ICD-10-CM

## 2021-06-08 DIAGNOSIS — R07.9 CHEST PAIN, UNSPECIFIED: ICD-10-CM

## 2021-06-08 DIAGNOSIS — Z79.01 LONG TERM (CURRENT) USE OF ANTICOAGULANTS: ICD-10-CM

## 2021-06-08 DIAGNOSIS — Z95.5 PRESENCE OF CORONARY ANGIOPLASTY IMPLANT AND GRAFT: ICD-10-CM

## 2021-06-08 DIAGNOSIS — E89.0 POSTPROCEDURAL HYPOTHYROIDISM: ICD-10-CM

## 2021-06-08 DIAGNOSIS — L93.2 OTHER LOCAL LUPUS ERYTHEMATOSUS: ICD-10-CM

## 2021-06-08 DIAGNOSIS — I25.118 ATHEROSCLEROTIC HEART DISEASE OF NATIVE CORONARY ARTERY WITH OTHER FORMS OF ANGINA PECTORIS: ICD-10-CM

## 2021-06-08 DIAGNOSIS — I10 ESSENTIAL (PRIMARY) HYPERTENSION: ICD-10-CM

## 2021-06-08 DIAGNOSIS — Z86.711 PERSONAL HISTORY OF PULMONARY EMBOLISM: ICD-10-CM

## 2021-06-08 DIAGNOSIS — Z95.2 PRESENCE OF PROSTHETIC HEART VALVE: ICD-10-CM

## 2021-09-24 NOTE — PATIENT PROFILE ADULT. - AS SC BRADEN SENSORY
(3) slightly limited Tazorac Counseling:  Patient advised that medication is irritating and drying.  Patient may need to apply sparingly and wash off after an hour before eventually leaving it on overnight.  The patient verbalized understanding of the proper use and possible adverse effects of tazorac.  All of the patient's questions and concerns were addressed.

## 2021-10-14 ENCOUNTER — APPOINTMENT (OUTPATIENT)
Dept: VASCULAR SURGERY | Facility: CLINIC | Age: 86
End: 2021-10-14
Payer: MEDICARE

## 2021-10-14 VITALS
DIASTOLIC BLOOD PRESSURE: 80 MMHG | HEIGHT: 63 IN | TEMPERATURE: 97.6 F | BODY MASS INDEX: 29.59 KG/M2 | HEART RATE: 88 BPM | SYSTOLIC BLOOD PRESSURE: 130 MMHG | WEIGHT: 167 LBS

## 2021-10-14 DIAGNOSIS — M79.89 OTHER SPECIFIED SOFT TISSUE DISORDERS: ICD-10-CM

## 2021-10-14 PROCEDURE — 99214 OFFICE O/P EST MOD 30 MIN: CPT

## 2021-10-14 PROCEDURE — 93970 EXTREMITY STUDY: CPT

## 2021-10-14 NOTE — ASSESSMENT
[FreeTextEntry1] : Ms. Damon is a pleasant 87 year-old female with a history of recurrent deep vein thrombosis and presents for followup. She has a history of anemia however her GI workup was negative for acute bleeding. She is currently  on Eliquis. \par \par Venous duplex my office that shows chronic changes in the common femoral, femoral veins and popliteal veins bilaterally. \par \par Ms. Damon has a known history of an IVC filter. She has history of recurrent DVT and is at risk for developing acute DVT if taken off anticoagulation. I recommend long-term anticoagulation if not contraindicated. I will see her back in one years time.

## 2021-10-14 NOTE — HISTORY OF PRESENT ILLNESS
[FreeTextEntry1] : Ms. Damon is a pleasant 87 year-old female with a history of recurrent lower extremity deep vein thrombosis in 2003, 2009, and had acute on chronic DVT in December of 2016. She also has a known history of a GI bleed, was on Coumadin, but later switched to Eliquis.

## 2022-04-06 NOTE — PATIENT PROFILE ADULT - NSPROGENANESREACTION_GEN_A_NUR
no previous reaction Pt p/w oropharyngeal dysphagia c/b impaired lateralization of PO, presumed premature spillage into the pharynx AEB audible gulping & impaired tongue base retraction palpated. Pt. p/w adequate hyolaryngeal elevation. No overt s/s of penetration/aspiration observed during this exam.

## 2022-09-08 ENCOUNTER — OUTPATIENT (OUTPATIENT)
Dept: OUTPATIENT SERVICES | Facility: HOSPITAL | Age: 87
LOS: 1 days | Discharge: HOME | End: 2022-09-08

## 2022-09-08 DIAGNOSIS — N63.10 UNSPECIFIED LUMP IN THE RIGHT BREAST, UNSPECIFIED QUADRANT: ICD-10-CM

## 2022-09-08 DIAGNOSIS — Z95.2 PRESENCE OF PROSTHETIC HEART VALVE: Chronic | ICD-10-CM

## 2022-09-08 DIAGNOSIS — N63.20 UNSPECIFIED LUMP IN THE LEFT BREAST, UNSPECIFIED QUADRANT: ICD-10-CM

## 2022-09-08 DIAGNOSIS — Z96.652 PRESENCE OF LEFT ARTIFICIAL KNEE JOINT: Chronic | ICD-10-CM

## 2022-09-08 DIAGNOSIS — Z98.890 OTHER SPECIFIED POSTPROCEDURAL STATES: Chronic | ICD-10-CM

## 2022-09-08 PROCEDURE — 77066 DX MAMMO INCL CAD BI: CPT | Mod: 26

## 2022-09-08 PROCEDURE — G0279: CPT | Mod: 26

## 2022-09-08 PROCEDURE — 76641 ULTRASOUND BREAST COMPLETE: CPT | Mod: 26,LT

## 2022-09-16 ENCOUNTER — RESULT REVIEW (OUTPATIENT)
Age: 87
End: 2022-09-16

## 2022-09-16 ENCOUNTER — OUTPATIENT (OUTPATIENT)
Dept: OUTPATIENT SERVICES | Facility: HOSPITAL | Age: 87
LOS: 1 days | Discharge: HOME | End: 2022-09-16
Payer: MEDICARE

## 2022-09-16 DIAGNOSIS — Z95.2 PRESENCE OF PROSTHETIC HEART VALVE: Chronic | ICD-10-CM

## 2022-09-16 DIAGNOSIS — R92.8 OTHER ABNORMAL AND INCONCLUSIVE FINDINGS ON DIAGNOSTIC IMAGING OF BREAST: ICD-10-CM

## 2022-09-16 DIAGNOSIS — Z98.890 OTHER SPECIFIED POSTPROCEDURAL STATES: Chronic | ICD-10-CM

## 2022-09-16 DIAGNOSIS — Z96.652 PRESENCE OF LEFT ARTIFICIAL KNEE JOINT: Chronic | ICD-10-CM

## 2022-09-16 PROCEDURE — 88341 IMHCHEM/IMCYTCHM EA ADD ANTB: CPT | Mod: 26,59

## 2022-09-16 PROCEDURE — 19084 BX BREAST ADD LESION US IMAG: CPT | Mod: LT

## 2022-09-16 PROCEDURE — 88305 TISSUE EXAM BY PATHOLOGIST: CPT | Mod: 26

## 2022-09-16 PROCEDURE — 88360 TUMOR IMMUNOHISTOCHEM/MANUAL: CPT | Mod: 26

## 2022-09-16 PROCEDURE — 77065 DX MAMMO INCL CAD UNI: CPT | Mod: 26,LT

## 2022-09-16 PROCEDURE — 88342 IMHCHEM/IMCYTCHM 1ST ANTB: CPT | Mod: 26,59

## 2022-09-16 PROCEDURE — 19083 BX BREAST 1ST LESION US IMAG: CPT | Mod: LT

## 2022-09-19 LAB — SURGICAL PATHOLOGY STUDY: SIGNIFICANT CHANGE UP

## 2022-09-20 DIAGNOSIS — N63.21 UNSPECIFIED LUMP IN THE LEFT BREAST, UPPER OUTER QUADRANT: ICD-10-CM

## 2022-09-21 DIAGNOSIS — C50.912 MALIGNANT NEOPLASM OF UNSPECIFIED SITE OF LEFT FEMALE BREAST: ICD-10-CM

## 2022-09-26 ENCOUNTER — TRANSCRIPTION ENCOUNTER (OUTPATIENT)
Age: 87
End: 2022-09-26

## 2022-10-03 ENCOUNTER — APPOINTMENT (OUTPATIENT)
Dept: SURGERY | Facility: CLINIC | Age: 87
End: 2022-10-03

## 2022-10-13 ENCOUNTER — APPOINTMENT (OUTPATIENT)
Dept: VASCULAR SURGERY | Facility: CLINIC | Age: 87
End: 2022-10-13

## 2022-10-13 VITALS — HEIGHT: 62 IN | WEIGHT: 168 LBS | BODY MASS INDEX: 30.91 KG/M2

## 2022-10-13 VITALS — DIASTOLIC BLOOD PRESSURE: 76 MMHG | SYSTOLIC BLOOD PRESSURE: 131 MMHG

## 2022-10-13 DIAGNOSIS — Z86.718 PERSONAL HISTORY OF OTHER VENOUS THROMBOSIS AND EMBOLISM: ICD-10-CM

## 2022-10-13 PROCEDURE — 93970 EXTREMITY STUDY: CPT

## 2022-10-13 PROCEDURE — 99213 OFFICE O/P EST LOW 20 MIN: CPT

## 2022-10-13 NOTE — ASSESSMENT
[FreeTextEntry1] : Ms. Damon is a pleasant 87 year-old female with a history of recurrent deep vein thrombosis and presents for followup. She has a history of anemia however her GI workup was negative for acute bleeding. She is currently  on Eliquis. Recent diagnosis of breast cancer with upcoming appointment at [facility].\par \par Venous duplex my office that shows \par \par Ms. Damon has a known history of an IVC filter. She has history of recurrent DVT and is at risk for developing acute DVT if taken off anticoagulation. I recommend long-term anticoagulation if not contraindicated. I will see her back in one years time.

## 2022-10-13 NOTE — HISTORY OF PRESENT ILLNESS
[FreeTextEntry1] : Ms. Damon is a pleasant 88 year-old female with a history of recurrent lower extremity deep vein thrombosis in 2003, 2009, and had acute on chronic DVT in December of 2016. She also has a known history of a GI bleed, was on Coumadin, but later switched to Eliquis. [de-identified] :  She has a recent diagnosis of left breast cancer is has an upcoming appointment at [facility]. Reports no leg swelling, pain or shortness of breath.

## 2023-05-18 NOTE — ED ADULT NURSE NOTE - NS ED NOTE ABUSE RESPONSE YN
Patient had seen PCP Dr. Ceferino Velasquez on 5/8/23 . Dr. Valerie Pate referred her to [facility] or [facility] '[facility]' . I see Dr. Mela Johnson has by permission only openings on Tuesday, May 23rd. May we use one of those slots for patient? Please advise. Yes

## 2023-09-11 NOTE — ED ADULT NURSE NOTE - NS ED NURSE DC INFO COMPLEXITY
Writer called [id number] again and spoke with [employer] and , will still need Dr Suki Bonilla to speak with Dr Becky Xie re biopsy to rt clavicular mass,   noted at md visit appt,  Dr Suki Bonilla may do lt cervical lymph node as secondary concern (r/t PET results) per Dr Becky Xie. Await md discussion, also given phone number for [facility] .
Simple: Patient demonstrates quick and easy understanding

## 2023-10-12 ENCOUNTER — APPOINTMENT (OUTPATIENT)
Dept: VASCULAR SURGERY | Facility: CLINIC | Age: 88
End: 2023-10-12

## 2024-08-08 NOTE — ED ADULT NURSE NOTE - OBJECTIVE STATEMENT
Opt out
pt recently had cardiac operative procedure, incision to left groin started to bleed overnight last night. states "her nightgown was soaked with blood"

## 2024-09-30 NOTE — PATIENT PROFILE ADULT. - FUNCTIONAL LEVEL PRIOR: TRANSFERRING
Assessment/Plan:     The patient's clinical examination today significant for well coapted incision lines to the left foot with all sutures intact.  There are no signs of infection noted.  There is some mild ecchymosis commenced with procedure performed.    Postoperative x-rays were reviewed with the patient.  There was an iatrogenic break in the cortex of the base of the second metatarsal that is stable.  This was related to the patient.  It should heal uneventfully along with the first ray.    The incision lines were cleansed with Hibiclens wash.  It was then redressed with a dry sterile dressing with Xeroform as a contact layer.  She will continue with nonweightbearing for at least another week.  She was however transition out of the posterior splint and into a low tide Cam boot today.    Follow-up in 1 week.  And is currently controlled with ibuprofen and acetaminophen.     Diagnoses and all orders for this visit:    Postoperative state    Bunion          Subjective:     Patient ID: Prachi Galo is a 47 y.o. female.    The patient resents today for follow-up status post Lapiplasty of the left foot.  The patient has been doing fairly well in terms of managing her postoperative pain.  She has been taking ibuprofen and Tylenol and staggering these medications.  She does reserve Percocet at nighttime if she needs it.  She did have a vasovagal response while the dressings are being removed, and vomited.  At present she is doing well and notes no other signs and symptoms.        Review of Systems   Constitutional: Negative.    HENT: Negative.     Eyes: Negative.    Respiratory: Negative.     Cardiovascular: Negative.    Endocrine: Negative.    Musculoskeletal: Negative.    Neurological: Negative.    Hematological: Negative.    Psychiatric/Behavioral: Negative.           Objective:     Physical Exam  Constitutional:       Appearance: Normal appearance.   HENT:      Head: Normocephalic and atraumatic.      Nose: Nose  normal.   Cardiovascular:      Pulses:           Dorsalis pedis pulses are 2+ on the left side.        Posterior tibial pulses are 2+ on the left side.   Pulmonary:      Effort: Pulmonary effort is normal.   Feet:      Comments: The patient's clinical examination today significant for well coapted incision lines to the left foot with all sutures intact.  There are no signs of infection noted.  There is some mild ecchymosis commenced with procedure performed.  Skin:     General: Skin is warm.      Capillary Refill: Capillary refill takes less than 2 seconds.   Neurological:      General: No focal deficit present.      Mental Status: She is alert and oriented to person, place, and time.   Psychiatric:         Mood and Affect: Mood normal.         Behavior: Behavior normal.         Thought Content: Thought content normal.            (1) assistive equipment